# Patient Record
Sex: FEMALE | Race: WHITE | NOT HISPANIC OR LATINO | Employment: FULL TIME | URBAN - METROPOLITAN AREA
[De-identification: names, ages, dates, MRNs, and addresses within clinical notes are randomized per-mention and may not be internally consistent; named-entity substitution may affect disease eponyms.]

---

## 2023-01-20 PROBLEM — E66.9 ADULT-ONSET OBESITY: Status: ACTIVE | Noted: 2023-01-20

## 2023-01-20 PROBLEM — K04.7 DENTAL ABSCESS: Status: ACTIVE | Noted: 2023-01-20

## 2023-01-20 PROBLEM — E66.09 CLASS 1 OBESITY DUE TO EXCESS CALORIES WITH BODY MASS INDEX (BMI) OF 33.0 TO 33.9 IN ADULT: Status: ACTIVE | Noted: 2023-01-20

## 2023-01-20 PROBLEM — R05.9 COUGH: Status: ACTIVE | Noted: 2023-01-20

## 2023-01-20 PROBLEM — M75.82 TENDINITIS OF LEFT ROTATOR CUFF: Status: ACTIVE | Noted: 2023-01-20

## 2023-01-20 PROBLEM — E66.812 CLASS 2 OBESITY DUE TO EXCESS CALORIES WITH BODY MASS INDEX (BMI) OF 35.0 TO 35.9 IN ADULT: Status: ACTIVE | Noted: 2023-01-20

## 2023-01-20 PROBLEM — R80.9 DIABETES MELLITUS WITH PROTEINURIA (MULTI): Status: ACTIVE | Noted: 2023-01-20

## 2023-01-20 PROBLEM — J01.90 ACUTE SINUSITIS: Status: ACTIVE | Noted: 2023-01-20

## 2023-01-20 PROBLEM — R93.89 ABNORMAL CT OF THE CHEST: Status: ACTIVE | Noted: 2023-01-20

## 2023-01-20 PROBLEM — I63.9 CVA (CEREBRAL VASCULAR ACCIDENT) (MULTI): Status: ACTIVE | Noted: 2023-01-20

## 2023-01-20 PROBLEM — E78.5 HYPERLIPIDEMIA: Status: ACTIVE | Noted: 2023-01-20

## 2023-01-20 PROBLEM — M21.371 FOOT DROP, RIGHT: Status: ACTIVE | Noted: 2023-01-20

## 2023-01-20 PROBLEM — G62.9 PERIPHERAL NEUROPATHY: Status: ACTIVE | Noted: 2023-01-20

## 2023-01-20 PROBLEM — T40.2X5A CONSTIPATION DUE TO OPIOID THERAPY: Status: ACTIVE | Noted: 2023-01-20

## 2023-01-20 PROBLEM — E66.01 CLASS 2 SEVERE OBESITY WITH SERIOUS COMORBIDITY AND BODY MASS INDEX (BMI) OF 35.0 TO 35.9 IN ADULT (MULTI): Status: ACTIVE | Noted: 2023-01-20

## 2023-01-20 PROBLEM — L97.929 LEG ULCER, LEFT (MULTI): Status: ACTIVE | Noted: 2023-01-20

## 2023-01-20 PROBLEM — I50.9 CHF (CONGESTIVE HEART FAILURE) (MULTI): Status: ACTIVE | Noted: 2023-01-20

## 2023-01-20 PROBLEM — R00.2 HEART PALPITATIONS: Status: ACTIVE | Noted: 2023-01-20

## 2023-01-20 PROBLEM — R21 RASH, SKIN: Status: ACTIVE | Noted: 2023-01-20

## 2023-01-20 PROBLEM — E66.812 CLASS 2 SEVERE OBESITY WITH SERIOUS COMORBIDITY AND BODY MASS INDEX (BMI) OF 35.0 TO 35.9 IN ADULT: Status: ACTIVE | Noted: 2023-01-20

## 2023-01-20 PROBLEM — I70.229 CRITICAL ISCHEMIA OF LOWER EXTREMITY (MULTI): Status: ACTIVE | Noted: 2023-01-20

## 2023-01-20 PROBLEM — I25.10 CVD (CARDIOVASCULAR DISEASE): Status: ACTIVE | Noted: 2023-01-20

## 2023-01-20 PROBLEM — E11.29 DIABETES MELLITUS WITH PROTEINURIA (MULTI): Status: ACTIVE | Noted: 2023-01-20

## 2023-01-20 PROBLEM — S39.012A LUMBOSACRAL STRAIN: Status: ACTIVE | Noted: 2023-01-20

## 2023-01-20 PROBLEM — E11.65 TYPE 2 DIABETES MELLITUS WITH HYPERGLYCEMIA (MULTI): Status: ACTIVE | Noted: 2023-01-20

## 2023-01-20 PROBLEM — R52 GENERALIZED BODY ACHES: Status: ACTIVE | Noted: 2023-01-20

## 2023-01-20 PROBLEM — R23.3 PETECHIAL RASH: Status: ACTIVE | Noted: 2023-01-20

## 2023-01-20 PROBLEM — D17.21 LIPOMA OF RIGHT UPPER EXTREMITY: Status: ACTIVE | Noted: 2023-01-20

## 2023-01-20 PROBLEM — L97.509 DIABETIC FOOT ULCER (MULTI): Status: ACTIVE | Noted: 2023-01-20

## 2023-01-20 PROBLEM — U07.1 COVID-19 VIRUS INFECTION: Status: ACTIVE | Noted: 2023-01-20

## 2023-01-20 PROBLEM — J30.9 ALLERGIC RHINITIS: Status: ACTIVE | Noted: 2023-01-20

## 2023-01-20 PROBLEM — E11.621 DIABETIC FOOT ULCER (MULTI): Status: ACTIVE | Noted: 2023-01-20

## 2023-01-20 PROBLEM — R06.00 DYSPNEA: Status: ACTIVE | Noted: 2023-01-20

## 2023-01-20 PROBLEM — R19.7 DIARRHEA: Status: ACTIVE | Noted: 2023-01-20

## 2023-01-20 PROBLEM — J02.9 ACUTE PHARYNGITIS: Status: ACTIVE | Noted: 2023-01-20

## 2023-01-20 PROBLEM — R93.1 ABNORMAL HEART SCORE CT: Status: ACTIVE | Noted: 2023-01-20

## 2023-01-20 PROBLEM — B02.9 SHINGLES: Status: ACTIVE | Noted: 2023-01-20

## 2023-01-20 PROBLEM — R93.1 ABNORMAL ECHOCARDIOGRAM: Status: ACTIVE | Noted: 2023-01-20

## 2023-01-20 PROBLEM — I48.0 PAROXYSMAL ATRIAL FIBRILLATION (MULTI): Status: ACTIVE | Noted: 2023-01-20

## 2023-01-20 PROBLEM — M25.512 ACUTE PAIN OF LEFT SHOULDER: Status: ACTIVE | Noted: 2023-01-20

## 2023-01-20 PROBLEM — K59.03 CONSTIPATION DUE TO OPIOID THERAPY: Status: ACTIVE | Noted: 2023-01-20

## 2023-01-20 PROBLEM — E66.811 CLASS 1 OBESITY DUE TO EXCESS CALORIES WITH BODY MASS INDEX (BMI) OF 33.0 TO 33.9 IN ADULT: Status: ACTIVE | Noted: 2023-01-20

## 2023-01-20 PROBLEM — H81.10 VERTIGO, BENIGN POSITIONAL: Status: ACTIVE | Noted: 2023-01-20

## 2023-01-20 PROBLEM — I63.81: Status: ACTIVE | Noted: 2023-01-20

## 2023-01-20 PROBLEM — M79.2 NEURALGIA: Status: ACTIVE | Noted: 2023-01-20

## 2023-01-20 PROBLEM — I51.9 LV DYSFUNCTION: Status: ACTIVE | Noted: 2023-01-20

## 2023-01-20 PROBLEM — G89.29 CHRONIC PAIN: Status: ACTIVE | Noted: 2023-01-20

## 2023-01-20 PROBLEM — R30.0 DYSURIA: Status: ACTIVE | Noted: 2023-01-20

## 2023-01-20 PROBLEM — I48.91 ATRIAL FIBRILLATION (MULTI): Status: ACTIVE | Noted: 2023-01-20

## 2023-01-20 PROBLEM — M25.512 LEFT SHOULDER PAIN: Status: ACTIVE | Noted: 2023-01-20

## 2023-01-20 PROBLEM — B02.29 HZV (HERPES ZOSTER VIRUS) POST HERPETIC NEURALGIA: Status: ACTIVE | Noted: 2023-01-20

## 2023-01-20 PROBLEM — Z20.822 SUSPECTED COVID-19 VIRUS INFECTION: Status: ACTIVE | Noted: 2023-01-20

## 2023-01-20 PROBLEM — E78.2 HYPERLIPEMIA, MIXED: Status: ACTIVE | Noted: 2023-01-20

## 2023-01-20 PROBLEM — H81.10 BENIGN POSITIONAL VERTIGO: Status: ACTIVE | Noted: 2023-01-20

## 2023-01-20 PROBLEM — Z98.890 HISTORY OF SURGICAL PROCEDURE: Status: ACTIVE | Noted: 2023-01-20

## 2023-01-20 PROBLEM — Z98.61 S/P PTCA (PERCUTANEOUS TRANSLUMINAL CORONARY ANGIOPLASTY): Status: ACTIVE | Noted: 2023-01-20

## 2023-01-20 PROBLEM — E66.09 CLASS 2 OBESITY DUE TO EXCESS CALORIES WITH BODY MASS INDEX (BMI) OF 35.0 TO 35.9 IN ADULT: Status: ACTIVE | Noted: 2023-01-20

## 2023-01-20 PROBLEM — I25.10 CAD IN NATIVE ARTERY: Status: ACTIVE | Noted: 2023-01-20

## 2023-01-20 PROBLEM — M62.81 MUSCLE WEAKNESS OF LOWER EXTREMITY: Status: ACTIVE | Noted: 2023-01-20

## 2023-01-20 RX ORDER — TIRZEPATIDE 7.5 MG/.5ML
7.5 INJECTION, SOLUTION SUBCUTANEOUS
COMMUNITY
End: 2023-03-07 | Stop reason: SDUPTHER

## 2023-01-20 RX ORDER — GABAPENTIN 300 MG/1
1 CAPSULE ORAL EVERY 4 HOURS
COMMUNITY
Start: 2016-04-14 | End: 2023-08-08 | Stop reason: SDUPTHER

## 2023-01-20 RX ORDER — CARVEDILOL 3.12 MG/1
1 TABLET ORAL 2 TIMES DAILY
COMMUNITY
Start: 2022-06-07 | End: 2023-05-10 | Stop reason: WASHOUT

## 2023-01-20 RX ORDER — HYDROCODONE BITARTRATE AND ACETAMINOPHEN 5; 325 MG/1; MG/1
1 TABLET ORAL EVERY 8 HOURS PRN
COMMUNITY
Start: 2022-10-18 | End: 2023-03-07 | Stop reason: SDUPTHER

## 2023-01-20 RX ORDER — HYDROCODONE BITARTRATE AND ACETAMINOPHEN 5; 325 MG/1; MG/1
1 TABLET ORAL EVERY 8 HOURS
COMMUNITY
End: 2023-05-10 | Stop reason: SDUPTHER

## 2023-01-20 RX ORDER — CLOPIDOGREL BISULFATE 75 MG/1
1 TABLET ORAL DAILY
COMMUNITY
Start: 2018-08-03 | End: 2023-03-07 | Stop reason: WASHOUT

## 2023-01-20 RX ORDER — FLASH GLUCOSE SENSOR
1 KIT MISCELLANEOUS
COMMUNITY
End: 2024-02-02 | Stop reason: SDUPTHER

## 2023-01-20 RX ORDER — TIRZEPATIDE 10 MG/.5ML
10 INJECTION, SOLUTION SUBCUTANEOUS
COMMUNITY
End: 2023-08-08 | Stop reason: SDUPTHER

## 2023-01-20 RX ORDER — FLASH GLUCOSE SCANNING READER
EACH MISCELLANEOUS 4 TIMES DAILY
COMMUNITY

## 2023-01-20 RX ORDER — SPIRONOLACTONE 25 MG/1
1 TABLET ORAL 2 TIMES DAILY
COMMUNITY
Start: 2022-05-24 | End: 2023-08-08 | Stop reason: ALTCHOICE

## 2023-01-20 RX ORDER — LANCETS 33 GAUGE
EACH MISCELLANEOUS DAILY
COMMUNITY
End: 2023-03-07 | Stop reason: WASHOUT

## 2023-01-20 RX ORDER — PSEUDOEPHEDRINE HCL 120 MG/1
1 TABLET, FILM COATED, EXTENDED RELEASE ORAL EVERY 12 HOURS PRN
COMMUNITY
Start: 2020-09-14 | End: 2023-04-20 | Stop reason: SDUPTHER

## 2023-01-20 RX ORDER — FUROSEMIDE 40 MG/1
1 TABLET ORAL DAILY
COMMUNITY
Start: 2022-06-07 | End: 2023-08-08 | Stop reason: ALTCHOICE

## 2023-01-20 RX ORDER — INSULIN LISPRO 100 [IU]/ML
24 INJECTION, SOLUTION INTRAVENOUS; SUBCUTANEOUS
COMMUNITY
Start: 2013-12-05 | End: 2023-05-10 | Stop reason: ALTCHOICE

## 2023-02-24 RX ORDER — NALOXONE HYDROCHLORIDE 4 MG/.1ML
SPRAY NASAL DAILY PRN
COMMUNITY

## 2023-02-24 RX ORDER — INSULIN LISPRO 100 [IU]/ML
INJECTION, SOLUTION INTRAVENOUS; SUBCUTANEOUS
COMMUNITY
End: 2023-03-07 | Stop reason: SDUPTHER

## 2023-02-24 RX ORDER — IBUPROFEN 200 MG
CAPSULE ORAL
COMMUNITY
Start: 2019-03-13 | End: 2023-03-07 | Stop reason: WASHOUT

## 2023-02-24 RX ORDER — INSULIN DEGLUDEC 100 U/ML
INJECTION, SOLUTION SUBCUTANEOUS
COMMUNITY
Start: 2022-09-13 | End: 2023-03-07 | Stop reason: WASHOUT

## 2023-03-07 ENCOUNTER — OFFICE VISIT (OUTPATIENT)
Dept: PRIMARY CARE | Facility: CLINIC | Age: 73
End: 2023-03-07
Payer: MEDICARE

## 2023-03-07 VITALS
BODY MASS INDEX: 29.1 KG/M2 | RESPIRATION RATE: 16 BRPM | SYSTOLIC BLOOD PRESSURE: 110 MMHG | HEART RATE: 59 BPM | WEIGHT: 192 LBS | TEMPERATURE: 97.4 F | HEIGHT: 68 IN | OXYGEN SATURATION: 97 % | DIASTOLIC BLOOD PRESSURE: 70 MMHG

## 2023-03-07 DIAGNOSIS — I50.42 CHRONIC COMBINED SYSTOLIC AND DIASTOLIC CONGESTIVE HEART FAILURE (MULTI): ICD-10-CM

## 2023-03-07 DIAGNOSIS — E11.65 TYPE 2 DIABETES MELLITUS WITH HYPERGLYCEMIA, WITH LONG-TERM CURRENT USE OF INSULIN (MULTI): Primary | ICD-10-CM

## 2023-03-07 DIAGNOSIS — I48.19 OTHER PERSISTENT ATRIAL FIBRILLATION (MULTI): ICD-10-CM

## 2023-03-07 DIAGNOSIS — E78.2 MIXED HYPERLIPIDEMIA: ICD-10-CM

## 2023-03-07 DIAGNOSIS — Z79.4 TYPE 2 DIABETES MELLITUS WITH HYPERGLYCEMIA, WITH LONG-TERM CURRENT USE OF INSULIN (MULTI): Primary | ICD-10-CM

## 2023-03-07 DIAGNOSIS — Z79.899 LONG-TERM USE OF HIGH-RISK MEDICATION: ICD-10-CM

## 2023-03-07 DIAGNOSIS — R30.0 DYSURIA: ICD-10-CM

## 2023-03-07 DIAGNOSIS — K58.1 IRRITABLE BOWEL SYNDROME WITH CONSTIPATION: ICD-10-CM

## 2023-03-07 DIAGNOSIS — G89.4 CHRONIC PAIN SYNDROME: ICD-10-CM

## 2023-03-07 PROBLEM — E11.621 DIABETIC FOOT ULCER (MULTI): Status: RESOLVED | Noted: 2023-01-20 | Resolved: 2023-03-07

## 2023-03-07 PROBLEM — E66.01 CLASS 2 SEVERE OBESITY WITH SERIOUS COMORBIDITY AND BODY MASS INDEX (BMI) OF 35.0 TO 35.9 IN ADULT (MULTI): Status: RESOLVED | Noted: 2023-01-20 | Resolved: 2023-03-07

## 2023-03-07 PROBLEM — E66.812 CLASS 2 SEVERE OBESITY WITH SERIOUS COMORBIDITY AND BODY MASS INDEX (BMI) OF 35.0 TO 35.9 IN ADULT: Status: RESOLVED | Noted: 2023-01-20 | Resolved: 2023-03-07

## 2023-03-07 PROBLEM — L97.509 DIABETIC FOOT ULCER (MULTI): Status: RESOLVED | Noted: 2023-01-20 | Resolved: 2023-03-07

## 2023-03-07 PROBLEM — E66.3 OVERWEIGHT WITH BODY MASS INDEX (BMI) OF 29 TO 29.9 IN ADULT: Status: ACTIVE | Noted: 2023-03-07

## 2023-03-07 PROBLEM — L97.929 LEG ULCER, LEFT (MULTI): Status: RESOLVED | Noted: 2023-01-20 | Resolved: 2023-03-07

## 2023-03-07 LAB
POC APPEARANCE, URINE: CLEAR
POC BILIRUBIN, URINE: ABNORMAL
POC BLOOD, URINE: NEGATIVE
POC COLOR, URINE: YELLOW
POC GLUCOSE, URINE: NEGATIVE MG/DL
POC HEMOGLOBIN A1C: 5.8 % (ref 4.2–6.5)
POC KETONES, URINE: ABNORMAL MG/DL
POC LEUKOCYTES, URINE: NEGATIVE
POC NITRITE,URINE: NEGATIVE
POC PH, URINE: 5.5 PH
POC PROTEIN, URINE: NEGATIVE MG/DL
POC SPECIFIC GRAVITY, URINE: 1.02
POC UROBILINOGEN, URINE: 0.2 EU/DL

## 2023-03-07 PROCEDURE — 80358 DRUG SCREENING METHADONE: CPT

## 2023-03-07 PROCEDURE — 1159F MED LIST DOCD IN RCRD: CPT | Performed by: FAMILY MEDICINE

## 2023-03-07 PROCEDURE — 80373 DRUG SCREENING TRAMADOL: CPT

## 2023-03-07 PROCEDURE — 1036F TOBACCO NON-USER: CPT | Performed by: FAMILY MEDICINE

## 2023-03-07 PROCEDURE — 81003 URINALYSIS AUTO W/O SCOPE: CPT | Performed by: FAMILY MEDICINE

## 2023-03-07 PROCEDURE — 3074F SYST BP LT 130 MM HG: CPT | Performed by: FAMILY MEDICINE

## 2023-03-07 PROCEDURE — 80365 DRUG SCREENING OXYCODONE: CPT

## 2023-03-07 PROCEDURE — 1160F RVW MEDS BY RX/DR IN RCRD: CPT | Performed by: FAMILY MEDICINE

## 2023-03-07 PROCEDURE — G0439 PPPS, SUBSEQ VISIT: HCPCS | Performed by: FAMILY MEDICINE

## 2023-03-07 PROCEDURE — 1170F FXNL STATUS ASSESSED: CPT | Performed by: FAMILY MEDICINE

## 2023-03-07 PROCEDURE — 3008F BODY MASS INDEX DOCD: CPT | Performed by: FAMILY MEDICINE

## 2023-03-07 PROCEDURE — 80368 SEDATIVE HYPNOTICS: CPT

## 2023-03-07 PROCEDURE — 80354 DRUG SCREENING FENTANYL: CPT

## 2023-03-07 PROCEDURE — 80307 DRUG TEST PRSMV CHEM ANLYZR: CPT

## 2023-03-07 PROCEDURE — 3078F DIAST BP <80 MM HG: CPT | Performed by: FAMILY MEDICINE

## 2023-03-07 PROCEDURE — 99214 OFFICE O/P EST MOD 30 MIN: CPT | Performed by: FAMILY MEDICINE

## 2023-03-07 PROCEDURE — 80346 BENZODIAZEPINES1-12: CPT

## 2023-03-07 PROCEDURE — 83036 HEMOGLOBIN GLYCOSYLATED A1C: CPT | Performed by: FAMILY MEDICINE

## 2023-03-07 PROCEDURE — 80361 OPIATES 1 OR MORE: CPT

## 2023-03-07 RX ORDER — HYDROCODONE BITARTRATE AND ACETAMINOPHEN 5; 325 MG/1; MG/1
1 TABLET ORAL EVERY 6 HOURS PRN
Qty: 90 TABLET | Refills: 0 | Status: SHIPPED | OUTPATIENT
Start: 2023-05-06 | End: 2023-05-10 | Stop reason: SDUPTHER

## 2023-03-07 RX ORDER — HYDROCODONE BITARTRATE AND ACETAMINOPHEN 5; 325 MG/1; MG/1
1 TABLET ORAL EVERY 6 HOURS PRN
Qty: 90 TABLET | Refills: 0 | Status: SHIPPED | OUTPATIENT
Start: 2023-04-06 | End: 2023-05-10 | Stop reason: SDUPTHER

## 2023-03-07 RX ORDER — HYDROCODONE BITARTRATE AND ACETAMINOPHEN 5; 325 MG/1; MG/1
1 TABLET ORAL EVERY 8 HOURS PRN
Qty: 90 TABLET | Refills: 0 | Status: SHIPPED | OUTPATIENT
Start: 2023-03-07 | End: 2023-04-06

## 2023-03-07 RX ORDER — PEN NEEDLE, DIABETIC 31 GX5/16"
NEEDLE, DISPOSABLE MISCELLANEOUS
COMMUNITY
Start: 2022-09-30 | End: 2023-11-07 | Stop reason: WASHOUT

## 2023-03-07 ASSESSMENT — ACTIVITIES OF DAILY LIVING (ADL)
MANAGING_FINANCES: INDEPENDENT
BATHING: INDEPENDENT
DOING_HOUSEWORK: INDEPENDENT
BATHING: INDEPENDENT
DRESSING: INDEPENDENT
TAKING_MEDICATION: INDEPENDENT
DRESSING: INDEPENDENT
GROCERY_SHOPPING: INDEPENDENT

## 2023-03-07 ASSESSMENT — PATIENT HEALTH QUESTIONNAIRE - PHQ9
SUM OF ALL RESPONSES TO PHQ9 QUESTIONS 1 AND 2: 0
SUM OF ALL RESPONSES TO PHQ9 QUESTIONS 1 AND 2: 0
2. FEELING DOWN, DEPRESSED OR HOPELESS: NOT AT ALL
1. LITTLE INTEREST OR PLEASURE IN DOING THINGS: NOT AT ALL
2. FEELING DOWN, DEPRESSED OR HOPELESS: NOT AT ALL
1. LITTLE INTEREST OR PLEASURE IN DOING THINGS: NOT AT ALL
1. LITTLE INTEREST OR PLEASURE IN DOING THINGS: NOT AT ALL
2. FEELING DOWN, DEPRESSED OR HOPELESS: NOT AT ALL
SUM OF ALL RESPONSES TO PHQ9 QUESTIONS 1 AND 2: 0

## 2023-03-07 ASSESSMENT — ENCOUNTER SYMPTOMS
LOSS OF SENSATION IN FEET: 0
ROS SKIN COMMENTS: RIGHT INDEX FINGER
BACK PAIN: 1
ARTHRALGIAS: 1
DEPRESSION: 0
OCCASIONAL FEELINGS OF UNSTEADINESS: 0
UNEXPECTED WEIGHT CHANGE: 1
WOUND: 1

## 2023-03-07 NOTE — PATIENT INSTRUCTIONS
Nice to see you! Refilled linzess and pain medications. Get labwork before you return. Be sure to  your pain medications when they are do, and GREAT job on weight loss!         Ways to Help Prevent Falls at Home    Quick Tips   ? Ask for help if you need it. Most people want to help!   ? Get up slowly after sitting or laying down   ? Wear a medical alert device or keep cell phone in your pocket   ? Use night lights, especially areas near a bathroom   ? Keep the items you use often within reach on a small stool or end table   ? Use an assistive device such as walker or cane, as directed by provider/physical therapy   ? Use a non-slip mat and grab bars in your bathroom. Look for home health sections for best options     Other Areas to Focus On   ? Exercise and nutrition: Regular exercise or taking a falls prevention class are great ways improve strength and balance. Don’t forget to stay hydrated and bring a snack!   ? Medicine side effects: Some medicines can make you sleepy or dizzy, which could cause a fall. Ask your healthcare provider about the side effects your medicines could cause. Be sure to let them know if you take any vitamins or supplements as well.   ? Tripping hazards: Remove items you could trip on, such as loose mats, rugs, cords, and clutter. Wear closed toe shoes with rubber soles.   ? Health and wellness: Get regular checkups with your healthcare provider, plus routine vision and hearing screenings. Talk with your healthcare provider about:   o Your medicines and the possible side effects - bring them in a bag if that is easier!   o Problems with balance or feeling dizzy   o Ways to promote bone health, such as Vitamin D and calcium supplements   o Questions or concerns about falling     *Ask your healthcare team if you have questions     [facility], 2022

## 2023-03-07 NOTE — PROGRESS NOTES
OARRS:  Anca Jerry MD on 3/7/2023 11:05 AM  I have personally reviewed the OARRS report for Kerry Spence. I have considered the risks of abuse, dependence, addiction and diversion    Is the patient prescribed a combination of a benzodiazepine and opioid?  Yes, I feel it is clincially indicated to continue the medication and have discussed with the patient risks/benefits/alternatives.    Last Urine Drug Screen / ordered today: Yes  Recent Results (from the past 04532 hour(s))   OPIATE/OPIOID/BENZO PRESCRIPTION COMPLIANCE    Collection Time: 03/07/22  4:18 PM   Result Value Ref Range    DRUG SCREEN COMMENT URINE SEE BELOW     Creatine, Urine 229.7 mg/dL    Amphetamine Screen, Urine PRESUMPTIVE NEGATIVE NEGATIVE    Barbiturate Screen, Urine PRESUMPTIVE NEGATIVE NEGATIVE    Cannabinoid Screen, Urine PRESUMPTIVE NEGATIVE NEGATIVE    Cocaine Screen, Urine PRESUMPTIVE NEGATIVE NEGATIVE    PCP Screen, Urine PRESUMPTIVE NEGATIVE NEGATIVE    7-Aminoclonazepam <25 Cutoff <25 ng/mL    Alpha-Hydroxyalprazolam <25 Cutoff <25 ng/mL    Alpha-Hydroxymidazolam <25 Cutoff <25 ng/mL    Alprazolam <25 Cutoff <25 ng/mL    Chlordiazepoxide <25 Cutoff <25 ng/mL    Clonazepam <25 Cutoff <25 ng/mL    Diazepam <25 Cutoff <25 ng/mL    Lorazepam <25 Cutoff <25 ng/mL    Midazolam <25 Cutoff <25 ng/mL    Nordiazepam <25 Cutoff <25 ng/mL    Oxazepam <25 Cutoff <25 ng/mL    Temazepam <25 Cutoff <25 ng/mL    Zolpidem <25 Cutoff <25 ng/mL    Zolpidem Metabolite (ZCA) <25 Cutoff <25 ng/mL    6-Acetylmorphine <25 Cutoff <25 ng/mL    Codeine <50 Cutoff <50 ng/mL    Hydrocodone <25 Cutoff <25 ng/mL    Hydromorphone <25 Cutoff <25 ng/mL    Morphine Urine <50 Cutoff <50 ng/mL    Norhydrocodone <25 Cutoff <25 ng/mL    Noroxycodone <25 Cutoff <25 ng/mL    Oxycodone <25 Cutoff <25 ng/mL    Oxymorphone <25 Cutoff <25 ng/mL    Tramadol <50 Cutoff <50 ng/mL    O-Desmethyltramadol <50 Cutoff <50 ng/mL    Fentanyl <2.5 Cutoff<2.5 ng/mL    Norfentanyl  <2.5 Cutoff<2.5 ng/mL    METHADONE CONFIRMATION,URINE <25 Cutoff <25 ng/mL    EDDP <25 Cutoff <25 ng/mL     Results are as expected.     Controlled Substance Agreement:  Date of the Last Agreement: 03/07/2023  Reviewed Controlled Substance Agreement including but not limited to the benefits, risks, and alternatives to treatment with a Controlled Substance medication(s).    Opioids:  What is the patient's goal of therapy? Improved pain  Is this being achieved with current treatment? yes    I have calculated the patient's Morphine Dose Equivalent (MED):   I have considered referral to Pain Management and/or a specialist, and do not feel it is necessary at this time.    I feel that it is clinically indicated to continue this current medication regimen after consideration of alternative therapies, and other non-opioid treatment.    Opioid Risk Screening:  No data recorded    Pain Assessment:  No data recordedOARRS:  Subjective   Reason for Visit: Kerry Spence is an 72 y.o. female here for a Medicare Wellness visit.   In addition, her A1C is 5.8 and weight is down, 40 pounds. Sees Dr. Verma and feet ae doing well.  Past Medical, Surgical, and Family History reviewed and updated in chart.    Reviewed all medications by prescribing practitioner or clinical pharmacist (such as prescriptions, OTCs, herbal therapies and supplements) and documented in the medical record.    Patient Self Assessment of Health Status  Patient Self Assessment: Good    Nutrition and Exercise  Current Diet: Well Balanced Diet  Adequate Fluid Intake: Yes  Caffeine: No  Exercise Frequency: Regularly    Functional Ability/Level of Safety  Cognitive Impairment Observed: No cognitive impairment observed  Cognitive Impairment Reported: No cognitive impairment reported by patient or family    Home Safety Risk Factors: None    Patient Care Team:  Anca Jerry MD as PCP - General  Anca Jerry MD as PCP - [employer] PCP  "    Review of Systems   Constitutional:  Positive for unexpected weight change.        Great weight loss with Mounjaro   Musculoskeletal:  Positive for arthralgias and back pain.   Skin:  Positive for wound.        Right index finger   All other systems reviewed and are negative.      Objective   Vitals:  /70   Pulse 59   Temp 36.3 °C (97.4 °F)   Resp 16   Ht 1.727 m (5' 8\")   Wt 87.1 kg (192 lb)   SpO2 97%   BMI 29.19 kg/m²       Physical Exam  Vitals reviewed.   Constitutional:       Appearance: Normal appearance.   HENT:      Head: Normocephalic and atraumatic.      Right Ear: Tympanic membrane normal.      Left Ear: Tympanic membrane normal.      Nose: Nose normal.      Mouth/Throat:      Mouth: Mucous membranes are moist.      Pharynx: Oropharynx is clear.   Eyes:      Extraocular Movements: Extraocular movements intact.      Conjunctiva/sclera: Conjunctivae normal.      Pupils: Pupils are equal, round, and reactive to light.   Cardiovascular:      Rate and Rhythm: Normal rate and regular rhythm.      Pulses: Normal pulses.      Heart sounds: Normal heart sounds.   Pulmonary:      Effort: Pulmonary effort is normal.      Breath sounds: Normal breath sounds.   Abdominal:      General: Abdomen is flat. Bowel sounds are normal.      Palpations: Abdomen is soft.   Musculoskeletal:         General: Normal range of motion.      Cervical back: Normal range of motion and neck supple.   Skin:     General: Skin is warm and dry.      Capillary Refill: Capillary refill takes less than 2 seconds.      Findings: Lesion present.      Comments: Right index finger with ulcerated healing lesion on knuckle   Neurological:      General: No focal deficit present.      Mental Status: She is alert and oriented to person, place, and time.   Psychiatric:         Mood and Affect: Mood normal.         Behavior: Behavior normal.         Assessment/Plan   Problem List Items Addressed This Visit        Nervous    Dysuria    " Relevant Orders    POCT UA Automated manually resulted (Completed)    Urine Culture    Chronic pain    Relevant Medications    HYDROcodone-acetaminophen (Norco) 5-325 mg tablet    HYDROcodone-acetaminophen (Norco) 5-325 mg tablet (Start on 4/6/2023)    HYDROcodone-acetaminophen (Norco) 5-325 mg tablet (Start on 5/6/2023)    Other Relevant Orders    Follow Up In Primary Care       Endocrine/Metabolic    Type 2 diabetes mellitus with hyperglycemia (CMS/HCC) - Primary    Relevant Orders    POCT glycosylated hemoglobin (Hb A1C) manually resulted (Completed)    Follow Up In Primary Care    Albumin, urine, random    CBC and Auto Differential    Comprehensive Metabolic Panel    TSH with reflex to Free T4 if abnormal       Other    Hyperlipidemia    Relevant Orders    Comprehensive Metabolic Panel    Lipid Panel   Other Visit Diagnoses     Long-term use of high-risk medication        Relevant Medications    HYDROcodone-acetaminophen (Norco) 5-325 mg tablet    HYDROcodone-acetaminophen (Norco) 5-325 mg tablet (Start on 4/6/2023)    HYDROcodone-acetaminophen (Norco) 5-325 mg tablet (Start on 5/6/2023)    Other Relevant Orders    Opiate/Opioid/Benzo Extended Prescription Compliance    OOB Internal Tracking    Irritable bowel syndrome with constipation        Relevant Medications    linaCLOtide (Linzess) 145 mcg capsule    Other persistent atrial fibrillation (CMS/HCC)          Great weight loss with mounjaro        Patient was identified as a fall risk. Risk prevention instructions provided.

## 2023-03-10 LAB
6-ACETYLMORPHINE: <25 NG/ML
7-AMINOCLONAZEPAM: <25 NG/ML
ALPHA-HYDROXYALPRAZOLAM: <25 NG/ML
ALPHA-HYDROXYMIDAZOLAM: <25 NG/ML
ALPRAZOLAM: <25 NG/ML
AMPHETAMINE (PRESENCE) IN URINE BY SCREEN METHOD: NORMAL
BARBITURATES PRESENCE IN URINE BY SCREEN METHOD: NORMAL
CANNABINOIDS IN URINE BY SCREEN METHOD: NORMAL
CHLORDIAZEPOXIDE: <25 NG/ML
CLONAZEPAM: <25 NG/ML
COCAINE (PRESENCE) IN URINE BY SCREEN METHOD: NORMAL
CODEINE: <50 NG/ML
CREATINE, URINE FOR DRUG: 241.4 MG/DL
DIAZEPAM: <25 NG/ML
DRUG SCREEN COMMENT URINE: NORMAL
EDDP: <25 NG/ML
FENTANYL CONFIRMATION, URINE: <2.5 NG/ML
HYDROCODONE: <25 NG/ML
HYDROMORPHONE: <25 NG/ML
LORAZEPAM: <25 NG/ML
METHADONE CONFIRMATION,URINE: <25 NG/ML
MIDAZOLAM: <25 NG/ML
MORPHINE URINE: <50 NG/ML
NORDIAZEPAM: <25 NG/ML
NORFENTANYL: <2.5 NG/ML
NORHYDROCODONE: <25 NG/ML
NOROXYCODONE: <25 NG/ML
O-DESMETHYLTRAMADOL: <50 NG/ML
OXAZEPAM: <25 NG/ML
OXYCODONE: <25 NG/ML
OXYMORPHONE: <25 NG/ML
PHENCYCLIDINE (PRESENCE) IN URINE BY SCREEN METHOD: NORMAL
TEMAZEPAM: <25 NG/ML
TRAMADOL: <50 NG/ML
ZOLPIDEM METABOLITE (ZCA): <25 NG/ML
ZOLPIDEM: <25 NG/ML

## 2023-03-31 LAB
CLUE CELLS: NORMAL
NUGENT SCORE: 1
YEAST: NORMAL

## 2023-04-20 DIAGNOSIS — J30.9 ALLERGIC RHINITIS, UNSPECIFIED SEASONALITY, UNSPECIFIED TRIGGER: Primary | ICD-10-CM

## 2023-04-20 RX ORDER — PSEUDOEPHEDRINE HCL 120 MG/1
120 TABLET, FILM COATED, EXTENDED RELEASE ORAL EVERY 12 HOURS PRN
Qty: 60 TABLET | Refills: 5 | Status: SHIPPED | OUTPATIENT
Start: 2023-04-20 | End: 2023-04-24 | Stop reason: SDUPTHER

## 2023-04-24 ENCOUNTER — TELEPHONE (OUTPATIENT)
Dept: PRIMARY CARE | Facility: CLINIC | Age: 73
End: 2023-04-24
Payer: MEDICARE

## 2023-04-24 DIAGNOSIS — J01.81 OTHER ACUTE RECURRENT SINUSITIS: Primary | ICD-10-CM

## 2023-04-24 DIAGNOSIS — J30.9 ALLERGIC RHINITIS, UNSPECIFIED SEASONALITY, UNSPECIFIED TRIGGER: ICD-10-CM

## 2023-04-24 RX ORDER — PSEUDOEPHEDRINE HCL 120 MG/1
120 TABLET, FILM COATED, EXTENDED RELEASE ORAL EVERY 12 HOURS PRN
Qty: 60 TABLET | Refills: 5 | Status: SHIPPED | OUTPATIENT
Start: 2023-04-24 | End: 2023-08-08 | Stop reason: SDUPTHER

## 2023-04-24 NOTE — TELEPHONE ENCOUNTER
Patient said, she has been waiting weeks [id number]       Told her we would send today

## 2023-05-10 ENCOUNTER — OFFICE VISIT (OUTPATIENT)
Dept: PRIMARY CARE | Facility: CLINIC | Age: 73
End: 2023-05-10
Payer: MEDICARE

## 2023-05-10 VITALS
BODY MASS INDEX: 28.04 KG/M2 | OXYGEN SATURATION: 98 % | HEIGHT: 68 IN | SYSTOLIC BLOOD PRESSURE: 122 MMHG | HEART RATE: 70 BPM | WEIGHT: 185 LBS | TEMPERATURE: 97.5 F | RESPIRATION RATE: 17 BRPM | DIASTOLIC BLOOD PRESSURE: 74 MMHG

## 2023-05-10 DIAGNOSIS — E11.29 DIABETES MELLITUS WITH PROTEINURIA (MULTI): Primary | ICD-10-CM

## 2023-05-10 DIAGNOSIS — I25.10 CAD IN NATIVE ARTERY: ICD-10-CM

## 2023-05-10 DIAGNOSIS — E78.2 MIXED HYPERLIPIDEMIA: ICD-10-CM

## 2023-05-10 DIAGNOSIS — E11.65 TYPE 2 DIABETES MELLITUS WITH HYPERGLYCEMIA, WITH LONG-TERM CURRENT USE OF INSULIN (MULTI): ICD-10-CM

## 2023-05-10 DIAGNOSIS — J30.1 SEASONAL ALLERGIC RHINITIS DUE TO POLLEN: ICD-10-CM

## 2023-05-10 DIAGNOSIS — Z79.4 TYPE 2 DIABETES MELLITUS WITH HYPERGLYCEMIA, WITH LONG-TERM CURRENT USE OF INSULIN (MULTI): ICD-10-CM

## 2023-05-10 DIAGNOSIS — Z79.899 LONG-TERM USE OF HIGH-RISK MEDICATION: ICD-10-CM

## 2023-05-10 DIAGNOSIS — I63.81: ICD-10-CM

## 2023-05-10 DIAGNOSIS — G58.9 MONONEUROPATHY: ICD-10-CM

## 2023-05-10 DIAGNOSIS — I48.0 PAROXYSMAL ATRIAL FIBRILLATION (MULTI): ICD-10-CM

## 2023-05-10 DIAGNOSIS — I50.812 CHRONIC RIGHT-SIDED CONGESTIVE HEART FAILURE (MULTI): ICD-10-CM

## 2023-05-10 DIAGNOSIS — T40.2X5A CONSTIPATION DUE TO OPIOID THERAPY: ICD-10-CM

## 2023-05-10 DIAGNOSIS — M79.2 NEURALGIA: ICD-10-CM

## 2023-05-10 DIAGNOSIS — I63.9 CEREBROVASCULAR ACCIDENT (CVA), UNSPECIFIED MECHANISM (MULTI): ICD-10-CM

## 2023-05-10 DIAGNOSIS — K59.03 CONSTIPATION DUE TO OPIOID THERAPY: ICD-10-CM

## 2023-05-10 DIAGNOSIS — E66.3 OVERWEIGHT WITH BODY MASS INDEX (BMI) OF 28 TO 28.9 IN ADULT: ICD-10-CM

## 2023-05-10 DIAGNOSIS — G89.4 CHRONIC PAIN SYNDROME: ICD-10-CM

## 2023-05-10 DIAGNOSIS — R80.9 DIABETES MELLITUS WITH PROTEINURIA (MULTI): Primary | ICD-10-CM

## 2023-05-10 DIAGNOSIS — I25.10 CVD (CARDIOVASCULAR DISEASE): ICD-10-CM

## 2023-05-10 PROBLEM — E66.09 CLASS 1 OBESITY DUE TO EXCESS CALORIES WITH BODY MASS INDEX (BMI) OF 33.0 TO 33.9 IN ADULT: Status: RESOLVED | Noted: 2023-01-20 | Resolved: 2023-05-10

## 2023-05-10 PROBLEM — E66.812 CLASS 2 OBESITY DUE TO EXCESS CALORIES WITH BODY MASS INDEX (BMI) OF 35.0 TO 35.9 IN ADULT: Status: RESOLVED | Noted: 2023-01-20 | Resolved: 2023-05-10

## 2023-05-10 PROBLEM — E66.811 CLASS 1 OBESITY DUE TO EXCESS CALORIES WITH BODY MASS INDEX (BMI) OF 33.0 TO 33.9 IN ADULT: Status: RESOLVED | Noted: 2023-01-20 | Resolved: 2023-05-10

## 2023-05-10 PROBLEM — E66.09 CLASS 2 OBESITY DUE TO EXCESS CALORIES WITH BODY MASS INDEX (BMI) OF 35.0 TO 35.9 IN ADULT: Status: RESOLVED | Noted: 2023-01-20 | Resolved: 2023-05-10

## 2023-05-10 LAB — POC HEMOGLOBIN A1C: 5.9 % (ref 4.2–6.5)

## 2023-05-10 PROCEDURE — 1159F MED LIST DOCD IN RCRD: CPT | Performed by: FAMILY MEDICINE

## 2023-05-10 PROCEDURE — 3074F SYST BP LT 130 MM HG: CPT | Performed by: FAMILY MEDICINE

## 2023-05-10 PROCEDURE — 3078F DIAST BP <80 MM HG: CPT | Performed by: FAMILY MEDICINE

## 2023-05-10 PROCEDURE — 99214 OFFICE O/P EST MOD 30 MIN: CPT | Performed by: FAMILY MEDICINE

## 2023-05-10 PROCEDURE — 1036F TOBACCO NON-USER: CPT | Performed by: FAMILY MEDICINE

## 2023-05-10 PROCEDURE — 1170F FXNL STATUS ASSESSED: CPT | Performed by: FAMILY MEDICINE

## 2023-05-10 PROCEDURE — 83036 HEMOGLOBIN GLYCOSYLATED A1C: CPT | Performed by: FAMILY MEDICINE

## 2023-05-10 PROCEDURE — 3008F BODY MASS INDEX DOCD: CPT | Performed by: FAMILY MEDICINE

## 2023-05-10 PROCEDURE — 1160F RVW MEDS BY RX/DR IN RCRD: CPT | Performed by: FAMILY MEDICINE

## 2023-05-10 RX ORDER — PSEUDOEPHEDRINE HYDROCHLORIDE 120 MG/1
120 TABLET, FILM COATED, EXTENDED RELEASE ORAL EVERY 12 HOURS PRN
COMMUNITY
Start: 2022-07-24 | End: 2023-05-10 | Stop reason: SDUPTHER

## 2023-05-10 RX ORDER — HYDROCODONE BITARTRATE AND ACETAMINOPHEN 5; 325 MG/1; MG/1
1 TABLET ORAL EVERY 8 HOURS PRN
Qty: 90 TABLET | Refills: 0 | Status: SHIPPED | OUTPATIENT
Start: 2023-06-09 | End: 2023-07-09

## 2023-05-10 RX ORDER — HYDROCODONE BITARTRATE AND ACETAMINOPHEN 5; 325 MG/1; MG/1
1 TABLET ORAL EVERY 8 HOURS PRN
Qty: 90 TABLET | Refills: 0 | Status: SHIPPED | OUTPATIENT
Start: 2023-07-09 | End: 2023-08-08 | Stop reason: SDUPTHER

## 2023-05-10 RX ORDER — HYDROCODONE BITARTRATE AND ACETAMINOPHEN 5; 325 MG/1; MG/1
1 TABLET ORAL EVERY 8 HOURS PRN
Qty: 90 TABLET | Refills: 0 | Status: SHIPPED | OUTPATIENT
Start: 2023-05-10 | End: 2023-06-09

## 2023-05-10 ASSESSMENT — PATIENT HEALTH QUESTIONNAIRE - PHQ9
SUM OF ALL RESPONSES TO PHQ9 QUESTIONS 1 AND 2: 0
1. LITTLE INTEREST OR PLEASURE IN DOING THINGS: NOT AT ALL
2. FEELING DOWN, DEPRESSED OR HOPELESS: NOT AT ALL

## 2023-05-10 ASSESSMENT — ENCOUNTER SYMPTOMS
LOSS OF SENSATION IN FEET: 0
OCCASIONAL FEELINGS OF UNSTEADINESS: 0
DEPRESSION: 0
UNEXPECTED WEIGHT CHANGE: 1

## 2023-05-10 ASSESSMENT — ACTIVITIES OF DAILY LIVING (ADL)
BATHING: INDEPENDENT
MANAGING_FINANCES: INDEPENDENT
TAKING_MEDICATION: INDEPENDENT
DRESSING: INDEPENDENT
DOING_HOUSEWORK: INDEPENDENT
GROCERY_SHOPPING: INDEPENDENT

## 2023-05-10 NOTE — PATIENT INSTRUCTIONS
Assessment/Plan   Diagnoses and all orders for this visit:  Diabetes mellitus with proteinuria (CMS/HCC)  Type 2 diabetes mellitus with hyperglycemia, with long-term current use of insulin (CMS/HCC)  -     Follow Up In Primary Care  -     POCT glycosylated hemoglobin (Hb A1C) manually resulted  -     TSH with reflex to Free T4 if abnormal; Future  -     CBC and Auto Differential; Future  -     Comprehensive Metabolic Panel; Future  -     Lipid Panel; Future  -     TSH with reflex to Free T4 if abnormal; Future  Chronic pain syndrome  -     Follow Up In Primary Care  -     HYDROcodone-acetaminophen (Norco) 5-325 mg tablet; Take 1 tablet by mouth every 8 hours if needed for severe pain (7 - 10).  -     HYDROcodone-acetaminophen (Norco) 5-325 mg tablet; Take 1 tablet by mouth every 8 hours if needed for severe pain (7 - 10). Do not start before June 9, 2023.  -     HYDROcodone-acetaminophen (Norco) 5-325 mg tablet; Take 1 tablet by mouth every 8 hours if needed for severe pain (7 - 10). Do not start before July 9, 2023.  Overweight with body mass index (BMI) of 28 to 28.9 in adult  Paroxysmal atrial fibrillation (CMS/HCC)  -     TSH with reflex to Free T4 if abnormal; Future  -     CBC and Auto Differential; Future  -     Comprehensive Metabolic Panel; Future  -     Lipid Panel; Future  -     TSH with reflex to Free T4 if abnormal; Future  Chronic right-sided congestive heart failure (CMS/HCC)  -     TSH with reflex to Free T4 if abnormal; Future  -     CBC and Auto Differential; Future  -     Comprehensive Metabolic Panel; Future  -     Lipid Panel; Future  -     TSH with reflex to Free T4 if abnormal; Future  CVD (cardiovascular disease)  Mononeuropathy  Cerebrovascular accident (CVA), unspecified mechanism (CMS/HCC)  Lacunar stroke of right subthalamic region (CMS/HCC)  Neuralgia  CAD in native artery  Constipation due to opioid therapy  Seasonal allergic rhinitis due to pollen  Mixed hyperlipidemia  -     TSH  with reflex to Free T4 if abnormal; Future  -     CBC and Auto Differential; Future  -     Comprehensive Metabolic Panel; Future  -     Lipid Panel; Future  -     TSH with reflex to Free T4 if abnormal; Future  Long-term use of high-risk medication  -     HYDROcodone-acetaminophen (Norco) 5-325 mg tablet; Take 1 tablet by mouth every 8 hours if needed for severe pain (7 - 10). Do not start before June 9, 2023.  -     HYDROcodone-acetaminophen (Norco) 5-325 mg tablet; Take 1 tablet by mouth every 8 hours if needed for severe pain (7 - 10). Do not start before July 9, 2023.  Other orders  -     Follow Up In Primary Care    Will check A1C today.   Follow up three months.

## 2023-05-10 NOTE — PROGRESS NOTES
Subjective   Patient ID: Kerry Spence is a 72 y.o. female.    HPI 3month follow up, sugars are dramatically improved. 50 pound weight loss, appetite is suppressed, she is having poor taste, and does not need insulin and no a fib,.   Flor has been using her CGM to monitor her sugars with great success.   Review of Systems   Constitutional:  Positive for unexpected weight change (weight loss of 50 #).   All other systems reviewed and are negative.  OARRS:  Anca Jerry MD on 5/10/2023  1:37 PM  I have personally reviewed the OARRS report for Kerry Spence. I have considered the risks of abuse, dependence, addiction and diversion and I believe that it is clinically appropriate for Kerry Spence to be prescribed this medication    Is the patient prescribed a combination of a benzodiazepine and opioid?   Last Urine Drug Screen / ordered today: Yes  Recent Results (from the past 55131 hour(s))   OPIATE/OPIOID/BENZO PRESCRIPTION COMPLIANCE    Collection Time: 03/07/23 11:30 AM   Result Value Ref Range    DRUG SCREEN COMMENT URINE SEE BELOW     Creatine, Urine 241.4 mg/dL    Amphetamine Screen, Urine PRESUMPTIVE NEGATIVE NEGATIVE    Barbiturate Screen, Urine PRESUMPTIVE NEGATIVE NEGATIVE    Cannabinoid Screen, Urine PRESUMPTIVE NEGATIVE NEGATIVE    Cocaine Screen, Urine PRESUMPTIVE NEGATIVE NEGATIVE    PCP Screen, Urine PRESUMPTIVE NEGATIVE NEGATIVE    7-Aminoclonazepam <25 Cutoff <25 ng/mL    Alpha-Hydroxyalprazolam <25 Cutoff <25 ng/mL    Alpha-Hydroxymidazolam <25 Cutoff <25 ng/mL    Alprazolam <25 Cutoff <25 ng/mL    Chlordiazepoxide <25 Cutoff <25 ng/mL    Clonazepam <25 Cutoff <25 ng/mL    Diazepam <25 Cutoff <25 ng/mL    Lorazepam <25 Cutoff <25 ng/mL    Midazolam <25 Cutoff <25 ng/mL    Nordiazepam <25 Cutoff <25 ng/mL    Oxazepam <25 Cutoff <25 ng/mL    Temazepam <25 Cutoff <25 ng/mL    Zolpidem <25 Cutoff <25 ng/mL    Zolpidem Metabolite (ZCA) <25 Cutoff <25 ng/mL    6-Acetylmorphine <25 Cutoff <25  ng/mL    Codeine <50 Cutoff <50 ng/mL    Hydrocodone <25 Cutoff <25 ng/mL    Hydromorphone <25 Cutoff <25 ng/mL    Morphine Urine <50 Cutoff <50 ng/mL    Norhydrocodone <25 Cutoff <25 ng/mL    Noroxycodone <25 Cutoff <25 ng/mL    Oxycodone <25 Cutoff <25 ng/mL    Oxymorphone <25 Cutoff <25 ng/mL    Tramadol <50 Cutoff <50 ng/mL    O-Desmethyltramadol <50 Cutoff <50 ng/mL    Fentanyl <2.5 Cutoff<2.5 ng/mL    Norfentanyl <2.5 Cutoff<2.5 ng/mL    METHADONE CONFIRMATION,URINE <25 Cutoff <25 ng/mL    EDDP <25 Cutoff <25 ng/mL     Results are as expected.     Controlled Substance Agreement:  Date of the Last Agreement: 03/07/2023  Reviewed Controlled Substance Agreement including but not limited to the benefits, risks, and alternatives to treatment with a Controlled Substance medication(s).    Opioids:  What is the patient's goal of therapy? Improved pain  Is this being achieved with current treatment? yes    I have calculated the patient's Morphine Dose Equivalent (MED):   I have considered referral to Pain Management and/or a specialist, and do not feel it is necessary at this time.    I feel that it is clinically indicated to continue this current medication regimen after consideration of alternative therapies, and other non-opioid treatment.    Opioid Risk Screening:  No data recorded    Pain Assessment:  No data recorded    Objective   Physical Exam  Vitals reviewed.   Constitutional:       Appearance: Normal appearance.   HENT:      Head: Normocephalic and atraumatic.      Right Ear: Tympanic membrane normal.      Left Ear: Tympanic membrane normal.      Nose: Nose normal.      Mouth/Throat:      Mouth: Mucous membranes are moist.      Pharynx: Oropharynx is clear.   Eyes:      Extraocular Movements: Extraocular movements intact.      Conjunctiva/sclera: Conjunctivae normal.      Pupils: Pupils are equal, round, and reactive to light.   Cardiovascular:      Rate and Rhythm: Normal rate and regular rhythm.       Pulses: Normal pulses.      Heart sounds: Normal heart sounds.   Pulmonary:      Effort: Pulmonary effort is normal.      Breath sounds: Normal breath sounds.   Abdominal:      General: Abdomen is flat. Bowel sounds are normal.      Palpations: Abdomen is soft.   Musculoskeletal:         General: Normal range of motion.      Cervical back: Normal range of motion and neck supple.   Skin:     General: Skin is warm and dry.      Capillary Refill: Capillary refill takes less than 2 seconds.   Neurological:      General: No focal deficit present.      Mental Status: She is alert and oriented to person, place, and time.   Psychiatric:         Mood and Affect: Mood normal.         Behavior: Behavior normal.       Assessment/Plan   Diagnoses and all orders for this visit:  Diabetes mellitus with proteinuria (CMS/[facility])  Type 2 diabetes mellitus with hyperglycemia, with long-term current use of insulin (CMS/[facility])  -     Follow Up In Primary Care  -     POCT glycosylated hemoglobin (Hb A1C) manually resulted  -     TSH with reflex to Free T4 if abnormal; Future  -     CBC and Auto Differential; Future  -     Comprehensive Metabolic Panel; Future  -     Lipid Panel; Future  -     TSH with reflex to Free T4 if abnormal; Future  Chronic pain syndrome  -     Follow Up In Primary Care  -     HYDROcodone-acetaminophen (Norco) 5-325 mg tablet; Take 1 tablet by mouth every 8 hours if needed for severe pain (7 - 10).  -     HYDROcodone-acetaminophen (Norco) 5-325 mg tablet; Take 1 tablet by mouth every 8 hours if needed for severe pain (7 - 10). Do not start before June 9, 2023.  -     HYDROcodone-acetaminophen (Norco) 5-325 mg tablet; Take 1 tablet by mouth every 8 hours if needed for severe pain (7 - 10). Do not start before July 9, 2023.  Overweight with body mass index (BMI) of 28 to 28.9 in adult  Paroxysmal atrial fibrillation (CMS/[facility])  -     TSH with reflex to Free T4 if abnormal; Future  -     CBC and Auto Differential;  Future  -     Comprehensive Metabolic Panel; Future  -     Lipid Panel; Future  -     TSH with reflex to Free T4 if abnormal; Future  Chronic right-sided congestive heart failure (CMS/HCC)  -     TSH with reflex to Free T4 if abnormal; Future  -     CBC and Auto Differential; Future  -     Comprehensive Metabolic Panel; Future  -     Lipid Panel; Future  -     TSH with reflex to Free T4 if abnormal; Future  CVD (cardiovascular disease)  Mononeuropathy  Cerebrovascular accident (CVA), unspecified mechanism (CMS/HCC)  Lacunar stroke of right subthalamic region (CMS/HCC)  Neuralgia  CAD in native artery  Constipation due to opioid therapy  Seasonal allergic rhinitis due to pollen  Mixed hyperlipidemia  -     TSH with reflex to Free T4 if abnormal; Future  -     CBC and Auto Differential; Future  -     Comprehensive Metabolic Panel; Future  -     Lipid Panel; Future  -     TSH with reflex to Free T4 if abnormal; Future  Long-term use of high-risk medication  -     HYDROcodone-acetaminophen (Norco) 5-325 mg tablet; Take 1 tablet by mouth every 8 hours if needed for severe pain (7 - 10). Do not start before June 9, 2023.  -     HYDROcodone-acetaminophen (Norco) 5-325 mg tablet; Take 1 tablet by mouth every 8 hours if needed for severe pain (7 - 10). Do not start before July 9, 2023.  Other orders  -     Follow Up In Primary Care    Will check A1C today. 5.9% indicating excellent control and improvement to the point patient no longer requires insulin and has lost 50 pounds.   Flor has been using her CGM to monitor her sugars with great success.   Follow up three months.

## 2023-06-20 LAB
ALBUMIN (MG/L) IN URINE: 8 MG/L
ALBUMIN/CREATININE (UG/MG) IN URINE: 8.2 UG/MG CRT (ref 0–30)
ANION GAP IN SER/PLAS: 9 MMOL/L (ref 10–20)
CALCIUM (MG/DL) IN SER/PLAS: 9.4 MG/DL (ref 8.6–10.3)
CARBON DIOXIDE, TOTAL (MMOL/L) IN SER/PLAS: 28 MMOL/L (ref 21–32)
CHLORIDE (MMOL/L) IN SER/PLAS: 104 MMOL/L (ref 98–107)
CHOLESTEROL (MG/DL) IN SER/PLAS: 204 MG/DL (ref 0–199)
CHOLESTEROL IN HDL (MG/DL) IN SER/PLAS: 44.7 MG/DL
CHOLESTEROL/HDL RATIO: 4.6
CREATININE (MG/DL) IN SER/PLAS: 0.89 MG/DL (ref 0.5–1.05)
CREATININE (MG/DL) IN URINE: 97.2 MG/DL (ref 20–320)
ESTIMATED AVERAGE GLUCOSE FOR HBA1C: 108 MG/DL
GFR FEMALE: 68 ML/MIN/1.73M2
GLUCOSE (MG/DL) IN SER/PLAS: 97 MG/DL (ref 74–99)
HEMOGLOBIN A1C/HEMOGLOBIN TOTAL IN BLOOD: 5.4 %
LDL: 135 MG/DL (ref 0–99)
POTASSIUM (MMOL/L) IN SER/PLAS: 4.2 MMOL/L (ref 3.5–5.3)
SODIUM (MMOL/L) IN SER/PLAS: 137 MMOL/L (ref 136–145)
TRIGLYCERIDE (MG/DL) IN SER/PLAS: 120 MG/DL (ref 0–149)
UREA NITROGEN (MG/DL) IN SER/PLAS: 13 MG/DL (ref 6–23)
VLDL: 24 MG/DL (ref 0–40)

## 2023-07-26 ENCOUNTER — TELEPHONE (OUTPATIENT)
Dept: PRIMARY CARE | Facility: CLINIC | Age: 73
End: 2023-07-26
Payer: MEDICARE

## 2023-07-26 DIAGNOSIS — R30.0 DYSURIA: Primary | ICD-10-CM

## 2023-07-26 RX ORDER — NITROFURANTOIN 25; 75 MG/1; MG/1
100 CAPSULE ORAL 2 TIMES DAILY
Qty: 14 CAPSULE | Refills: 0 | Status: SHIPPED | OUTPATIENT
Start: 2023-07-26 | End: 2023-08-08 | Stop reason: ALTCHOICE

## 2023-08-08 ENCOUNTER — OFFICE VISIT (OUTPATIENT)
Dept: PRIMARY CARE | Facility: CLINIC | Age: 73
End: 2023-08-08
Payer: MEDICARE

## 2023-08-08 VITALS
HEIGHT: 68 IN | BODY MASS INDEX: 27.46 KG/M2 | TEMPERATURE: 97 F | OXYGEN SATURATION: 98 % | RESPIRATION RATE: 18 BRPM | HEART RATE: 58 BPM | WEIGHT: 181.2 LBS | SYSTOLIC BLOOD PRESSURE: 130 MMHG | DIASTOLIC BLOOD PRESSURE: 78 MMHG

## 2023-08-08 DIAGNOSIS — L97.511 ULCER OF RIGHT FOOT, LIMITED TO BREAKDOWN OF SKIN (MULTI): ICD-10-CM

## 2023-08-08 DIAGNOSIS — E78.2 MIXED HYPERLIPIDEMIA: ICD-10-CM

## 2023-08-08 DIAGNOSIS — E11.65 TYPE 2 DIABETES MELLITUS WITH HYPERGLYCEMIA, WITH LONG-TERM CURRENT USE OF INSULIN (MULTI): ICD-10-CM

## 2023-08-08 DIAGNOSIS — Z79.899 LONG-TERM USE OF HIGH-RISK MEDICATION: ICD-10-CM

## 2023-08-08 DIAGNOSIS — G62.9 PERIPHERAL POLYNEUROPATHY: ICD-10-CM

## 2023-08-08 DIAGNOSIS — J30.9 ALLERGIC RHINITIS, UNSPECIFIED SEASONALITY, UNSPECIFIED TRIGGER: ICD-10-CM

## 2023-08-08 DIAGNOSIS — Z79.4 TYPE 2 DIABETES MELLITUS WITH HYPERGLYCEMIA, WITH LONG-TERM CURRENT USE OF INSULIN (MULTI): ICD-10-CM

## 2023-08-08 DIAGNOSIS — I48.0 PAROXYSMAL ATRIAL FIBRILLATION (MULTI): ICD-10-CM

## 2023-08-08 DIAGNOSIS — G89.4 CHRONIC PAIN SYNDROME: ICD-10-CM

## 2023-08-08 DIAGNOSIS — J01.81 OTHER ACUTE RECURRENT SINUSITIS: ICD-10-CM

## 2023-08-08 DIAGNOSIS — I50.812 CHRONIC RIGHT-SIDED CONGESTIVE HEART FAILURE (MULTI): ICD-10-CM

## 2023-08-08 DIAGNOSIS — I25.10 CVD (CARDIOVASCULAR DISEASE): ICD-10-CM

## 2023-08-08 DIAGNOSIS — I25.10 CAD IN NATIVE ARTERY: ICD-10-CM

## 2023-08-08 DIAGNOSIS — C50.919 MALIGNANT NEOPLASM OF FEMALE BREAST, UNSPECIFIED ESTROGEN RECEPTOR STATUS, UNSPECIFIED LATERALITY, UNSPECIFIED SITE OF BREAST (MULTI): Primary | ICD-10-CM

## 2023-08-08 DIAGNOSIS — E78.2 HYPERLIPEMIA, MIXED: ICD-10-CM

## 2023-08-08 PROBLEM — I70.229 CRITICAL ISCHEMIA OF LOWER EXTREMITY (MULTI): Status: RESOLVED | Noted: 2023-01-20 | Resolved: 2023-08-08

## 2023-08-08 PROBLEM — I63.9 CVA (CEREBRAL VASCULAR ACCIDENT) (MULTI): Status: RESOLVED | Noted: 2023-01-20 | Resolved: 2023-08-08

## 2023-08-08 LAB — POC HEMOGLOBIN A1C: 5.2 % (ref 4.2–6.5)

## 2023-08-08 PROCEDURE — 83036 HEMOGLOBIN GLYCOSYLATED A1C: CPT | Performed by: FAMILY MEDICINE

## 2023-08-08 PROCEDURE — 99214 OFFICE O/P EST MOD 30 MIN: CPT | Performed by: FAMILY MEDICINE

## 2023-08-08 PROCEDURE — 3078F DIAST BP <80 MM HG: CPT | Performed by: FAMILY MEDICINE

## 2023-08-08 PROCEDURE — 1160F RVW MEDS BY RX/DR IN RCRD: CPT | Performed by: FAMILY MEDICINE

## 2023-08-08 PROCEDURE — 3044F HG A1C LEVEL LT 7.0%: CPT | Performed by: FAMILY MEDICINE

## 2023-08-08 PROCEDURE — 3075F SYST BP GE 130 - 139MM HG: CPT | Performed by: FAMILY MEDICINE

## 2023-08-08 PROCEDURE — 3008F BODY MASS INDEX DOCD: CPT | Performed by: FAMILY MEDICINE

## 2023-08-08 PROCEDURE — 1036F TOBACCO NON-USER: CPT | Performed by: FAMILY MEDICINE

## 2023-08-08 PROCEDURE — 1159F MED LIST DOCD IN RCRD: CPT | Performed by: FAMILY MEDICINE

## 2023-08-08 PROCEDURE — 1126F AMNT PAIN NOTED NONE PRSNT: CPT | Performed by: FAMILY MEDICINE

## 2023-08-08 RX ORDER — METOPROLOL SUCCINATE 25 MG/1
25 TABLET, EXTENDED RELEASE ORAL DAILY
COMMUNITY
End: 2023-11-07

## 2023-08-08 RX ORDER — TIRZEPATIDE 10 MG/.5ML
10 INJECTION, SOLUTION SUBCUTANEOUS
Qty: 2 ML | Refills: 5 | Status: SHIPPED | OUTPATIENT
Start: 2023-08-08 | End: 2023-11-07 | Stop reason: ALTCHOICE

## 2023-08-08 RX ORDER — PSEUDOEPHEDRINE HCL 120 MG/1
120 TABLET, FILM COATED, EXTENDED RELEASE ORAL EVERY 12 HOURS PRN
Qty: 60 TABLET | Refills: 11 | Status: SHIPPED | OUTPATIENT
Start: 2023-08-08 | End: 2024-08-07

## 2023-08-08 RX ORDER — HYDROCODONE BITARTRATE AND ACETAMINOPHEN 5; 325 MG/1; MG/1
1 TABLET ORAL EVERY 8 HOURS PRN
Qty: 90 TABLET | Refills: 0 | Status: SHIPPED | OUTPATIENT
Start: 2023-08-08 | End: 2023-08-22 | Stop reason: SDUPTHER

## 2023-08-08 RX ORDER — GABAPENTIN 300 MG/1
300 CAPSULE ORAL EVERY 4 HOURS
Qty: 180 CAPSULE | Refills: 11 | Status: SHIPPED | OUTPATIENT
Start: 2023-08-08 | End: 2023-11-07 | Stop reason: SDUPTHER

## 2023-08-08 ASSESSMENT — LIFESTYLE VARIABLES
AUDIT-C TOTAL SCORE: 0
HOW OFTEN DO YOU HAVE SIX OR MORE DRINKS ON ONE OCCASION: NEVER
SKIP TO QUESTIONS 9-10: 1
HOW MANY STANDARD DRINKS CONTAINING ALCOHOL DO YOU HAVE ON A TYPICAL DAY: PATIENT DOES NOT DRINK
HOW OFTEN DO YOU HAVE A DRINK CONTAINING ALCOHOL: NEVER

## 2023-08-08 ASSESSMENT — ENCOUNTER SYMPTOMS
ARTHRALGIAS: 1
WOUND: 1
UNEXPECTED WEIGHT CHANGE: 1
SHORTNESS OF BREATH: 1

## 2023-08-08 NOTE — PROGRESS NOTES
Subjective   Patient ID: Kerry Spence is a 72 y.o. female.    HPI  72 year old for three month follow up.   OARRS:  Anca Jerry MD on 8/8/2023  1:44 PM  I have personally reviewed the OARRS report for Kerry Spence. I have considered the risks of abuse, dependence, addiction and diversion and I believe that it is clinically appropriate for Kerry Spence to be prescribed this medication    Is the patient prescribed a combination of a benzodiazepine and opioid?  No    Last Urine Drug Screen / ordered today: Yes  Recent Results (from the past 57785 hour(s))   OPIATE/OPIOID/BENZO PRESCRIPTION COMPLIANCE    Collection Time: 03/07/23 11:30 AM   Result Value Ref Range    DRUG SCREEN COMMENT URINE SEE BELOW     Creatine, Urine 241.4 mg/dL    Amphetamine Screen, Urine PRESUMPTIVE NEGATIVE NEGATIVE    Barbiturate Screen, Urine PRESUMPTIVE NEGATIVE NEGATIVE    Cannabinoid Screen, Urine PRESUMPTIVE NEGATIVE NEGATIVE    Cocaine Screen, Urine PRESUMPTIVE NEGATIVE NEGATIVE    PCP Screen, Urine PRESUMPTIVE NEGATIVE NEGATIVE    7-Aminoclonazepam <25 Cutoff <25 ng/mL    Alpha-Hydroxyalprazolam <25 Cutoff <25 ng/mL    Alpha-Hydroxymidazolam <25 Cutoff <25 ng/mL    Alprazolam <25 Cutoff <25 ng/mL    Chlordiazepoxide <25 Cutoff <25 ng/mL    Clonazepam <25 Cutoff <25 ng/mL    Diazepam <25 Cutoff <25 ng/mL    Lorazepam <25 Cutoff <25 ng/mL    Midazolam <25 Cutoff <25 ng/mL    Nordiazepam <25 Cutoff <25 ng/mL    Oxazepam <25 Cutoff <25 ng/mL    Temazepam <25 Cutoff <25 ng/mL    Zolpidem <25 Cutoff <25 ng/mL    Zolpidem Metabolite (ZCA) <25 Cutoff <25 ng/mL    6-Acetylmorphine <25 Cutoff <25 ng/mL    Codeine <50 Cutoff <50 ng/mL    Hydrocodone <25 Cutoff <25 ng/mL    Hydromorphone <25 Cutoff <25 ng/mL    Morphine Urine <50 Cutoff <50 ng/mL    Norhydrocodone <25 Cutoff <25 ng/mL    Noroxycodone <25 Cutoff <25 ng/mL    Oxycodone <25 Cutoff <25 ng/mL    Oxymorphone <25 Cutoff <25 ng/mL    Tramadol <50 Cutoff <50 ng/mL     O-Desmethyltramadol <50 Cutoff <50 ng/mL    Fentanyl <2.5 Cutoff<2.5 ng/mL    Norfentanyl <2.5 Cutoff<2.5 ng/mL    METHADONE CONFIRMATION,URINE <25 Cutoff <25 ng/mL    EDDP <25 Cutoff <25 ng/mL     Results are as expected.     Controlled Substance Agreement:  Date of the Last Agreement: 02/2023  Reviewed Controlled Substance Agreement including but not limited to the benefits, risks, and alternatives to treatment with a Controlled Substance medication(s).    Opioids:  What is the patient's goal of therapy? Improved pain  Is this being achieved with current treatment? yes    I have calculated the patient's Morphine Dose Equivalent (MED):   I have considered referral to Pain Management and/or a specialist, and do not feel it is necessary at this time.    I feel that it is clinically indicated to continue this current medication regimen after consideration of alternative therapies, and other non-opioid treatment.    Opioid Risk Screening:  No data recorded    Pain Assessment:  On and off, worse at night     Review of Systems   Constitutional:  Positive for unexpected weight change (231-180).   Respiratory:  Positive for shortness of breath.    Musculoskeletal:  Positive for arthralgias.   Skin:  Positive for wound (Ulcer on foot).   All other systems reviewed and are negative.      Objective   Physical Exam  Vitals reviewed.   Constitutional:       Appearance: Normal appearance.   HENT:      Head: Normocephalic and atraumatic.      Right Ear: Tympanic membrane normal.      Left Ear: Tympanic membrane normal.      Nose: Nose normal.      Mouth/Throat:      Mouth: Mucous membranes are moist.      Pharynx: Oropharynx is clear.   Eyes:      Extraocular Movements: Extraocular movements intact.      Conjunctiva/sclera: Conjunctivae normal.      Pupils: Pupils are equal, round, and reactive to light.   Cardiovascular:      Rate and Rhythm: Normal rate and regular rhythm.      Pulses: Normal pulses.      Heart sounds: Normal  heart sounds.   Pulmonary:      Effort: Pulmonary effort is normal.      Breath sounds: Normal breath sounds.   Abdominal:      General: Abdomen is flat. Bowel sounds are normal.      Palpations: Abdomen is soft.   Musculoskeletal:         General: Normal range of motion.      Cervical back: Normal range of motion and neck supple.   Skin:     General: Skin is warm and dry.      Capillary Refill: Capillary refill takes less than 2 seconds.      Findings: Lesion present.      Comments: Right foot   Neurological:      General: No focal deficit present.      Mental Status: She is alert and oriented to person, place, and time.   Psychiatric:         Mood and Affect: Mood normal.         Behavior: Behavior normal.         Assessment/Plan   Diagnoses and all orders for this visit:  Malignant neoplasm of female breast, unspecified estrogen receptor status, unspecified laterality, unspecified site of breast (CMS/HCC)  Type 2 diabetes mellitus with hyperglycemia, with long-term current use of insulin (CMS/HCC)  -     POCT glycosylated hemoglobin (Hb A1C) manually resulted  -     tirzepatide (Mounjaro) 10 mg/0.5 mL pen injector; Inject 10 mg under the skin 1 (one) time per week. X 4 weeks  Paroxysmal atrial fibrillation (CMS/HCC)  CAD in native artery  Chronic right-sided congestive heart failure (CMS/HCC)  CVD (cardiovascular disease)  Hyperlipemia, mixed  Mixed hyperlipidemia  Chronic pain syndrome  -     HYDROcodone-acetaminophen (Norco) 5-325 mg tablet; Take 1 tablet by mouth every 8 hours if needed for severe pain (7 - 10).  Peripheral polyneuropathy  -     gabapentin (Neurontin) 300 mg capsule; Take 1 capsule (300 mg) by mouth every 4 hours.  Other acute recurrent sinusitis  -     pseudoephedrine ER (Sudafed 12 Hour) 120 mg 12 hr tablet; Take 1 tablet (120 mg) by mouth every 12 hours if needed for congestion.  Allergic rhinitis, unspecified seasonality, unspecified trigger  -     pseudoephedrine ER (Sudafed 12 Hour) 120  mg 12 hr tablet; Take 1 tablet (120 mg) by mouth every 12 hours if needed for congestion.  Long-term use of high-risk medication  -     HYDROcodone-acetaminophen (Norco) 5-325 mg tablet; Take 1 tablet by mouth every 8 hours if needed for severe pain (7 - 10).

## 2023-08-08 NOTE — PATIENT INSTRUCTIONS
Diagnoses and all orders for this visit:  Malignant neoplasm of female breast, unspecified estrogen receptor status, unspecified laterality, unspecified site of breast (CMS/[facility])  Type 2 diabetes mellitus with hyperglycemia, with long-term current use of insulin (CMS/[facility])  -     POCT glycosylated hemoglobin (Hb A1C) manually resulted  -     tirzepatide (Mounjaro) 10 mg/0.5 mL pen injector; Inject 10 mg under the skin 1 (one) time per week. X 4 weeks  Paroxysmal atrial fibrillation (CMS/[facility])  CAD in native artery  Chronic right-sided congestive heart failure (CMS/[facility])  CVD (cardiovascular disease)  Hyperlipemia, mixed  Mixed hyperlipidemia  Chronic pain syndrome  -     HYDROcodone-acetaminophen (Norco) 5-325 mg tablet; Take 1 tablet by mouth every 8 hours if needed for severe pain (7 - 10).  Peripheral polyneuropathy  -     gabapentin (Neurontin) 300 mg capsule; Take 1 capsule (300 mg) by mouth every 4 hours.  Other acute recurrent sinusitis  -     pseudoephedrine ER (Sudafed 12 Hour) 120 mg 12 hr tablet; Take 1 tablet (120 mg) by mouth every 12 hours if needed for congestion.  Allergic rhinitis, unspecified seasonality, unspecified trigger  -     pseudoephedrine ER (Sudafed 12 Hour) 120 mg 12 hr tablet; Take 1 tablet (120 mg) by mouth every 12 hours if needed for congestion.  Long-term use of high-risk medication  -     HYDROcodone-acetaminophen (Norco) 5-325 mg tablet; Take 1 tablet by mouth every 8 hours if needed for severe pain (7 - 10).

## 2023-08-21 ENCOUNTER — TELEPHONE (OUTPATIENT)
Dept: PRIMARY CARE | Facility: CLINIC | Age: 73
End: 2023-08-21
Payer: MEDICARE

## 2023-08-21 DIAGNOSIS — Z79.899 LONG-TERM USE OF HIGH-RISK MEDICATION: ICD-10-CM

## 2023-08-21 DIAGNOSIS — G62.9 PERIPHERAL POLYNEUROPATHY: Primary | ICD-10-CM

## 2023-08-21 DIAGNOSIS — G89.4 CHRONIC PAIN SYNDROME: ICD-10-CM

## 2023-08-21 NOTE — TELEPHONE ENCOUNTER
[facility] in [place] needs clarification on the Norco script dx code, they want more than the Chronic pain syndrome. In order to refill the script.

## 2023-08-22 RX ORDER — HYDROCODONE BITARTRATE AND ACETAMINOPHEN 5; 325 MG/1; MG/1
1 TABLET ORAL EVERY 8 HOURS PRN
Qty: 90 TABLET | Refills: 0 | Status: SHIPPED | OUTPATIENT
Start: 2023-08-22 | End: 2023-09-21

## 2023-08-22 RX ORDER — HYDROCODONE BITARTRATE AND ACETAMINOPHEN 5; 325 MG/1; MG/1
1 TABLET ORAL EVERY 8 HOURS PRN
Qty: 90 TABLET | Refills: 0 | Status: SHIPPED | OUTPATIENT
Start: 2023-08-22 | End: 2023-08-22 | Stop reason: SDUPTHER

## 2023-11-07 ENCOUNTER — OFFICE VISIT (OUTPATIENT)
Dept: PRIMARY CARE | Facility: CLINIC | Age: 73
End: 2023-11-07
Payer: MEDICARE

## 2023-11-07 VITALS
SYSTOLIC BLOOD PRESSURE: 142 MMHG | BODY MASS INDEX: 27.83 KG/M2 | DIASTOLIC BLOOD PRESSURE: 80 MMHG | HEIGHT: 68 IN | HEART RATE: 82 BPM | WEIGHT: 183.6 LBS | OXYGEN SATURATION: 97 %

## 2023-11-07 DIAGNOSIS — E11.65 TYPE 2 DIABETES MELLITUS WITH HYPERGLYCEMIA, WITHOUT LONG-TERM CURRENT USE OF INSULIN (MULTI): ICD-10-CM

## 2023-11-07 DIAGNOSIS — M25.512 CHRONIC LEFT SHOULDER PAIN: Primary | ICD-10-CM

## 2023-11-07 DIAGNOSIS — I48.0 PAROXYSMAL ATRIAL FIBRILLATION (MULTI): ICD-10-CM

## 2023-11-07 DIAGNOSIS — E11.29 DIABETES MELLITUS WITH PROTEINURIA (MULTI): ICD-10-CM

## 2023-11-07 DIAGNOSIS — E78.2 HYPERLIPEMIA, MIXED: ICD-10-CM

## 2023-11-07 DIAGNOSIS — R80.9 DIABETES MELLITUS WITH PROTEINURIA (MULTI): ICD-10-CM

## 2023-11-07 DIAGNOSIS — I25.10 CAD IN NATIVE ARTERY: ICD-10-CM

## 2023-11-07 DIAGNOSIS — E66.3 OVERWEIGHT WITH BODY MASS INDEX (BMI) OF 27 TO 27.9 IN ADULT: ICD-10-CM

## 2023-11-07 DIAGNOSIS — G89.4 CHRONIC PAIN SYNDROME: ICD-10-CM

## 2023-11-07 DIAGNOSIS — I50.812 CHRONIC RIGHT-SIDED CONGESTIVE HEART FAILURE (MULTI): ICD-10-CM

## 2023-11-07 DIAGNOSIS — I63.81: ICD-10-CM

## 2023-11-07 DIAGNOSIS — G62.9 PERIPHERAL POLYNEUROPATHY: ICD-10-CM

## 2023-11-07 DIAGNOSIS — C50.919 MALIGNANT NEOPLASM OF FEMALE BREAST, UNSPECIFIED ESTROGEN RECEPTOR STATUS, UNSPECIFIED LATERALITY, UNSPECIFIED SITE OF BREAST (MULTI): ICD-10-CM

## 2023-11-07 DIAGNOSIS — G89.29 CHRONIC LEFT SHOULDER PAIN: Primary | ICD-10-CM

## 2023-11-07 DIAGNOSIS — E11.29 DIABETES MELLITUS WITH PROTEINURIA (MULTI): Primary | ICD-10-CM

## 2023-11-07 DIAGNOSIS — R80.9 DIABETES MELLITUS WITH PROTEINURIA (MULTI): Primary | ICD-10-CM

## 2023-11-07 PROBLEM — J01.90 ACUTE SINUSITIS: Status: RESOLVED | Noted: 2023-01-20 | Resolved: 2023-11-07

## 2023-11-07 PROBLEM — J30.9 ALLERGIC RHINITIS: Status: RESOLVED | Noted: 2023-01-20 | Resolved: 2023-11-07

## 2023-11-07 PROBLEM — M75.82 TENDINITIS OF LEFT ROTATOR CUFF: Status: RESOLVED | Noted: 2023-01-20 | Resolved: 2023-11-07

## 2023-11-07 PROBLEM — R21 RASH, SKIN: Status: RESOLVED | Noted: 2023-01-20 | Resolved: 2023-11-07

## 2023-11-07 PROBLEM — R00.2 HEART PALPITATIONS: Status: RESOLVED | Noted: 2023-01-20 | Resolved: 2023-11-07

## 2023-11-07 PROBLEM — Z98.61 S/P PTCA (PERCUTANEOUS TRANSLUMINAL CORONARY ANGIOPLASTY): Status: ACTIVE | Noted: 2023-11-07

## 2023-11-07 PROBLEM — R05.9 COUGH: Status: RESOLVED | Noted: 2023-01-20 | Resolved: 2023-11-07

## 2023-11-07 PROBLEM — H81.10 BENIGN POSITIONAL VERTIGO: Status: RESOLVED | Noted: 2023-01-20 | Resolved: 2023-11-07

## 2023-11-07 PROBLEM — R23.3 PETECHIAL RASH: Status: RESOLVED | Noted: 2023-01-20 | Resolved: 2023-11-07

## 2023-11-07 PROBLEM — R93.1 ABNORMAL ECHOCARDIOGRAM: Status: RESOLVED | Noted: 2023-01-20 | Resolved: 2023-11-07

## 2023-11-07 PROBLEM — Z20.822 SUSPECTED COVID-19 VIRUS INFECTION: Status: RESOLVED | Noted: 2023-01-20 | Resolved: 2023-11-07

## 2023-11-07 PROBLEM — I48.91 ATRIAL FIBRILLATION (MULTI): Status: RESOLVED | Noted: 2023-01-20 | Resolved: 2023-11-07

## 2023-11-07 PROBLEM — R30.0 DYSURIA: Status: RESOLVED | Noted: 2023-01-20 | Resolved: 2023-11-07

## 2023-11-07 PROBLEM — J02.9 ACUTE PHARYNGITIS: Status: RESOLVED | Noted: 2023-01-20 | Resolved: 2023-11-07

## 2023-11-07 PROBLEM — S39.012A LUMBOSACRAL STRAIN: Status: RESOLVED | Noted: 2023-01-20 | Resolved: 2023-11-07

## 2023-11-07 PROBLEM — E66.9 ADULT-ONSET OBESITY: Status: RESOLVED | Noted: 2023-01-20 | Resolved: 2023-11-07

## 2023-11-07 PROBLEM — B02.9 SHINGLES: Status: RESOLVED | Noted: 2023-01-20 | Resolved: 2023-11-07

## 2023-11-07 PROBLEM — H81.10 VERTIGO, BENIGN POSITIONAL: Status: RESOLVED | Noted: 2023-01-20 | Resolved: 2023-11-07

## 2023-11-07 PROBLEM — B02.29 HZV (HERPES ZOSTER VIRUS) POST HERPETIC NEURALGIA: Status: RESOLVED | Noted: 2023-01-20 | Resolved: 2023-11-07

## 2023-11-07 PROBLEM — R19.7 DIARRHEA: Status: RESOLVED | Noted: 2023-01-20 | Resolved: 2023-11-07

## 2023-11-07 PROBLEM — Z98.61 S/P PTCA (PERCUTANEOUS TRANSLUMINAL CORONARY ANGIOPLASTY): Status: RESOLVED | Noted: 2023-01-20 | Resolved: 2023-11-07

## 2023-11-07 PROBLEM — M62.81 MUSCLE WEAKNESS OF LOWER EXTREMITY: Status: RESOLVED | Noted: 2023-01-20 | Resolved: 2023-11-07

## 2023-11-07 PROBLEM — K04.7 DENTAL ABSCESS: Status: RESOLVED | Noted: 2023-01-20 | Resolved: 2023-11-07

## 2023-11-07 PROBLEM — R06.00 DYSPNEA: Status: RESOLVED | Noted: 2023-01-20 | Resolved: 2023-11-07

## 2023-11-07 PROBLEM — I51.9 LV DYSFUNCTION: Status: RESOLVED | Noted: 2023-01-20 | Resolved: 2023-11-07

## 2023-11-07 PROBLEM — R52 GENERALIZED BODY ACHES: Status: RESOLVED | Noted: 2023-01-20 | Resolved: 2023-11-07

## 2023-11-07 PROBLEM — U07.1 COVID-19 VIRUS INFECTION: Status: RESOLVED | Noted: 2023-01-20 | Resolved: 2023-11-07

## 2023-11-07 PROBLEM — D17.21 LIPOMA OF RIGHT UPPER EXTREMITY: Status: RESOLVED | Noted: 2023-01-20 | Resolved: 2023-11-07

## 2023-11-07 PROBLEM — R93.89 ABNORMAL CT OF THE CHEST: Status: RESOLVED | Noted: 2023-01-20 | Resolved: 2023-11-07

## 2023-11-07 PROBLEM — R93.1 ABNORMAL HEART SCORE CT: Status: RESOLVED | Noted: 2023-01-20 | Resolved: 2023-11-07

## 2023-11-07 PROCEDURE — 99214 OFFICE O/P EST MOD 30 MIN: CPT | Performed by: FAMILY MEDICINE

## 2023-11-07 PROCEDURE — 1036F TOBACCO NON-USER: CPT | Performed by: FAMILY MEDICINE

## 2023-11-07 PROCEDURE — 3079F DIAST BP 80-89 MM HG: CPT | Performed by: FAMILY MEDICINE

## 2023-11-07 PROCEDURE — 1160F RVW MEDS BY RX/DR IN RCRD: CPT | Performed by: FAMILY MEDICINE

## 2023-11-07 PROCEDURE — 1159F MED LIST DOCD IN RCRD: CPT | Performed by: FAMILY MEDICINE

## 2023-11-07 PROCEDURE — 3008F BODY MASS INDEX DOCD: CPT | Performed by: FAMILY MEDICINE

## 2023-11-07 PROCEDURE — 3044F HG A1C LEVEL LT 7.0%: CPT | Performed by: FAMILY MEDICINE

## 2023-11-07 PROCEDURE — 3077F SYST BP >= 140 MM HG: CPT | Performed by: FAMILY MEDICINE

## 2023-11-07 PROCEDURE — 1126F AMNT PAIN NOTED NONE PRSNT: CPT | Performed by: FAMILY MEDICINE

## 2023-11-07 RX ORDER — GABAPENTIN 300 MG/1
300 CAPSULE ORAL EVERY 4 HOURS
Qty: 540 CAPSULE | Refills: 1 | Status: SHIPPED | OUTPATIENT
Start: 2023-11-07 | End: 2024-04-10 | Stop reason: SDUPTHER

## 2023-11-07 RX ORDER — HYDROCODONE BITARTRATE AND ACETAMINOPHEN 5; 325 MG/1; MG/1
1 TABLET ORAL EVERY 8 HOURS PRN
Qty: 90 TABLET | Refills: 0 | Status: SHIPPED | OUTPATIENT
Start: 2023-11-07 | End: 2023-12-07

## 2023-11-07 RX ORDER — TIRZEPATIDE 15 MG/.5ML
15 INJECTION, SOLUTION SUBCUTANEOUS
COMMUNITY
End: 2024-01-05 | Stop reason: WASHOUT

## 2023-11-07 ASSESSMENT — ENCOUNTER SYMPTOMS
OCCASIONAL FEELINGS OF UNSTEADINESS: 0
LOSS OF SENSATION IN FEET: 0
DEPRESSION: 0
WOUND: 1
BACK PAIN: 1
ARTHRALGIAS: 1

## 2023-11-07 NOTE — PROGRESS NOTES
Subjective   Patient ID: Charlene Spence is a 72 y.o. female.    HPI  Sees podiatry tomorrow, and is doing well. May have non healing  Review of Systems   Musculoskeletal:  Positive for arthralgias and back pain.   Skin:  Positive for wound.        Feet, sees podiatry.    All other systems reviewed and are negative.  OARRS:  Anca Jerry MD on 11/7/2023  1:36 PM  I have personally reviewed the OARRS report for Kerry Spence. I have considered the risks of abuse, dependence, addiction and diversion and I believe that it is clinically appropriate for Kerry Spence to be prescribed this medication    Is the patient prescribed a combination of a benzodiazepine and opioid?  Yes, I feel it is clincially indicated to continue the medication and have discussed with the patient risks/benefits/alternatives.    Last Urine Drug Screen / ordered today: Yes  Recent Results (from the past 8760 hour(s))   OPIATE/OPIOID/BENZO PRESCRIPTION COMPLIANCE    Collection Time: 03/07/23 11:30 AM   Result Value Ref Range    DRUG SCREEN COMMENT URINE SEE BELOW     Creatine, Urine 241.4 mg/dL    Amphetamine Screen, Urine PRESUMPTIVE NEGATIVE NEGATIVE    Barbiturate Screen, Urine PRESUMPTIVE NEGATIVE NEGATIVE    Cannabinoid Screen, Urine PRESUMPTIVE NEGATIVE NEGATIVE    Cocaine Screen, Urine PRESUMPTIVE NEGATIVE NEGATIVE    PCP Screen, Urine PRESUMPTIVE NEGATIVE NEGATIVE    7-Aminoclonazepam <25 Cutoff <25 ng/mL    Alpha-Hydroxyalprazolam <25 Cutoff <25 ng/mL    Alpha-Hydroxymidazolam <25 Cutoff <25 ng/mL    Alprazolam <25 Cutoff <25 ng/mL    Chlordiazepoxide <25 Cutoff <25 ng/mL    Clonazepam <25 Cutoff <25 ng/mL    Diazepam <25 Cutoff <25 ng/mL    Lorazepam <25 Cutoff <25 ng/mL    Midazolam <25 Cutoff <25 ng/mL    Nordiazepam <25 Cutoff <25 ng/mL    Oxazepam <25 Cutoff <25 ng/mL    Temazepam <25 Cutoff <25 ng/mL    Zolpidem <25 Cutoff <25 ng/mL    Zolpidem Metabolite (ZCA) <25 Cutoff <25 ng/mL    6-Acetylmorphine <25 Cutoff <25 ng/mL     Codeine <50 Cutoff <50 ng/mL    Hydrocodone <25 Cutoff <25 ng/mL    Hydromorphone <25 Cutoff <25 ng/mL    Morphine Urine <50 Cutoff <50 ng/mL    Norhydrocodone <25 Cutoff <25 ng/mL    Noroxycodone <25 Cutoff <25 ng/mL    Oxycodone <25 Cutoff <25 ng/mL    Oxymorphone <25 Cutoff <25 ng/mL    Tramadol <50 Cutoff <50 ng/mL    O-Desmethyltramadol <50 Cutoff <50 ng/mL    Fentanyl <2.5 Cutoff<2.5 ng/mL    Norfentanyl <2.5 Cutoff<2.5 ng/mL    METHADONE CONFIRMATION,URINE <25 Cutoff <25 ng/mL    EDDP <25 Cutoff <25 ng/mL     Results are as expected.         Controlled Substance Agreement:  Date of the Last Agreement: 03/2023  Reviewed Controlled Substance Agreement including but not limited to the benefits, risks, and alternatives to treatment with a Controlled Substance medication(s).    Opioids:  What is the patient's goal of therapy? improved  Is this being achieved with current treatment? Yes    I have calculated the patient's Morphine Dose Equivalent (MED):   I have considered referral to Pain Management and/or a specialist, and do not feel it is necessary at this time.    I feel that it is clinically indicated to continue this current medication regimen after consideration of alternative therapies, and other non-opioid treatment.    Opioid Risk Screening:  No data recorded    Pain Assessment:  3/10 secondary to neuropathy    Objective   Physical Exam  Vitals reviewed.   Constitutional:       Appearance: Normal appearance.   HENT:      Head: Normocephalic and atraumatic.      Right Ear: Tympanic membrane normal.      Left Ear: Tympanic membrane normal.      Nose: Nose normal.      Mouth/Throat:      Mouth: Mucous membranes are moist.      Pharynx: Oropharynx is clear.   Eyes:      Extraocular Movements: Extraocular movements intact.      Conjunctiva/sclera: Conjunctivae normal.      Pupils: Pupils are equal, round, and reactive to light.   Cardiovascular:      Rate and Rhythm: Normal rate and regular rhythm.       Pulses: Normal pulses.      Heart sounds: Normal heart sounds.   Pulmonary:      Effort: Pulmonary effort is normal.      Breath sounds: Normal breath sounds.   Abdominal:      General: Abdomen is flat. Bowel sounds are normal.      Palpations: Abdomen is soft.   Musculoskeletal:         General: Normal range of motion.      Cervical back: Normal range of motion and neck supple.   Skin:     General: Skin is warm and dry.      Capillary Refill: Capillary refill takes less than 2 seconds.   Neurological:      General: No focal deficit present.      Mental Status: She is alert and oriented to person, place, and time.   Psychiatric:         Mood and Affect: Mood normal.         Behavior: Behavior normal.       Assessment/Plan   Diagnoses and all orders for this visit:  Chronic left shoulder pain  -     HYDROcodone-acetaminophen (Norco) 5-325 mg tablet; Take 1 tablet by mouth every 8 hours if needed for severe pain (7 - 10).  Peripheral polyneuropathy  -     gabapentin (Neurontin) 300 mg capsule; Take 1 capsule (300 mg) by mouth every 4 hours.  Diabetes mellitus with proteinuria (CMS/HCC)  -     Follow Up In [facility] - PCP - Medicare Annual; Future  Type 2 diabetes mellitus with hyperglycemia, without long-term current use of insulin (CMS/HCC)  Paroxysmal atrial fibrillation (CMS/HCC)  CAD in native artery  Chronic right-sided congestive heart failure (CMS/HCC)  Hyperlipemia, mixed  Overweight with body mass index (BMI) of 27 to 27.9 in adult  Malignant neoplasm of female breast, unspecified estrogen receptor status, unspecified laterality, unspecified site of breast (CMS/HCC)  Chronic pain syndrome  Lacunar stroke of right subthalamic region (CMS/HCC)    Follow up three months, MCR next visit, blood work order after that.     Patient was identified as a fall risk. Risk prevention instructions provided.

## 2023-11-07 NOTE — PATIENT INSTRUCTIONS
Assessment/Plan   Diagnoses and all orders for this visit:  Chronic left shoulder pain  -     HYDROcodone-acetaminophen (Norco) 5-325 mg tablet; Take 1 tablet by mouth every 8 hours if needed for severe pain (7 - 10).  Peripheral polyneuropathy  -     gabapentin (Neurontin) 300 mg capsule; Take 1 capsule (300 mg) by mouth every 4 hours.  Diabetes mellitus with proteinuria (CMS/HCC)  -     Follow Up In [facility] - PCP - Medicare Annual; Future  Type 2 diabetes mellitus with hyperglycemia, without long-term current use of insulin (CMS/HCC)  Paroxysmal atrial fibrillation (CMS/HCC)  CAD in native artery  Chronic right-sided congestive heart failure (CMS/HCC)  Hyperlipemia, mixed  Overweight with body mass index (BMI) of 27 to 27.9 in adult  Malignant neoplasm of female breast, unspecified estrogen receptor status, unspecified laterality, unspecified site of breast (CMS/HCC)  Chronic pain syndrome  Lacunar stroke of right subthalamic region (CMS/[facility])    Follow up three months, MCR next visit, blood work order after that.         Ways to Help Prevent Falls at Home    Quick Tips   ? Ask for help if you need it. Most people want to help!   ? Get up slowly after sitting or laying down   ? Wear a medical alert device or keep cell phone in your pocket   ? Use night lights, especially areas near a bathroom   ? Keep the items you use often within reach on a small stool or end table   ? Use an assistive device such as walker or cane, as directed by provider/physical therapy   ? Use a non-slip mat and grab bars in your bathroom. Look for home health sections for best options     Other Areas to Focus On   ? Exercise and nutrition: Regular exercise or taking a falls prevention class are great ways improve strength and balance. Don’t forget to stay hydrated and bring a snack!   ? Medicine side effects: Some medicines can make you sleepy or dizzy, which could cause a fall. Ask your healthcare provider about the side  effects your medicines could cause. Be sure to let them know if you take any vitamins or supplements as well.   ? Tripping hazards: Remove items you could trip on, such as loose mats, rugs, cords, and clutter. Wear closed toe shoes with rubber soles.   ? Health and wellness: Get regular checkups with your healthcare provider, plus routine vision and hearing screenings. Talk with your healthcare provider about:   o Your medicines and the possible side effects - bring them in a bag if that is easier!   o Problems with balance or feeling dizzy   o Ways to promote bone health, such as Vitamin D and calcium supplements   o Questions or concerns about falling     *Ask your healthcare team if you have questions     ©[facility], 2022

## 2023-11-08 ENCOUNTER — APPOINTMENT (OUTPATIENT)
Dept: WOUND CARE | Facility: CLINIC | Age: 73
End: 2023-11-08
Payer: MEDICARE

## 2023-11-08 ENCOUNTER — OFFICE VISIT (OUTPATIENT)
Dept: WOUND CARE | Facility: CLINIC | Age: 73
End: 2023-11-08
Payer: MEDICARE

## 2023-11-08 PROCEDURE — 11042 DBRDMT SUBQ TIS 1ST 20SQCM/<: CPT

## 2023-11-08 PROCEDURE — 99213 OFFICE O/P EST LOW 20 MIN: CPT | Mod: 25

## 2023-11-08 RX ORDER — TIRZEPATIDE 15 MG/.5ML
15 INJECTION, SOLUTION SUBCUTANEOUS
Qty: 2 ML | Refills: 3 | Status: SHIPPED | OUTPATIENT
Start: 2023-11-08 | End: 2024-01-05 | Stop reason: WASHOUT

## 2023-11-15 ENCOUNTER — OFFICE VISIT (OUTPATIENT)
Dept: WOUND CARE | Facility: CLINIC | Age: 73
End: 2023-11-15
Payer: MEDICARE

## 2023-11-15 PROCEDURE — 11042 DBRDMT SUBQ TIS 1ST 20SQCM/<: CPT

## 2023-11-29 ENCOUNTER — OFFICE VISIT (OUTPATIENT)
Dept: WOUND CARE | Facility: CLINIC | Age: 73
End: 2023-11-29
Payer: MEDICARE

## 2023-11-29 PROCEDURE — 99213 OFFICE O/P EST LOW 20 MIN: CPT | Mod: 25

## 2023-12-04 DIAGNOSIS — E11.9 TYPE 2 DIABETES MELLITUS WITHOUT COMPLICATION, UNSPECIFIED WHETHER LONG TERM INSULIN USE (MULTI): Primary | ICD-10-CM

## 2023-12-04 RX ORDER — TIRZEPATIDE 15 MG/.5ML
15 INJECTION, SOLUTION SUBCUTANEOUS
Qty: 2 ML | Refills: 5 | Status: SHIPPED | OUTPATIENT
Start: 2023-12-04 | End: 2024-01-05 | Stop reason: WASHOUT

## 2023-12-04 NOTE — TELEPHONE ENCOUNTER
Procedure Date: 03/16/2020      PREOPERATIVE DIAGNOSIS:  Osteoarthritis, right knee.      POSTOPERATIVE DIAGNOSIS:  Osteoarthritis, right knee.      PROCEDURE PERFORMED:  Right total knee arthroplasty.      SURGEON:  Froylan Dudley MD      ASSISTANT:  Cathy Simon PA-C      ANESTHESIA:  Spinal with adductor block.      ESTIMATED BLOOD LOSS:  25 mL      TOURNIQUET TIME:  Approximately 65 minutes.      COMPLICATIONS:  None.      DESCRIPTION OF PROCEDURE:  The patient was taken to the operating room where after administration of antibiotic prophylaxis, tranexamic acid and sterile prep and drape, the leg was exsanguinated and an anterior incision was made followed by a medial arthrotomy with a moderate posteromedial and anterodistal release due to his fixed varus contracture.  An intramedullary 5 degree guide was used with anterior referencing at 3 degrees of external rotation which matched the epicondylar axis and a box cut was made for PCL substitution.  Retractors were carefully placed about the proximal tibia and a cut was made perpendicular to the mechanical axis of the tibia, removing 1 mm of far posteromedial bone.  Sclerotic bone was prepared and osteophytes were removed.  A capsular injection was performed after osteophytes were removed, both under direct vision and protecting the neurovascular structures.  The tibia was prepared with rotation to match the medial and middle thirds of the tubercle at its junction and 10 mm was resected from the undersurface of a 26 mm patella and sized appropriately.  Flexion and extension gaps were rectangular and symmetric.  There was full gravity extension, although this was accomplished after releases.      After copious lavage and drying, Simplex cementing was used for a Ranjit NexGen cruciate-substituting size G femur, size 5 tibia, 10 mm polyethylene insert and a 38 mm patellar button.  Range of motion, balance and tracking were all excellent.  There was grade 1+  Rx sent to pharmacy on file     lateral laxity at 30 and 60 degrees.  Gravity flexion was excellent.  Copious antibiotic and Betadine irrigation was performed followed by layered closure over a deep drain.  There were no complications.         MARINE LOJA MD             D: 2020   T: 2020   MT: NEYMAR      Name:     ZAID DALTON   MRN:      [id number]        Account:        [id number]   :      1952           Procedure Date: 2020      Document: [id number]

## 2023-12-13 ENCOUNTER — OFFICE VISIT (OUTPATIENT)
Dept: WOUND CARE | Facility: CLINIC | Age: 73
End: 2023-12-13
Payer: MEDICARE

## 2023-12-13 PROCEDURE — 11042 DBRDMT SUBQ TIS 1ST 20SQCM/<: CPT

## 2023-12-14 ENCOUNTER — TELEPHONE (OUTPATIENT)
Dept: PRIMARY CARE | Facility: CLINIC | Age: 73
End: 2023-12-14

## 2023-12-14 ENCOUNTER — OFFICE VISIT (OUTPATIENT)
Dept: ENDOCRINOLOGY | Facility: CLINIC | Age: 73
End: 2023-12-14
Payer: MEDICARE

## 2023-12-14 VITALS
BODY MASS INDEX: 27.34 KG/M2 | HEART RATE: 76 BPM | HEIGHT: 68 IN | WEIGHT: 180.4 LBS | SYSTOLIC BLOOD PRESSURE: 118 MMHG | DIASTOLIC BLOOD PRESSURE: 62 MMHG

## 2023-12-14 DIAGNOSIS — E11.9 TYPE 2 DIABETES MELLITUS WITHOUT COMPLICATION, WITHOUT LONG-TERM CURRENT USE OF INSULIN (MULTI): Primary | ICD-10-CM

## 2023-12-14 DIAGNOSIS — I48.0 PAROXYSMAL ATRIAL FIBRILLATION (MULTI): ICD-10-CM

## 2023-12-14 LAB — POC HEMOGLOBIN A1C: 5.4 % (ref 4.2–6.5)

## 2023-12-14 PROCEDURE — 3044F HG A1C LEVEL LT 7.0%: CPT | Performed by: INTERNAL MEDICINE

## 2023-12-14 PROCEDURE — 1126F AMNT PAIN NOTED NONE PRSNT: CPT | Performed by: INTERNAL MEDICINE

## 2023-12-14 PROCEDURE — 99214 OFFICE O/P EST MOD 30 MIN: CPT | Performed by: INTERNAL MEDICINE

## 2023-12-14 PROCEDURE — 83036 HEMOGLOBIN GLYCOSYLATED A1C: CPT | Performed by: INTERNAL MEDICINE

## 2023-12-14 PROCEDURE — 1159F MED LIST DOCD IN RCRD: CPT | Performed by: INTERNAL MEDICINE

## 2023-12-14 PROCEDURE — 3074F SYST BP LT 130 MM HG: CPT | Performed by: INTERNAL MEDICINE

## 2023-12-14 PROCEDURE — 95251 CONT GLUC MNTR ANALYSIS I&R: CPT | Performed by: INTERNAL MEDICINE

## 2023-12-14 PROCEDURE — 1160F RVW MEDS BY RX/DR IN RCRD: CPT | Performed by: INTERNAL MEDICINE

## 2023-12-14 PROCEDURE — 3008F BODY MASS INDEX DOCD: CPT | Performed by: INTERNAL MEDICINE

## 2023-12-14 PROCEDURE — 3078F DIAST BP <80 MM HG: CPT | Performed by: INTERNAL MEDICINE

## 2023-12-14 PROCEDURE — 1036F TOBACCO NON-USER: CPT | Performed by: INTERNAL MEDICINE

## 2023-12-14 NOTE — PROGRESS NOTES
"Subjective   Charlene Spence is a 73 y.o. female who presents for follow-up of Type 2 diabetes mellitus.     She has had no major changes to her health since her last appointment.  She continues to work as a realtor.      Known complications due to diabetes included .    Cardiovascular risk factors include advanced age (older than 55 for men, 65 for women) and diabetes mellitus. The patient is not on an ACE inhibitor or angiotensin II receptor blocker.  The patient has not been previously hospitalized due to diabetic ketoacidosis.     Current symptoms/problems include none. Her clinical course has been stable.     The patient is currently checking the blood glucose multiple times per day.    Patient is using: continuous glucose monitor  [employer] CGM DATA:  Average 114mg/dL   0% of values below target range  99% of values within target range  1% of values above target range    Hypoglycemia frequency: 0%  Hypoglycemia awareness: N/A      Current Medication Regimen  Mounjaro 15mg SQ once weekly      MEALS: grazes;  Breakfast - protein drink   Lunch - half a salad  Dinner - chicken with gravy, mashed potatoes, noodles, vegetables   Snacks - lost of hard candy    Beverages - water, Glucerna     Denies exercise regimen      Objective   /62 (BP Location: Right arm, Patient Position: Sitting, BP Cuff Size: Adult)   Pulse 76   Ht 1.727 m (5' 8\")   Wt 81.8 kg (180 lb 6.4 oz)   BMI 27.43 kg/m²   Physical Exam  Vitals and nursing note reviewed.   Constitutional:       General: She is not in acute distress.     Appearance: Normal appearance. She is normal weight.   HENT:      Head: Normocephalic and atraumatic.      Nose: Nose normal.      Mouth/Throat:      Mouth: Mucous membranes are moist.   Eyes:      Extraocular Movements: Extraocular movements intact.   Cardiovascular:      Rate and Rhythm: Normal rate and regular rhythm.   Pulmonary:      Effort: Pulmonary effort is normal.      Breath sounds: Normal breath " "sounds.   Musculoskeletal:         General: Normal range of motion.   Skin:     General: Skin is warm.   Neurological:      Mental Status: She is alert and oriented to person, place, and time.   Psychiatric:         Mood and Affect: Mood normal.         Lab Review  Glucose (mg/dL)   Date Value   06/20/2023 97   06/05/2023 95   01/25/2023 111 (H)     POC HEMOGLOBIN A1c (%)   Date Value   12/14/2023 5.4   08/08/2023 5.2   05/10/2023 5.9     Hemoglobin A1C (%)   Date Value   06/20/2023 5.4     Bicarbonate (mmol/L)   Date Value   06/20/2023 28   06/05/2023 30   01/25/2023 29     Urea Nitrogen (mg/dL)   Date Value   06/20/2023 13   06/05/2023 9   01/25/2023 12     Creatinine (mg/dL)   Date Value   06/20/2023 0.89   06/05/2023 1.00   01/25/2023 1.00     Lab Results   Component Value Date    CHOL 204 (H) 06/20/2023    CHOL 153 12/07/2021    CHOL 150 07/23/2021     Lab Results   Component Value Date    HDL 44.7 06/20/2023    HDL 65.2 12/07/2021    HDL 57.9 07/23/2021     No results found for: \"LDLCALC\"  Lab Results   Component Value Date    TRIG 120 06/20/2023    TRIG 94 12/07/2021    TRIG 119 07/23/2021         Health Maintenance:   Foot Exam:  yesterday; goes every two weeks   Eye Exam:  November 2023  Urine Albumin:  June 20, 2023    Assessment/Plan   73 year old female presents for follow up for type II DM. Her last A1C was found to be 5.4% as of today. Her blood pressure is at goal.     Type 2 diabetes mellitus without complication, without long-term current use of insulin (CMS/[facility])  To continue Mounjaro 15mg/0.5 mL once weekly   To continue FreeStyle Nelson CGM  The A1C is at goal   Please stick to a low carb diet    For follow up in 6 months    "

## 2023-12-14 NOTE — PATIENT INSTRUCTIONS
Thank you for choosing [facility]  for your health care needs.  If you have any questions, concerns or medical needs, please feel free to contact our office at [phone].    Please ensure you complete your blood work one week before the next scheduled appointment.    To continue Mounjaro 15mg/0.5 mL once weekly   To continue FreeStyle Nelson CGM  For follow up in 6 months

## 2023-12-18 PROBLEM — E11.9 TYPE 2 DIABETES MELLITUS WITHOUT COMPLICATION, WITHOUT LONG-TERM CURRENT USE OF INSULIN (MULTI): Status: ACTIVE | Noted: 2023-01-20

## 2023-12-18 NOTE — ASSESSMENT & PLAN NOTE
To continue Mounjaro 15mg/0.5 mL once weekly   To continue FreeStyle Nelson CGM  The A1C is at goal   Please stick to a low carb diet    For follow up in 6 months

## 2024-01-05 DIAGNOSIS — E11.29 DIABETES MELLITUS WITH PROTEINURIA (MULTI): ICD-10-CM

## 2024-01-05 DIAGNOSIS — R80.9 DIABETES MELLITUS WITH PROTEINURIA (MULTI): ICD-10-CM

## 2024-01-05 RX ORDER — TIRZEPATIDE 15 MG/.5ML
15 INJECTION, SOLUTION SUBCUTANEOUS
Qty: 2 ML | Refills: 3 | Status: CANCELLED | OUTPATIENT
Start: 2024-01-05 | End: 2024-04-26

## 2024-01-05 RX ORDER — TIRZEPATIDE 15 MG/.5ML
15 INJECTION, SOLUTION SUBCUTANEOUS
Qty: 2 ML | Refills: 6 | Status: SHIPPED | OUTPATIENT
Start: 2024-01-05 | End: 2024-02-02 | Stop reason: SDUPTHER

## 2024-01-17 ENCOUNTER — APPOINTMENT (OUTPATIENT)
Dept: WOUND CARE | Facility: CLINIC | Age: 74
End: 2024-01-17
Payer: MEDICARE

## 2024-01-20 PROBLEM — N18.31 STAGE 3A CHRONIC KIDNEY DISEASE (MULTI): Status: ACTIVE | Noted: 2024-01-20

## 2024-02-02 ENCOUNTER — TELEPHONE (OUTPATIENT)
Dept: PRIMARY CARE | Facility: CLINIC | Age: 74
End: 2024-02-02
Payer: MEDICARE

## 2024-02-02 DIAGNOSIS — E11.29 DIABETES MELLITUS WITH PROTEINURIA (MULTI): ICD-10-CM

## 2024-02-02 DIAGNOSIS — R80.9 DIABETES MELLITUS WITH PROTEINURIA (MULTI): ICD-10-CM

## 2024-02-02 RX ORDER — FLASH GLUCOSE SENSOR
1 KIT MISCELLANEOUS
Qty: 6 EACH | Refills: 3 | Status: SHIPPED | OUTPATIENT
Start: 2024-02-02 | End: 2024-05-16 | Stop reason: SDUPTHER

## 2024-02-02 RX ORDER — TIRZEPATIDE 15 MG/.5ML
15 INJECTION, SOLUTION SUBCUTANEOUS
Qty: 6 ML | Refills: 2 | Status: SHIPPED | OUTPATIENT
Start: 2024-02-02 | End: 2024-03-06

## 2024-02-02 NOTE — TELEPHONE ENCOUNTER
Patient has changed to mail order and is requesting Mounjaro and Freestyle Nelson sensors to be sent on for 90 day supply.  Pended for approval for [facility] mail order pharmacy.

## 2024-02-05 DIAGNOSIS — I48.0 PAROXYSMAL ATRIAL FIBRILLATION (MULTI): ICD-10-CM

## 2024-02-13 DIAGNOSIS — I48.0 PAROXYSMAL ATRIAL FIBRILLATION (MULTI): ICD-10-CM

## 2024-02-13 NOTE — TELEPHONE ENCOUNTER
Received call from [facility] pharmacy to clarify dose of Eliquis 5 mg. Script was sent in on 2/5/24 for Eliquis 5 mg once daily, but wanted to make sure this was correct since BID medication. I let [facility] know we would correct and send in a new script.    Last Appointment: 2/13/23 with Dr. Maya   Next Appointment: 3/1/24 with Dr. Maya   Last Labs: CBC 6/5/23 WNL        Age: 73  Weight: 81.8 kg 12/14/23  Creatinine: 0.89 6/20/23

## 2024-03-05 DIAGNOSIS — R80.9 DIABETES MELLITUS WITH PROTEINURIA (MULTI): ICD-10-CM

## 2024-03-05 DIAGNOSIS — E11.29 DIABETES MELLITUS WITH PROTEINURIA (MULTI): ICD-10-CM

## 2024-03-06 RX ORDER — TIRZEPATIDE 15 MG/.5ML
INJECTION, SOLUTION SUBCUTANEOUS
Qty: 2 ML | Refills: 5 | Status: SHIPPED | OUTPATIENT
Start: 2024-03-06 | End: 2024-03-11

## 2024-03-11 DIAGNOSIS — E11.29 DIABETES MELLITUS WITH PROTEINURIA (MULTI): ICD-10-CM

## 2024-03-11 DIAGNOSIS — R80.9 DIABETES MELLITUS WITH PROTEINURIA (MULTI): ICD-10-CM

## 2024-03-11 RX ORDER — TIRZEPATIDE 15 MG/.5ML
INJECTION, SOLUTION SUBCUTANEOUS
Qty: 2 ML | Refills: 5 | Status: SHIPPED | OUTPATIENT
Start: 2024-03-11 | End: 2024-05-16 | Stop reason: SDUPTHER

## 2024-03-11 NOTE — TELEPHONE ENCOUNTER
Patient called to request pharmacy change for mounjaro. She was informed that mounjaro was already sent to [facility] as requested.

## 2024-04-04 ENCOUNTER — OFFICE VISIT (OUTPATIENT)
Dept: OBSTETRICS AND GYNECOLOGY | Facility: CLINIC | Age: 74
End: 2024-04-04
Payer: MEDICARE

## 2024-04-04 VITALS
HEIGHT: 68 IN | WEIGHT: 182 LBS | BODY MASS INDEX: 27.58 KG/M2 | SYSTOLIC BLOOD PRESSURE: 140 MMHG | DIASTOLIC BLOOD PRESSURE: 80 MMHG

## 2024-04-04 DIAGNOSIS — Z12.31 SCREENING MAMMOGRAM FOR BREAST CANCER: ICD-10-CM

## 2024-04-04 DIAGNOSIS — Z01.419 WOMEN'S ANNUAL ROUTINE GYNECOLOGICAL EXAMINATION: Primary | ICD-10-CM

## 2024-04-04 DIAGNOSIS — K59.09 CHRONIC CONSTIPATION: ICD-10-CM

## 2024-04-04 PROCEDURE — 3079F DIAST BP 80-89 MM HG: CPT | Performed by: OBSTETRICS & GYNECOLOGY

## 2024-04-04 PROCEDURE — 3077F SYST BP >= 140 MM HG: CPT | Performed by: OBSTETRICS & GYNECOLOGY

## 2024-04-04 PROCEDURE — 1036F TOBACCO NON-USER: CPT | Performed by: OBSTETRICS & GYNECOLOGY

## 2024-04-04 PROCEDURE — 99397 PER PM REEVAL EST PAT 65+ YR: CPT | Performed by: OBSTETRICS & GYNECOLOGY

## 2024-04-04 PROCEDURE — 1159F MED LIST DOCD IN RCRD: CPT | Performed by: OBSTETRICS & GYNECOLOGY

## 2024-04-04 PROCEDURE — 3008F BODY MASS INDEX DOCD: CPT | Performed by: OBSTETRICS & GYNECOLOGY

## 2024-04-04 NOTE — PROGRESS NOTES
"Kerry Spence \"Perry" is a 73 y.o. female who is here for a routine exam. PCP = Family Medicine, Physician, MD  Lost 65 lbs on Manjaro in one year  APE Concerns: chronic constipation    Other Concerns:   Preventative:  Last mammogram: 6/24 [unfilled] [unfilled]   Last Colonoscopy: 4 years ago   DEXA: 6/2023  Seat Belt Use: always    GynHx:  Postmenopausal symptoms: no       Social History     Substance and Sexual Activity   Sexual Activity Not Currently         SoHx:  Substance:   Tobacco Use: Low Risk  (4/4/2024)    Patient History     Smoking Tobacco Use: Never     Smokeless Tobacco Use: Never     Passive Exposure: Not on file      Social History     Substance and Sexual Activity   Drug Use Never      Social History     Substance and Sexual Activity   Alcohol Use Not Currently    Comment: Occasional       Past Medical History:   Diagnosis Date    Acute candidiasis of vulva and vagina 10/08/2015    Vaginal candidiasis    CVA (cerebral vascular accident) (CMS/[facility]) 01/20/2023    Generalized skin eruption due to drugs and medicaments taken internally 10/08/2015    Generalized skin eruption due to drugs and medicaments    Other conditions influencing health status 12/29/2020    History of cough    Other specified postprocedural states     History of removal of Port-a-Cath    Other specified soft tissue disorders 06/01/2015    Swelling of right upper extremity    Perforation of intestine (nontraumatic) (CMS/[facility]) 12/15/2014    Bowel perforation    Personal history of malignant neoplasm of breast 04/16/2021    History of malignant neoplasm of breast    Personal history of other diseases of the circulatory system 03/03/2014    History of hypertension    Personal history of other specified conditions 08/22/2014    History of insomnia      Past Surgical History:   Procedure Laterality Date    CHOLECYSTECTOMY  12/15/2014    Cholecystectomy    COLOSTOMY  12/15/2014    Colostomy    CT AORTA AND BILATERAL " "ILIOFEMORAL RUNOFF ANGIOGRAM W AND/OR WO IV CONTRAST  1/27/2023    CT AORTA AND BILATERAL ILIOFEMORAL RUNOFF ANGIOGRAM W AND/OR WO IV CONTRAST 1/27/2023 DOCTOR OFFICE LEGACY    MR HEAD ANGIO WO IV CONTRAST  10/11/2019    MR HEAD ANGIO WO IV CONTRAST 10/11/2019 [facility] CLINICAL LEGACY    MR NECK ANGIO WO IV CONTRAST  10/11/2019    MR NECK ANGIO WO IV CONTRAST 10/11/2019 [facility] CLINICAL LEGACY    OTHER SURGICAL HISTORY  12/30/2013    Modified Radical Mastectomy Right Breast    OTHER SURGICAL HISTORY  09/22/2017    Open Treatment Of Multiple Fractures Of The Radial Shaft    OTHER SURGICAL HISTORY  12/15/2014    Surgery Right Foot Amputation    TONSILLECTOMY  12/15/2014    Tonsillectomy      Objective   /80   Ht 1.727 m (5' 8\")   Wt 82.6 kg (182 lb)   BMI 27.67 kg/m²      General:   Alert and oriented, in no acute distress   Neck: Supple. No visible thyromegaly.    Breast/Axilla: Normal to palpation bilaterally without masses, skin changes, or nipple discharge.    Abdomen: Soft, non-tender, without masses or organomegaly   Vulva: Normal architecture without erythema, masses, or lesions.    Vagina: Normal mucosa without lesions, masses, or atrophy. No abnormal vaginal discharge.    Cervix: Normal without masses, lesions, or signs of cervicitis.    Uterus: Normal mobile, non-enlarged uterus    Adnexa: Normal without masses or lesions   Pelvic Floor No POP noted. No high tone pelvic floor    Psych Normal affect. Normal mood.      Problem List Items Addressed This Visit       Chronic constipation    Women's annual routine gynecological examination - Primary     Other Visit Diagnoses       Screening mammogram for breast cancer        Relevant Orders    BI mammo bilateral screening tomosynthesis           Assessment/Plan    Thank you for coming to your annual exam. Your findings during the exam were normal. Specific topics addressed during this exam included: healthy lifestyle, well woman screening guidelines,  Healthy " diet with high fiber, Weight bearing exercise 3 to 5 times a week, Multivitamins and calcium     Actions performed during this visit include:  - Clinical breast exam  - Clinical pelvic exam  - mammogram ordered  Orders Placed This Encounter   Procedures    BI mammo bilateral screening tomosynthesis     Standing Status:   Future     Standing Expiration Date:   5/27/2025     Order Specific Question:   Reason for exam:     Answer:   annual screening, history of right breast mastectomy     Order Specific Question:   Radiologist to Determine Optimal Study     Answer:   Yes     Order Specific Question:   Release result to [employer]     Answer:   Immediate [1]     Order Specific Question:   Is this exam part of a Research Study? If Yes, link this order to the research study     Answer:   No        Please return for your next visit in 1 year.

## 2024-04-10 ENCOUNTER — OFFICE VISIT (OUTPATIENT)
Dept: PRIMARY CARE | Facility: CLINIC | Age: 74
End: 2024-04-10
Payer: MEDICARE

## 2024-04-10 VITALS
DIASTOLIC BLOOD PRESSURE: 70 MMHG | HEART RATE: 77 BPM | HEIGHT: 68 IN | RESPIRATION RATE: 16 BRPM | BODY MASS INDEX: 28.01 KG/M2 | OXYGEN SATURATION: 97 % | WEIGHT: 184.8 LBS | SYSTOLIC BLOOD PRESSURE: 134 MMHG

## 2024-04-10 DIAGNOSIS — N18.31 STAGE 3A CHRONIC KIDNEY DISEASE (MULTI): ICD-10-CM

## 2024-04-10 DIAGNOSIS — Z98.61 S/P PTCA (PERCUTANEOUS TRANSLUMINAL CORONARY ANGIOPLASTY): ICD-10-CM

## 2024-04-10 DIAGNOSIS — Z79.899 MEDICATION MANAGEMENT: ICD-10-CM

## 2024-04-10 DIAGNOSIS — L97.511 ULCER OF RIGHT FOOT, LIMITED TO BREAKDOWN OF SKIN (MULTI): ICD-10-CM

## 2024-04-10 DIAGNOSIS — I48.0 PAROXYSMAL ATRIAL FIBRILLATION (MULTI): ICD-10-CM

## 2024-04-10 DIAGNOSIS — E11.29 DIABETES MELLITUS WITH PROTEINURIA (MULTI): ICD-10-CM

## 2024-04-10 DIAGNOSIS — E78.2 MIXED HYPERLIPIDEMIA: ICD-10-CM

## 2024-04-10 DIAGNOSIS — G62.9 PERIPHERAL POLYNEUROPATHY: ICD-10-CM

## 2024-04-10 DIAGNOSIS — R80.9 DIABETES MELLITUS WITH PROTEINURIA (MULTI): ICD-10-CM

## 2024-04-10 DIAGNOSIS — I50.812 CHRONIC RIGHT-SIDED CONGESTIVE HEART FAILURE (MULTI): ICD-10-CM

## 2024-04-10 DIAGNOSIS — I25.10 CAD IN NATIVE ARTERY: ICD-10-CM

## 2024-04-10 DIAGNOSIS — Z00.00 ROUTINE GENERAL MEDICAL EXAMINATION AT HEALTH CARE FACILITY: Primary | ICD-10-CM

## 2024-04-10 DIAGNOSIS — E11.9 TYPE 2 DIABETES MELLITUS WITHOUT COMPLICATION, WITHOUT LONG-TERM CURRENT USE OF INSULIN (MULTI): ICD-10-CM

## 2024-04-10 DIAGNOSIS — C50.919 MALIGNANT NEOPLASM OF FEMALE BREAST, UNSPECIFIED ESTROGEN RECEPTOR STATUS, UNSPECIFIED LATERALITY, UNSPECIFIED SITE OF BREAST (MULTI): ICD-10-CM

## 2024-04-10 PROCEDURE — 3078F DIAST BP <80 MM HG: CPT | Performed by: FAMILY MEDICINE

## 2024-04-10 PROCEDURE — 80354 DRUG SCREENING FENTANYL: CPT

## 2024-04-10 PROCEDURE — 1159F MED LIST DOCD IN RCRD: CPT | Performed by: FAMILY MEDICINE

## 2024-04-10 PROCEDURE — 82570 ASSAY OF URINE CREATININE: CPT

## 2024-04-10 PROCEDURE — 82043 UR ALBUMIN QUANTITATIVE: CPT

## 2024-04-10 PROCEDURE — 80346 BENZODIAZEPINES1-12: CPT

## 2024-04-10 PROCEDURE — 1036F TOBACCO NON-USER: CPT | Performed by: FAMILY MEDICINE

## 2024-04-10 PROCEDURE — 80373 DRUG SCREENING TRAMADOL: CPT

## 2024-04-10 PROCEDURE — 99214 OFFICE O/P EST MOD 30 MIN: CPT | Performed by: FAMILY MEDICINE

## 2024-04-10 PROCEDURE — G0439 PPPS, SUBSEQ VISIT: HCPCS | Performed by: FAMILY MEDICINE

## 2024-04-10 PROCEDURE — 3075F SYST BP GE 130 - 139MM HG: CPT | Performed by: FAMILY MEDICINE

## 2024-04-10 PROCEDURE — 80307 DRUG TEST PRSMV CHEM ANLYZR: CPT

## 2024-04-10 PROCEDURE — 80361 OPIATES 1 OR MORE: CPT

## 2024-04-10 PROCEDURE — 3008F BODY MASS INDEX DOCD: CPT | Performed by: FAMILY MEDICINE

## 2024-04-10 PROCEDURE — 80358 DRUG SCREENING METHADONE: CPT

## 2024-04-10 PROCEDURE — 1160F RVW MEDS BY RX/DR IN RCRD: CPT | Performed by: FAMILY MEDICINE

## 2024-04-10 PROCEDURE — 1170F FXNL STATUS ASSESSED: CPT | Performed by: FAMILY MEDICINE

## 2024-04-10 PROCEDURE — 80368 SEDATIVE HYPNOTICS: CPT

## 2024-04-10 PROCEDURE — 80365 DRUG SCREENING OXYCODONE: CPT

## 2024-04-10 RX ORDER — HYDROCODONE BITARTRATE AND ACETAMINOPHEN 5; 325 MG/1; MG/1
1 TABLET ORAL EVERY 8 HOURS PRN
Qty: 90 TABLET | Refills: 0 | Status: SHIPPED | OUTPATIENT
Start: 2024-04-10 | End: 2024-05-16

## 2024-04-10 RX ORDER — GABAPENTIN 300 MG/1
300 CAPSULE ORAL EVERY 4 HOURS
Qty: 540 CAPSULE | Refills: 1 | Status: SHIPPED | OUTPATIENT
Start: 2024-04-10 | End: 2025-04-10

## 2024-04-10 ASSESSMENT — ACTIVITIES OF DAILY LIVING (ADL)
BATHING: INDEPENDENT
TAKING_MEDICATION: INDEPENDENT
DOING_HOUSEWORK: INDEPENDENT
GROCERY_SHOPPING: INDEPENDENT
MANAGING_FINANCES: INDEPENDENT
DRESSING: INDEPENDENT

## 2024-04-10 ASSESSMENT — ENCOUNTER SYMPTOMS
DEPRESSION: 0
ARTHRALGIAS: 1
UNEXPECTED WEIGHT CHANGE: 1
LOSS OF SENSATION IN FEET: 0
WOUND: 1
OCCASIONAL FEELINGS OF UNSTEADINESS: 0

## 2024-04-10 ASSESSMENT — COLUMBIA-SUICIDE SEVERITY RATING SCALE - C-SSRS
1. IN THE PAST MONTH, HAVE YOU WISHED YOU WERE DEAD OR WISHED YOU COULD GO TO SLEEP AND NOT WAKE UP?: NO
6. HAVE YOU EVER DONE ANYTHING, STARTED TO DO ANYTHING, OR PREPARED TO DO ANYTHING TO END YOUR LIFE?: NO
2. HAVE YOU ACTUALLY HAD ANY THOUGHTS OF KILLING YOURSELF?: NO

## 2024-04-10 ASSESSMENT — PATIENT HEALTH QUESTIONNAIRE - PHQ9
1. LITTLE INTEREST OR PLEASURE IN DOING THINGS: NOT AT ALL
SUM OF ALL RESPONSES TO PHQ9 QUESTIONS 1 AND 2: 0
2. FEELING DOWN, DEPRESSED OR HOPELESS: NOT AT ALL

## 2024-04-10 NOTE — PATIENT INSTRUCTIONS
Problem List Items Addressed This Visit       CAD in native artery    Relevant Orders    CBC and Auto Differential    Comprehensive Metabolic Panel    CHF (congestive heart failure) (CMS/[facility])    Type 2 diabetes mellitus without complication, without long-term current use of insulin (CMS/[facility])    Relevant Orders    Hemoglobin A1C    Hyperlipidemia    Relevant Orders    Lipid Panel    Paroxysmal atrial fibrillation (CMS/[facility])    Relevant Orders    Comprehensive Metabolic Panel    Peripheral neuropathy    Relevant Medications    gabapentin (Neurontin) 300 mg capsule    HYDROcodone-acetaminophen (Norco) 5-325 mg tablet    Ulcer of right foot, limited to breakdown of skin (CMS/[facility])    S/P PTCA (percutaneous transluminal coronary angioplasty)    Stage 3a chronic kidney disease (CMS/[facility])    Relevant Orders    Comprehensive Metabolic Panel     Other Visit Diagnoses       Routine general medical examination at health care facility    -  Primary    Diabetes mellitus with proteinuria (CMS/[facility])        Relevant Orders    CBC and Auto Differential    Comprehensive Metabolic Panel    Lipid Panel    TSH with reflex to Free T4 if abnormal    Hemoglobin A1C    Albumin , Urine Random    Medication management        Relevant Orders    Opiate/Opioid/Benzo Prescription Compliance

## 2024-04-10 NOTE — PROGRESS NOTES
Subjective   Reason for Visit: Kerry Spence is an 73 y.o. female here for a Medicare Wellness visit.         HPI  73 year old female for follow up. She is frustrated with her mounjaro, as it is unavailable. She is gaining weight 14 pounds. Appetite is large.   OARRS:  Anca Jerry MD on 4/10/2024  4:53 PM  I have personally reviewed the OARRS report for Kerry Spence. I have considered the risks of abuse, dependence, addiction and diversion and I believe that it is clinically appropriate for Kerry Spence to be prescribed this medication    Is the patient prescribed a combination of a benzodiazepine and opioid?  NO    Last Urine Drug Screen / ordered today: Yes  No results found for this or any previous visit (from the past 8760 hour(s)).  Results are as expected.     Clinical rationale for not completing a Urine Drug Screen: n/a      Controlled Substance Agreement:  Date of the Last Agreement: 3/2023  Reviewed Controlled Substance Agreement including but not limited to the benefits, risks, and alternatives to treatment with a Controlled Substance medication(s).    Opioids:  What is the patient's goal of therapy? Improved pain  Is this being achieved with current treatment? yes    I have calculated the patient's Morphine Dose Equivalent (MED):   I have considered referral to Pain Management and/or a specialist, and do not feel it is necessary at this time.    I feel that it is clinically indicated to continue this current medication regimen after consideration of alternative therapies, and other non-opioid treatment.    Opioid Risk Screening:  No data recorded    Pain Assessment:  4/10  Patient Care Team:  Anca Jerry MD as PCP - [employer]vantage PCP  Anca Jerry MD as PCP - [employer] Medicare Advantage PCP     Review of Systems   Constitutional:  Positive for unexpected weight change.   Musculoskeletal:  Positive for arthralgias.   Skin:  Positive for wound.   All other systems  "reviewed and are negative.      Objective   Vitals:  /70   Pulse 77   Resp 16   Ht 1.727 m (5' 8\")   Wt 83.8 kg (184 lb 12.8 oz)   SpO2 97%   BMI 28.10 kg/m²       Physical Exam  Vitals reviewed.   Constitutional:       Appearance: Normal appearance.   HENT:      Head: Normocephalic and atraumatic.      Right Ear: Tympanic membrane normal.      Left Ear: Tympanic membrane normal.      Nose: Nose normal.      Mouth/Throat:      Mouth: Mucous membranes are moist.      Pharynx: Oropharynx is clear.   Eyes:      Extraocular Movements: Extraocular movements intact.      Conjunctiva/sclera: Conjunctivae normal.      Pupils: Pupils are equal, round, and reactive to light.   Cardiovascular:      Rate and Rhythm: Normal rate and regular rhythm.      Pulses: Normal pulses.      Heart sounds: Normal heart sounds.   Pulmonary:      Effort: Pulmonary effort is normal.      Breath sounds: Normal breath sounds.   Abdominal:      General: Abdomen is flat. Bowel sounds are normal.      Palpations: Abdomen is soft.   Musculoskeletal:         General: Normal range of motion.      Cervical back: Normal range of motion and neck supple.   Skin:     General: Skin is warm and dry.      Capillary Refill: Capillary refill takes less than 2 seconds.   Neurological:      General: No focal deficit present.      Mental Status: She is alert and oriented to person, place, and time.   Psychiatric:         Mood and Affect: Mood normal.         Behavior: Behavior normal.         Assessment/Plan   Problem List Items Addressed This Visit       CAD in native artery    Relevant Orders    CBC and Auto Differential    Comprehensive Metabolic Panel    CHF (congestive heart failure) (CMS/[facility])    Type 2 diabetes mellitus without complication, without long-term current use of insulin (CMS/[facility])    Relevant Orders    Hemoglobin A1C    Hyperlipidemia    Relevant Orders    Lipid Panel    Paroxysmal atrial fibrillation (CMS/[facility])    Relevant Orders    " Comprehensive Metabolic Panel    Peripheral neuropathy    Relevant Medications    gabapentin (Neurontin) 300 mg capsule    HYDROcodone-acetaminophen (Norco) 5-325 mg tablet    Other Relevant Orders    Follow Up In [facility] - PCP - Established    Ulcer of right foot, limited to breakdown of skin (CMS/[facility])    S/P PTCA (percutaneous transluminal coronary angioplasty)    Stage 3a chronic kidney disease (CMS/[facility])    Relevant Orders    Comprehensive Metabolic Panel     Other Visit Diagnoses       Routine general medical examination at health care facility    -  Primary    Diabetes mellitus with proteinuria (CMS/[facility])        Relevant Orders    CBC and Auto Differential    Comprehensive Metabolic Panel    Lipid Panel    TSH with reflex to Free T4 if abnormal    Hemoglobin A1C    Albumin , Urine Random    Medication management        Relevant Orders    Opiate/Opioid/Benzo Prescription Compliance

## 2024-04-11 LAB
AMPHETAMINES UR QL SCN: NORMAL
BARBITURATES UR QL SCN: NORMAL
BZE UR QL SCN: NORMAL
CANNABINOIDS UR QL SCN: NORMAL
CREAT UR-MCNC: 60.7 MG/DL (ref 20–320)
CREAT UR-MCNC: 61.5 MG/DL (ref 20–320)
MICROALBUMIN UR-MCNC: 8.8 MG/L
MICROALBUMIN/CREAT UR: 14.3 UG/MG CREAT
PCP UR QL SCN: NORMAL

## 2024-05-15 NOTE — PROGRESS NOTES
"Subjective   Charlene Spence is a 73 y.o. female who presents for follow-up of Type 2 diabetes mellitus.     She has had no major changes to her health since her last appointment.  She continues to work as a realtor.      She has bandages to right great and fifth toe.  Right foot tends to externally roates.  She saw a podiatrist who recommended ankle fusion but she is opposed to this.  Right great toe has been amputated.  She cannot afford weekly wound center visists.      She si going to [place] and [place] for [employer].      Known complications due to diabetes included right great toe amputaiton.      Cardiovascular risk factors include advanced age (older than 55 for men, 65 for women) and diabetes mellitus. The patient is not on an ACE inhibitor or angiotensin II receptor blocker.  The patient has not been previously hospitalized due to diabetic ketoacidosis.     Current symptoms/problems include none. Her clinical course has been stable.     The patient is currently checking the blood glucose multiple times per day.    Patient is using: continuous glucose monitor                                                          AHypoglycemia frequency: 0%  Hypoglycemia awareness: N/A      Current Medication Regimen  Mounjaro 15mg SQ once weekly --> none for one month  She has gained 15 pounds as she was craving food all the time.       MEALS: grazes;  Breakfast - protein drink   Lunch - half a salad  Dinner - chicken with gravy, mashed potatoes, noodles, vegetables   Snacks - lost of hard candy    Beverages - water, Glucerna     Denies exercise regimen    Review of Systems   Constitutional:  Positive for unexpected weight change (Weight gain).   All other systems reviewed and are negative.    Objective   /68 (BP Location: Left arm, Patient Position: Sitting, BP Cuff Size: Adult)   Pulse 69   Ht 1.727 m (5' 8\")   Wt 85.3 kg (188 lb)   BMI 28.59 kg/m²   Physical Exam  Vitals and nursing note reviewed. "   Constitutional:       General: She is not in acute distress.     Appearance: Normal appearance. She is normal weight.   HENT:      Head: Normocephalic and atraumatic.      Nose: Nose normal.      Mouth/Throat:      Mouth: Mucous membranes are moist.   Eyes:      Extraocular Movements: Extraocular movements intact.   Cardiovascular:      Rate and Rhythm: Normal rate and regular rhythm.   Pulmonary:      Effort: Pulmonary effort is normal.      Breath sounds: Normal breath sounds.   Musculoskeletal:         General: Normal range of motion.      Right foot: Normal range of motion. No deformity.      Left foot: Normal range of motion. No deformity.   Feet:      Right foot:      Skin integrity: Skin integrity normal. No ulcer or blister.      Toenail Condition: Right toenails are normal.      Left foot:      Skin integrity: Skin integrity normal. No ulcer or blister.      Toenail Condition: Left toenails are normal.      Comments: Amputated right great toe  Blood stained bandage to right foot   Skin:     General: Skin is warm.   Neurological:      Mental Status: She is alert and oriented to person, place, and time.   Psychiatric:         Mood and Affect: Mood normal.         Lab Review  Glucose (mg/dL)   Date Value   06/20/2023 97   06/05/2023 95   01/25/2023 111 (H)     POC HEMOGLOBIN A1c (%)   Date Value   12/14/2023 5.4   08/08/2023 5.2   05/10/2023 5.9     Hemoglobin A1C (%)   Date Value   06/20/2023 5.4     Bicarbonate (mmol/L)   Date Value   06/20/2023 28   06/05/2023 30   01/25/2023 29     Urea Nitrogen (mg/dL)   Date Value   06/20/2023 13   06/05/2023 9   01/25/2023 12     Creatinine (mg/dL)   Date Value   06/20/2023 0.89   06/05/2023 1.00   01/25/2023 1.00     Lab Results   Component Value Date    CHOL 204 (H) 06/20/2023    CHOL 153 12/07/2021    CHOL 150 07/23/2021     Lab Results   Component Value Date    HDL 44.7 06/20/2023    HDL 65.2 12/07/2021    HDL 57.9 07/23/2021     No results found for:  "\"LDLCALC\"  Lab Results   Component Value Date    TRIG 120 06/20/2023    TRIG 94 12/07/2021    TRIG 119 07/23/2021         Health Maintenance:   Foot Exam:  May 16, 2024  Eye Exam:  November 2023  Urine Albumin:  June 20, 2023    CGM Interpretation/Plan   14 day CGM download was reviewed in detail as documented above and will be attached to chart.  A minimum of 72 hours of glucose data was used to inform the management plan outlined below.  96 % of values is within target range.      Assessment/Plan   73 year old female presents for follow up for type II DM. Her blood pressure is at goal.     Type 2 diabetes mellitus without complication, without long-term current use of insulin (Multi)  To continue Mounjaro 15mg/0.5 mL once weekly   There is a national shortage of the above and thus its use may be disrupted   Counseled that compounded Mounjaro is not FDA approved but is marketed to be made in an FDA approved facility   To continue FreeStyle [employer] CGM  Counseled that the time in range should be >70%, and thus you are at goal   Please keep feet moisturized and inspect daily  To obtain blood tests as ordered by PCP   For follow up in 6 months    "

## 2024-05-15 NOTE — PATIENT INSTRUCTIONS
Thank you for choosing [facility]  for your health care needs.  If you have any questions, concerns or medical needs, please feel free to contact our office at [phone].    Please ensure you complete your blood work one week before the next scheduled appointment.    To continue Mounjaro 15mg/0.5 mL once weekly   There is a national shortage of the above and thus its use may be disrupted   To continue FreeStyle Nelson CGM  Please keep feet moisturized and inspect daily  For follow up in 6 months

## 2024-05-16 ENCOUNTER — OFFICE VISIT (OUTPATIENT)
Dept: ENDOCRINOLOGY | Facility: CLINIC | Age: 74
End: 2024-05-16
Payer: MEDICARE

## 2024-05-16 VITALS
BODY MASS INDEX: 28.49 KG/M2 | DIASTOLIC BLOOD PRESSURE: 68 MMHG | HEART RATE: 69 BPM | WEIGHT: 188 LBS | HEIGHT: 68 IN | SYSTOLIC BLOOD PRESSURE: 116 MMHG

## 2024-05-16 DIAGNOSIS — E11.9 TYPE 2 DIABETES MELLITUS WITHOUT COMPLICATION, WITHOUT LONG-TERM CURRENT USE OF INSULIN (MULTI): Primary | ICD-10-CM

## 2024-05-16 DIAGNOSIS — E11.29 DIABETES MELLITUS WITH PROTEINURIA (MULTI): ICD-10-CM

## 2024-05-16 DIAGNOSIS — R80.9 DIABETES MELLITUS WITH PROTEINURIA (MULTI): ICD-10-CM

## 2024-05-16 PROCEDURE — 1159F MED LIST DOCD IN RCRD: CPT | Performed by: INTERNAL MEDICINE

## 2024-05-16 PROCEDURE — 3061F NEG MICROALBUMINURIA REV: CPT | Performed by: INTERNAL MEDICINE

## 2024-05-16 PROCEDURE — 95251 CONT GLUC MNTR ANALYSIS I&R: CPT | Performed by: INTERNAL MEDICINE

## 2024-05-16 PROCEDURE — 3074F SYST BP LT 130 MM HG: CPT | Performed by: INTERNAL MEDICINE

## 2024-05-16 PROCEDURE — 3078F DIAST BP <80 MM HG: CPT | Performed by: INTERNAL MEDICINE

## 2024-05-16 PROCEDURE — 3008F BODY MASS INDEX DOCD: CPT | Performed by: INTERNAL MEDICINE

## 2024-05-16 PROCEDURE — 1160F RVW MEDS BY RX/DR IN RCRD: CPT | Performed by: INTERNAL MEDICINE

## 2024-05-16 PROCEDURE — 99214 OFFICE O/P EST MOD 30 MIN: CPT | Performed by: INTERNAL MEDICINE

## 2024-05-16 RX ORDER — TIRZEPATIDE 15 MG/.5ML
15 INJECTION, SOLUTION SUBCUTANEOUS
Qty: 2 ML | Refills: 5 | Status: SHIPPED | OUTPATIENT
Start: 2024-05-19 | End: 2024-05-22 | Stop reason: SDUPTHER

## 2024-05-16 RX ORDER — FLASH GLUCOSE SENSOR
1 KIT MISCELLANEOUS
Qty: 6 EACH | Refills: 3 | Status: SHIPPED | OUTPATIENT
Start: 2024-05-16 | End: 2025-05-16

## 2024-05-16 ASSESSMENT — ENCOUNTER SYMPTOMS: UNEXPECTED WEIGHT CHANGE: 1

## 2024-05-20 NOTE — ASSESSMENT & PLAN NOTE
To continue Mounjaro 15mg/0.5 mL once weekly   There is a national shortage of the above and thus its use may be disrupted   Counseled that compounded Mounjaro is not FDA approved but is marketed to be made in an FDA approved facility   To continue FreeStyle [employer] CGM  Counseled that the time in range should be >70%, and thus you are at goal   Please keep feet moisturized and inspect daily  To obtain blood tests as ordered by PCP   For follow up in 6 months

## 2024-05-22 ENCOUNTER — APPOINTMENT (OUTPATIENT)
Dept: WOUND CARE | Facility: CLINIC | Age: 74
End: 2024-05-22
Payer: MEDICARE

## 2024-05-22 DIAGNOSIS — E11.29 DIABETES MELLITUS WITH PROTEINURIA (MULTI): ICD-10-CM

## 2024-05-22 DIAGNOSIS — R80.9 DIABETES MELLITUS WITH PROTEINURIA (MULTI): ICD-10-CM

## 2024-05-22 RX ORDER — TIRZEPATIDE 15 MG/.5ML
15 INJECTION, SOLUTION SUBCUTANEOUS
Qty: 2 ML | Refills: 5 | Status: SHIPPED | OUTPATIENT
Start: 2024-05-26 | End: 2024-06-03

## 2024-05-23 ENCOUNTER — TELEPHONE (OUTPATIENT)
Dept: PRIMARY CARE | Facility: CLINIC | Age: 74
End: 2024-05-23
Payer: MEDICARE

## 2024-05-23 ENCOUNTER — TELEPHONE (OUTPATIENT)
Dept: ENDOCRINOLOGY | Facility: CLINIC | Age: 74
End: 2024-05-23
Payer: MEDICARE

## 2024-05-23 RX ORDER — TIRZEPATIDE 12.5 MG/.5ML
12.5 INJECTION, SOLUTION SUBCUTANEOUS
Qty: 2 ML | Refills: 3 | Status: SHIPPED | OUTPATIENT
Start: 2024-05-23 | End: 2024-08-21

## 2024-05-23 NOTE — TELEPHONE ENCOUNTER
Patient left voicemail 5/23/24 requesting rx for mounjaro 12.5mg to be sent to [facility] asap because it was only one left. Patient has been contacted because an rx for 15mg was just sent to another pharmacy 5/22/24.

## 2024-05-23 NOTE — TELEPHONE ENCOUNTER
Patient is unable to find Mounjaro and is wanting to know if she would be able to start Qsymig. [facility]/[place]

## 2024-05-23 NOTE — TELEPHONE ENCOUNTER
Patient is asking for Mounjaro 12.5 to be sent to [facility] pharmacy,  she is asking that you please send it today because it's first come, first serve.

## 2024-05-31 DIAGNOSIS — C50.919 MALIGNANT NEOPLASM OF FEMALE BREAST, UNSPECIFIED ESTROGEN RECEPTOR STATUS, UNSPECIFIED LATERALITY, UNSPECIFIED SITE OF BREAST (MULTI): Primary | ICD-10-CM

## 2024-06-01 DIAGNOSIS — E11.29 DIABETES MELLITUS WITH PROTEINURIA (MULTI): ICD-10-CM

## 2024-06-01 DIAGNOSIS — R80.9 DIABETES MELLITUS WITH PROTEINURIA (MULTI): ICD-10-CM

## 2024-06-03 ENCOUNTER — APPOINTMENT (OUTPATIENT)
Dept: HEMATOLOGY/ONCOLOGY | Facility: CLINIC | Age: 74
End: 2024-06-03
Payer: MEDICARE

## 2024-06-03 DIAGNOSIS — C50.919 MALIGNANT NEOPLASM OF FEMALE BREAST, UNSPECIFIED ESTROGEN RECEPTOR STATUS, UNSPECIFIED LATERALITY, UNSPECIFIED SITE OF BREAST (MULTI): Primary | ICD-10-CM

## 2024-06-03 RX ORDER — TIRZEPATIDE 15 MG/.5ML
INJECTION, SOLUTION SUBCUTANEOUS
Qty: 2 ML | Refills: 5 | Status: SHIPPED | OUTPATIENT
Start: 2024-06-03

## 2024-06-05 PROBLEM — N39.0 URINARY TRACT INFECTION WITHOUT HEMATURIA: Status: ACTIVE | Noted: 2024-06-05

## 2024-06-05 PROBLEM — N89.8 VAGINAL DISCHARGE: Status: ACTIVE | Noted: 2024-06-05

## 2024-06-05 PROBLEM — J30.1 ALLERGIC RHINITIS DUE TO POLLEN: Status: ACTIVE | Noted: 2024-06-05

## 2024-06-05 PROBLEM — S61.409A OPEN WOUND, HAND: Status: ACTIVE | Noted: 2024-06-05

## 2024-06-20 NOTE — PROGRESS NOTES
Counseling:  The patient was counseled regarding diagnostic results, instructions for management, risk factor reductions, prognosis, patient and family education, impressions, risks and benefits of treatment options and importance of compliance with treatment.      Chief Complaint:   The patient presents today for annual followup of CAD, a-fib and dyslipidemia.      History Of Present Illness:    Kerry Spence is a 73 year old female patient who presents today for annual followup of CAD, a-fib and dyslipidemia. Her PMH is significant for an abnormal heart CT score, obesity, CAD s/p PCI of LAD and Cx, h/o COVID-19 infection 04/16/2021, CVA, DM type 2, a-fib, hyperlipidemia, paroxysmal atrial fibrillation, peripheral neuropathy and shingles. Over the past year, the patient states that she has done well from a cardiac standpoint. She denies any CP, chest discomfort, SOB or palpitations. Her only complaint is that of easy bruising and bleeding. She has lost 65 pounds with the assistance of Mounjaro. BP has been stable. EKG today shows NSR with no acute changes. The patient is compliant with her prescribed medications.      Last Recorded Vitals:  Vitals:    06/21/24 1507   BP: 124/64   Pulse: 64   Weight: 85.3 kg (188 lb)       Past Surgical History:  She has a past surgical history that includes Other surgical history (12/30/2013); Other surgical history (09/22/2017); Tonsillectomy (12/15/2014); Colostomy (12/15/2014); Cholecystectomy (12/15/2014); Other surgical history (12/15/2014); MR angio neck wo IV contrast (10/11/2019); MR angio head wo IV contrast (10/11/2019); and CT angio aorta and bilateral iliofemoral runoff w and or wo IV contrast (1/27/2023).      Social History:  She reports that she has never smoked. She has never used smokeless tobacco. She reports that she does not currently use alcohol. She reports that she does not use drugs.    Family History:  Family History   Problem Relation Name Age of Onset     COPD Mother      Aneurysm Father          AAA    Coronary artery disease Father      Hyperlipidemia Other Sibling         Allergies:  Sulfa (sulfonamide antibiotics), Levofloxacin, Lisinopril, Lorazepam, Oxycodone-acetaminophen, Prochlorperazine, Vancomycin, and Codeine    Outpatient Medications:  Current Outpatient Medications   Medication Instructions    apixaban (ELIQUIS) 5 mg, oral, 2 times daily    flash glucose sensor kit (FreeStyle Nelson 2 Sensor) kit 1 each, subcutaneous, Every 14 days    FreeStyle Nelson reader (FreeStyle Nelson 2 [place]) misc subcutaneous, 4 times daily, Use as instructed. Check blood sugars    gabapentin (NEURONTIN) 300 mg, oral, Every 4 hours    Mounjaro 15 mg/0.5 mL pen injector inject 15 milligram weekly    Mounjaro 12.5 mg, subcutaneous, Every 7 days    naloxone (Narcan) 4 mg/0.1 mL nasal spray nasal, Daily PRN    pseudoephedrine ER (SUDAFED 12 HOUR) 120 mg, oral, Every 12 hours PRN     Review of Systems   Hematologic/Lymphatic: Bruises/bleeds easily.   All other systems reviewed and are negative.     Physical Exam:  Constitutional:       Appearance: Healthy appearance. Not in distress.   Neck:      Vascular: No JVR. JVD normal.   Pulmonary:      Effort: Pulmonary effort is normal.      Breath sounds: Normal breath sounds. No wheezing. No rhonchi. No rales.   Chest:      Chest wall: Not tender to palpatation.   Cardiovascular:      PMI at left midclavicular line. Normal rate. Regular rhythm. Normal S1. Normal S2.       Murmurs: There is no murmur.      No gallop.  No click. No rub.   Pulses:     Intact distal pulses.   Edema:     Peripheral edema absent.   Abdominal:      General: Bowel sounds are normal.      Palpations: Abdomen is soft.      Tenderness: There is no abdominal tenderness.   Musculoskeletal: Normal range of motion.         General: No tenderness. Skin:     General: Skin is warm and dry.   Neurological:      General: No focal deficit present.      Mental Status: Alert and  oriented to person, place and time.          Last Labs:  CBC -  Lab Results   Component Value Date    WBC 6.5 06/21/2024    HGB 12.6 06/21/2024    HCT 37.4 06/21/2024    MCV 87 06/21/2024     06/21/2024       CMP -  Lab Results   Component Value Date    CALCIUM 9.2 06/21/2024    PROT 6.4 06/21/2024    ALBUMIN 4.0 06/21/2024    AST 19 06/21/2024    ALT 13 06/21/2024    ALKPHOS 97 06/21/2024    BILITOT 0.5 06/21/2024       LIPID PANEL -   Lab Results   Component Value Date    CHOL 220 (H) 06/21/2024    TRIG 121 06/21/2024    HDL 47.4 06/21/2024    CHHDL 4.6 06/21/2024    LDLF 135 (H) 06/20/2023    VLDL 24 06/21/2024    NHDL 173 (H) 06/21/2024       RENAL FUNCTION PANEL -   Lab Results   Component Value Date    GLUCOSE 95 06/21/2024     06/21/2024    K 4.4 06/21/2024     06/21/2024    CO2 28 06/21/2024    ANIONGAP 10 06/21/2024    BUN 12 06/21/2024    CREATININE 0.88 06/21/2024    CALCIUM 9.2 06/21/2024    ALBUMIN 4.0 06/21/2024        Lab Results   Component Value Date     (H) 05/22/2022    HGBA1C 5.8 (H) 06/21/2024    HGBA1C 5.4 12/14/2023       Last Cardiology Tests:  01/27/2023 - CTA Abdominal Aorta  1. Occlusion of the distal 1/3 of the right anterior tibial artery and the distal 2/3 of the right peroneal artery. The right posterior tibial artery and plantar artery are patent.  2. There is a three-vessel runoff to the left ankle without hemodynamically significant stenosis.  3. Additional stable chronic findings as above.     09/12/2022 - TTE  1. Left ventricular systolic function is moderately decreased with a 35-40% estimated ejection fraction.  2. There is global hypokinesis of the left ventricle with minor regional variations.     08/20/2021 - Vascular Lab Carotid Artery Duplex U/S   1. Right Carotid: Findings are consistent with less than 50% stenosis of the right proximal ICA. Laminar flow seen by color Doppler. Right external carotid artery appears patent with no evidence of  stenosis. No evidence of hemodynamically significant stenosis of the right common carotid artery. The right vertebral artery is patent with antegrade flow. No evidence of hemodynamically significant stenosis in the right subclavian.  2. Left Carotid: Findings are consistent with less than 50% stenosis of the left proximal ICA. Laminar flow seen by color Doppler. Left external carotid artery appears patent with no evidence of stenosis. No evidence of hemodynamically significant stenosis of the left common carotid artery. The left vertebral artery is patent with antegrade flow. No evidence of hemodynamically significant stenosis in the left subclavian.     05/20/2021 - KELSY  The left ventricular systolic function is normal with a 55-60% estimated ejection fraction.     05/11/2021 - TTE  1. The left ventricular systolic function is mildly to moderately decreased with a 40-45% estimated ejection fraction.  2. Spectral Doppler shows an impaired relaxation pattern of left ventricular diastolic filling.  3. Echo density seen in LA. Recommend KELSY for further evaluation.  4. There is global hypokinesis of the left ventricle with minor regional variations.     05/03/2021 - CXR  Cardiomegaly. Vascular congestion and interstitial edema. Small bilateral pleural effusions. Bibasilar airspace opacities, may be secondary to pneumonia or atelectasis.     11/18/2019 to 12/17/2019 - Event Monitoring  1. Patient monitored for 22d 3h 16m  2. 50 events were transmitted. 0 symptomatic; 50 device triggered.  3. <0.1% AF was found during the monitoring period.  4. SVT occurred 5 time(s) with fastest run 204 BPM.  5. VT occurred 3 time(s) with fastest run 154 BPM.  6. 89, 446 PACs with PAC burden of 4%.  7. 20, 425 PVCs with PVC burden of 1%.     10/11/2019 - TTE  1. The left ventricular systolic function is normal.  2. Spectral Doppler shows an impaired relaxation pattern of left ventricular diastolic filling.     Lab review: I have  personally reviewed the laboratory result(s).    Assessment/Plan   1) CAD   Not on ASA as she is on Eliquis for a-fib  s/p PCI of LAD and Cx  Denies CP, chest discomfort or SOB  BP stable  Continue current medical Rx  Check echo - call with results   Check CMP and Lipid Panel 3 months  F/U 1 year      2) CVA  Event monitor with paroxysmal a-fib  Echo with no shunts  MRA with diffuse intracranial disease  On anticoagulation  KELSY with no LA clot or thrombus     3) Paroxysmal A-Fib   On Eliquis 5 mg BID  Seen by Dr. Corbett  Denies palpitations  In NSR  Continue current medical Rx  F/U 1 year     4) Dyslipidemia  Lipid panel 6/20/2024 with total cholesterol and LDL of 220 and 148 respectively  Not currently on medical therapy   Advised on LDL goal of <70  Advised on Mediterranean style diet   Start atorvastatin 40 mg daily  Repeat lipid panel 3 months  F/U 1 year      5) Non Healing Ulcer of Foot  CTA of the legs review - below knee disease (one vessel runoff on right and 2 vessel runoff on left)  Continue conservative management      6) Asymptomatic Renal Artery Stenosis  No indication for intervention with BP well controlled and no renal insufficiency   BP stable      Scribe Attestation  By signing my name below, I, Vy Farr   attest that this documentation has been prepared under the direction and in the presence of Ramez Maya MD.

## 2024-06-21 ENCOUNTER — APPOINTMENT (OUTPATIENT)
Dept: CARDIOLOGY | Facility: CLINIC | Age: 74
End: 2024-06-21
Payer: MEDICARE

## 2024-06-21 ENCOUNTER — LAB (OUTPATIENT)
Dept: LAB | Facility: LAB | Age: 74
End: 2024-06-21
Payer: MEDICARE

## 2024-06-21 VITALS
HEART RATE: 64 BPM | WEIGHT: 188 LBS | DIASTOLIC BLOOD PRESSURE: 64 MMHG | SYSTOLIC BLOOD PRESSURE: 124 MMHG | BODY MASS INDEX: 28.59 KG/M2

## 2024-06-21 DIAGNOSIS — N18.31 STAGE 3A CHRONIC KIDNEY DISEASE (MULTI): ICD-10-CM

## 2024-06-21 DIAGNOSIS — I25.10 CAD IN NATIVE ARTERY: Primary | ICD-10-CM

## 2024-06-21 DIAGNOSIS — I48.0 PAROXYSMAL ATRIAL FIBRILLATION (MULTI): ICD-10-CM

## 2024-06-21 DIAGNOSIS — R80.9 DIABETES MELLITUS WITH PROTEINURIA (MULTI): ICD-10-CM

## 2024-06-21 DIAGNOSIS — I25.10 CAD IN NATIVE ARTERY: ICD-10-CM

## 2024-06-21 DIAGNOSIS — E78.2 MIXED HYPERLIPIDEMIA: ICD-10-CM

## 2024-06-21 DIAGNOSIS — C50.919 MALIGNANT NEOPLASM OF FEMALE BREAST, UNSPECIFIED ESTROGEN RECEPTOR STATUS, UNSPECIFIED LATERALITY, UNSPECIFIED SITE OF BREAST (MULTI): ICD-10-CM

## 2024-06-21 DIAGNOSIS — E11.29 DIABETES MELLITUS WITH PROTEINURIA (MULTI): ICD-10-CM

## 2024-06-21 DIAGNOSIS — E11.9 TYPE 2 DIABETES MELLITUS WITHOUT COMPLICATION, WITHOUT LONG-TERM CURRENT USE OF INSULIN (MULTI): ICD-10-CM

## 2024-06-21 PROBLEM — M67.919 DISORDER OF ROTATOR CUFF: Status: ACTIVE | Noted: 2022-03-31

## 2024-06-21 PROBLEM — E66.9 OBESITY WITH BODY MASS INDEX 30 OR GREATER: Status: ACTIVE | Noted: 2024-06-21

## 2024-06-21 PROBLEM — K63.1 PERFORATION OF INTESTINE (MULTI): Status: ACTIVE | Noted: 2024-06-21

## 2024-06-21 PROBLEM — M79.89 SWELLING OF UPPER EXTREMITY: Status: ACTIVE | Noted: 2024-06-21

## 2024-06-21 LAB
ALBUMIN SERPL BCP-MCNC: 4 G/DL (ref 3.4–5)
ALP SERPL-CCNC: 97 U/L (ref 33–136)
ALT SERPL W P-5'-P-CCNC: 13 U/L (ref 7–45)
ANION GAP SERPL CALC-SCNC: 10 MMOL/L (ref 10–20)
AST SERPL W P-5'-P-CCNC: 19 U/L (ref 9–39)
BASOPHILS # BLD AUTO: 0.04 X10*3/UL (ref 0–0.1)
BASOPHILS NFR BLD AUTO: 0.6 %
BILIRUB SERPL-MCNC: 0.5 MG/DL (ref 0–1.2)
BUN SERPL-MCNC: 12 MG/DL (ref 6–23)
CALCIUM SERPL-MCNC: 9.2 MG/DL (ref 8.6–10.3)
CHLORIDE SERPL-SCNC: 104 MMOL/L (ref 98–107)
CHOLEST SERPL-MCNC: 220 MG/DL (ref 0–199)
CHOLESTEROL/HDL RATIO: 4.6
CO2 SERPL-SCNC: 28 MMOL/L (ref 21–32)
CREAT SERPL-MCNC: 0.88 MG/DL (ref 0.5–1.05)
EGFRCR SERPLBLD CKD-EPI 2021: 69 ML/MIN/1.73M*2
EOSINOPHIL # BLD AUTO: 0.26 X10*3/UL (ref 0–0.4)
EOSINOPHIL NFR BLD AUTO: 4 %
ERYTHROCYTE [DISTWIDTH] IN BLOOD BY AUTOMATED COUNT: 13 % (ref 11.5–14.5)
EST. AVERAGE GLUCOSE BLD GHB EST-MCNC: 120 MG/DL
GLUCOSE SERPL-MCNC: 95 MG/DL (ref 74–99)
HBA1C MFR BLD: 5.8 %
HCT VFR BLD AUTO: 37.4 % (ref 36–46)
HDLC SERPL-MCNC: 47.4 MG/DL
HGB BLD-MCNC: 12.6 G/DL (ref 12–16)
IMM GRANULOCYTES # BLD AUTO: 0.01 X10*3/UL (ref 0–0.5)
IMM GRANULOCYTES NFR BLD AUTO: 0.2 % (ref 0–0.9)
LDLC SERPL CALC-MCNC: 148 MG/DL
LYMPHOCYTES # BLD AUTO: 1.41 X10*3/UL (ref 0.8–3)
LYMPHOCYTES NFR BLD AUTO: 21.7 %
MCH RBC QN AUTO: 29.4 PG (ref 26–34)
MCHC RBC AUTO-ENTMCNC: 33.7 G/DL (ref 32–36)
MCV RBC AUTO: 87 FL (ref 80–100)
MONOCYTES # BLD AUTO: 0.64 X10*3/UL (ref 0.05–0.8)
MONOCYTES NFR BLD AUTO: 9.9 %
NEUTROPHILS # BLD AUTO: 4.13 X10*3/UL (ref 1.6–5.5)
NEUTROPHILS NFR BLD AUTO: 63.6 %
NON HDL CHOLESTEROL: 173 MG/DL (ref 0–149)
NRBC BLD-RTO: 0 /100 WBCS (ref 0–0)
PLATELET # BLD AUTO: 283 X10*3/UL (ref 150–450)
POTASSIUM SERPL-SCNC: 4.4 MMOL/L (ref 3.5–5.3)
PROT SERPL-MCNC: 6.4 G/DL (ref 6.4–8.2)
RBC # BLD AUTO: 4.29 X10*6/UL (ref 4–5.2)
SODIUM SERPL-SCNC: 138 MMOL/L (ref 136–145)
TRIGL SERPL-MCNC: 121 MG/DL (ref 0–149)
TSH SERPL-ACNC: 3.25 MIU/L (ref 0.44–3.98)
VLDL: 24 MG/DL (ref 0–40)
WBC # BLD AUTO: 6.5 X10*3/UL (ref 4.4–11.3)

## 2024-06-21 PROCEDURE — 80061 LIPID PANEL: CPT

## 2024-06-21 PROCEDURE — 3050F LDL-C >= 130 MG/DL: CPT | Performed by: INTERNAL MEDICINE

## 2024-06-21 PROCEDURE — 99213 OFFICE O/P EST LOW 20 MIN: CPT | Performed by: INTERNAL MEDICINE

## 2024-06-21 PROCEDURE — 3008F BODY MASS INDEX DOCD: CPT | Performed by: INTERNAL MEDICINE

## 2024-06-21 PROCEDURE — 3044F HG A1C LEVEL LT 7.0%: CPT | Performed by: INTERNAL MEDICINE

## 2024-06-21 PROCEDURE — 83036 HEMOGLOBIN GLYCOSYLATED A1C: CPT

## 2024-06-21 PROCEDURE — 3078F DIAST BP <80 MM HG: CPT | Performed by: INTERNAL MEDICINE

## 2024-06-21 PROCEDURE — 1159F MED LIST DOCD IN RCRD: CPT | Performed by: INTERNAL MEDICINE

## 2024-06-21 PROCEDURE — 80053 COMPREHEN METABOLIC PANEL: CPT

## 2024-06-21 PROCEDURE — 3061F NEG MICROALBUMINURIA REV: CPT | Performed by: INTERNAL MEDICINE

## 2024-06-21 PROCEDURE — 85025 COMPLETE CBC W/AUTO DIFF WBC: CPT

## 2024-06-21 PROCEDURE — 84443 ASSAY THYROID STIM HORMONE: CPT

## 2024-06-21 PROCEDURE — 93000 ELECTROCARDIOGRAM COMPLETE: CPT | Performed by: INTERNAL MEDICINE

## 2024-06-21 PROCEDURE — 3074F SYST BP LT 130 MM HG: CPT | Performed by: INTERNAL MEDICINE

## 2024-06-21 PROCEDURE — 1160F RVW MEDS BY RX/DR IN RCRD: CPT | Performed by: INTERNAL MEDICINE

## 2024-06-21 PROCEDURE — 36415 COLL VENOUS BLD VENIPUNCTURE: CPT

## 2024-06-21 RX ORDER — ATORVASTATIN CALCIUM 20 MG/1
40 TABLET, FILM COATED ORAL DAILY
Qty: 180 TABLET | Refills: 3 | Status: SHIPPED | OUTPATIENT
Start: 2024-06-21 | End: 2025-06-21

## 2024-06-21 ASSESSMENT — ENCOUNTER SYMPTOMS: BRUISES/BLEEDS EASILY: 1

## 2024-06-21 NOTE — PATIENT INSTRUCTIONS
As your cholesterol is currently elevated, Dr. Maya has prescribed atorvastatin 40 mg daily. A prescription has been sent to your pharmacy.  Continue all other medications as prescribed. Refills for Eliquis have been sent to your pharmacy.   For conservative management of elevated cholesterol, watch carbohydrate intake (rice, potatoes, pasta, bread, ice-cream all within moderation); increase greens, veggies, fiber, lean protein (fish, turkey, chicken; baked/boiled/grilled, not fried) and fruit.   Dr. Maya has ordered an echocardiogram (ultrasound of the heart) to followup on your heart function and structure. You will be notified of the results once they become available.   Please have blood work drawn in 3 months. You will be notified of the results once they become available.   Followup with Dr. Maya in 1 year, sooner should any issues or concerns arise before then.     If you have any questions or cardiac concerns, please call our office at [phone].

## 2024-07-01 ENCOUNTER — LAB (OUTPATIENT)
Dept: LAB | Facility: LAB | Age: 74
End: 2024-07-01
Payer: MEDICARE

## 2024-07-01 ENCOUNTER — APPOINTMENT (OUTPATIENT)
Dept: LAB | Facility: HOSPITAL | Age: 74
End: 2024-07-01
Payer: MEDICARE

## 2024-07-01 ENCOUNTER — OFFICE VISIT (OUTPATIENT)
Dept: HEMATOLOGY/ONCOLOGY | Facility: CLINIC | Age: 74
End: 2024-07-01
Payer: MEDICARE

## 2024-07-01 VITALS
BODY MASS INDEX: 28.69 KG/M2 | SYSTOLIC BLOOD PRESSURE: 118 MMHG | HEART RATE: 70 BPM | WEIGHT: 188.71 LBS | OXYGEN SATURATION: 97 % | DIASTOLIC BLOOD PRESSURE: 66 MMHG | TEMPERATURE: 97.9 F | RESPIRATION RATE: 16 BRPM

## 2024-07-01 DIAGNOSIS — C50.919 MALIGNANT NEOPLASM OF FEMALE BREAST, UNSPECIFIED ESTROGEN RECEPTOR STATUS, UNSPECIFIED LATERALITY, UNSPECIFIED SITE OF BREAST (MULTI): Primary | ICD-10-CM

## 2024-07-01 DIAGNOSIS — C50.919 MALIGNANT NEOPLASM OF FEMALE BREAST, UNSPECIFIED ESTROGEN RECEPTOR STATUS, UNSPECIFIED LATERALITY, UNSPECIFIED SITE OF BREAST (MULTI): ICD-10-CM

## 2024-07-01 LAB
ANION GAP SERPL CALC-SCNC: 9 MMOL/L (ref 10–20)
BASOPHILS # BLD AUTO: 0.04 X10*3/UL (ref 0–0.1)
BASOPHILS NFR BLD AUTO: 0.6 %
BUN SERPL-MCNC: 12 MG/DL (ref 6–23)
CALCIUM SERPL-MCNC: 9.3 MG/DL (ref 8.6–10.3)
CHLORIDE SERPL-SCNC: 103 MMOL/L (ref 98–107)
CO2 SERPL-SCNC: 29 MMOL/L (ref 21–32)
CREAT SERPL-MCNC: 0.88 MG/DL (ref 0.5–1.05)
EGFRCR SERPLBLD CKD-EPI 2021: 69 ML/MIN/1.73M*2
EOSINOPHIL # BLD AUTO: 0.23 X10*3/UL (ref 0–0.4)
EOSINOPHIL NFR BLD AUTO: 3.4 %
ERYTHROCYTE [DISTWIDTH] IN BLOOD BY AUTOMATED COUNT: 13 % (ref 11.5–14.5)
GLUCOSE SERPL-MCNC: 84 MG/DL (ref 74–99)
HCT VFR BLD AUTO: 36.6 % (ref 36–46)
HGB BLD-MCNC: 12.5 G/DL (ref 12–16)
IMM GRANULOCYTES # BLD AUTO: 0.02 X10*3/UL (ref 0–0.5)
IMM GRANULOCYTES NFR BLD AUTO: 0.3 % (ref 0–0.9)
LYMPHOCYTES # BLD AUTO: 1.43 X10*3/UL (ref 0.8–3)
LYMPHOCYTES NFR BLD AUTO: 21.3 %
MCH RBC QN AUTO: 29.8 PG (ref 26–34)
MCHC RBC AUTO-ENTMCNC: 34.2 G/DL (ref 32–36)
MCV RBC AUTO: 87 FL (ref 80–100)
MONOCYTES # BLD AUTO: 0.66 X10*3/UL (ref 0.05–0.8)
MONOCYTES NFR BLD AUTO: 9.8 %
NEUTROPHILS # BLD AUTO: 4.33 X10*3/UL (ref 1.6–5.5)
NEUTROPHILS NFR BLD AUTO: 64.6 %
NRBC BLD-RTO: 0 /100 WBCS (ref 0–0)
PLATELET # BLD AUTO: 291 X10*3/UL (ref 150–450)
POTASSIUM SERPL-SCNC: 4.4 MMOL/L (ref 3.5–5.3)
RBC # BLD AUTO: 4.2 X10*6/UL (ref 4–5.2)
SODIUM SERPL-SCNC: 137 MMOL/L (ref 136–145)
WBC # BLD AUTO: 6.7 X10*3/UL (ref 4.4–11.3)

## 2024-07-01 PROCEDURE — 80048 BASIC METABOLIC PNL TOTAL CA: CPT

## 2024-07-01 PROCEDURE — 1126F AMNT PAIN NOTED NONE PRSNT: CPT | Performed by: INTERNAL MEDICINE

## 2024-07-01 PROCEDURE — 3061F NEG MICROALBUMINURIA REV: CPT | Performed by: INTERNAL MEDICINE

## 2024-07-01 PROCEDURE — 1160F RVW MEDS BY RX/DR IN RCRD: CPT | Performed by: INTERNAL MEDICINE

## 2024-07-01 PROCEDURE — 1159F MED LIST DOCD IN RCRD: CPT | Performed by: INTERNAL MEDICINE

## 2024-07-01 PROCEDURE — 99213 OFFICE O/P EST LOW 20 MIN: CPT | Performed by: INTERNAL MEDICINE

## 2024-07-01 PROCEDURE — 36415 COLL VENOUS BLD VENIPUNCTURE: CPT

## 2024-07-01 PROCEDURE — 85025 COMPLETE CBC W/AUTO DIFF WBC: CPT

## 2024-07-01 PROCEDURE — 3044F HG A1C LEVEL LT 7.0%: CPT | Performed by: INTERNAL MEDICINE

## 2024-07-01 PROCEDURE — 3074F SYST BP LT 130 MM HG: CPT | Performed by: INTERNAL MEDICINE

## 2024-07-01 PROCEDURE — 3078F DIAST BP <80 MM HG: CPT | Performed by: INTERNAL MEDICINE

## 2024-07-01 PROCEDURE — 3008F BODY MASS INDEX DOCD: CPT | Performed by: INTERNAL MEDICINE

## 2024-07-01 PROCEDURE — 3050F LDL-C >= 130 MG/DL: CPT | Performed by: INTERNAL MEDICINE

## 2024-07-01 ASSESSMENT — PAIN SCALES - GENERAL: PAINLEVEL: 0-NO PAIN

## 2024-07-01 NOTE — PATIENT INSTRUCTIONS
Follow up today for history of right breast cancer diagnosed in 2016.     Return in 1 year for routine follow up with Dr. Tejada.

## 2024-07-01 NOTE — PROGRESS NOTES
Patient Visit Information:   Visit Type: Follow Up Visit      Cancer History:          Breast         AJCC Edition: 7th (AJCC), Diagnosis Date: 13-Dec-2013, IIA, T2 N0 M0      Treatment Synopsis:    Carcinoma right breast diagnosed in December 2016.  Had right modified radical mastectomy.  Estrogen receptor positive, HER-2 negative.  Adjuvant chemotherapy with Adriamycin/Cytoxan followed by Taxotere completed June 2014.  Hormonal treatment with Arimidex discontinued secondary to side effects September 2014  Tamoxifen discontinued because of side effects February 2015  Aromasin discontinued secondary to side effects June 2015  Letrozole 2.5 mg daily, discontinued 2019        History of Present Illness:      ID Statement:    RONDA MEHTA is a 72 year old Female        Chief Complaint: Office visit for follow-up of breast  cancer.   Interval History:    This is a 70 years old lady who has a carcinoma of the breast.  She had a mammogram in December 2013 which showed a tumor in the right breast at 9:00.  She had excisional  biopsy on December 13, 2013 wishes invasive ductal carcinoma.  She underwent right modified radical mastectomy at [facility] on December 17, 2013.  Tumor measured 3.8×0.1×0.7 cm.  Estrogen receptor were positive progesterone receptor  were positive HER-2 was not amplified.  She had Oncotype DX done which showed a recurrent wart of 30 with an average rate of distance recurrence of 20%.     She was staged as T2, NO, MO, stage IIa.  She started chemotherapy until [facility] with Taxotere, Adriamycin, Cytoxan.  After first treatment patient became neutropenic Fever and was admitted to [facility].  She was treated with  antibiotics and patient decided to keep her treatment at [facility].  She completed her chemotherapy with Adriamycin and Cytoxan followed by Taxotere.  Patient has toxicity.  She is a diabetic and has neuropathy which complicated side  effects.   After treatment patient was started on hormonal treatment.  She started with Arimidex.  Patient has weakness joint pains and could not tolerate.  She was changed to tamoxifen but had side effects of headache pains aches and could not continue it.  She  was then started on Aromasin but again had side effects of arthralgia and stopped it.             7/1/24  History of right of breast cancer in remission since 2014.      She is doing well.  No cough, chest pain or shortness of breath.  No headaches or dizziness.  Patient was recently admitted in the hospital because of pneumonia clinically improving, other review of  system is unremarkable     Review of Systems:   Review of Systems:    Head and neck: No headaches or dizziness.     HEENT: No sore throat or sinusitis.  Hearing is normal eyesight is good.  Cardiac: No chest pain or palpitations  Pulmonary no cough or shortness of breath.  GI: Appetite is good and weight stable.  No constipation or diarrhea.  No abdominal pain  Genitourinary: No frequency or urgency.  No polyuria or dysuria.  Musculocutaneous: No arthritis.  Endocrine: Has diabetes.  Patient on insulin.  Skin: No rash or itching.  Neuromuscular no fainting or dizziness.  No history of convulsions.  tingling or numbness.  Neuropathy from diabetes and chemotherapy.        Allergies and Intolerances:       Allergies:         Percocet: Drug, Other, Active         codeine: Drug, Unknown, Active         lisinopril: Drug, Other (Moderate), Active         vancomycin: Drug, Unknown, Active         Levaquin: Drug, Unknown, Active         Ativan: Drug, Unknown, Active         sulfa drugs: Drug Category, Unknown, Active     Outpatient Medication Profile:  * Patient Currently Takes Medications as of 05-Jun-2023 09:13 documented in Structured Notes         carvedilol 3.125 mg oral tablet : Last Dose Taken:  , 1 tab(s) orally 2 times a day, Start Date: 24-May-2022         furosemide 40 mg oral tablet: Last  Dose Taken:  , 1 tab(s) orally once  a day, Start Date: 24-May-2022         spironolactone 25 mg oral tablet: Last Dose Taken:  , 1 tab(s) orally  2 times a day, Start Date: 24-May-2022         Linzess 145 mcg oral capsule: Last Dose Taken:  , 1 cap(s) orally once  a day         Eliquis 5 mg oral tablet: Last Dose Taken:  , 1 tab(s) orally 2 times  a day         Sudafed 12-Hour 120 mg oral tablet, extended release: Last Dose Taken:   , 1 tab(s) orally every 12 hours, As Needed         HYDROCODONE-ACETAMIN 5-325 MG: Last Dose Taken:  , take 1 tablet by mouth  twice a day if needed for pain         HumaLOG 100 units/mL injectable solution: Last Dose Taken:  , sliding  scale injectable 3 times a day (approx. 24units TID)         gabapentin 300 mg oral capsule: Last Dose Taken:  , 1 cap(s) orally every  4 hours, As Needed         Levemir 100 units/mL subcutaneous solution: Last Dose Taken:  , 40 unit(s)  subcutaneous once a day (at bedtime)         Narcan 4 mg/0.1 mL nasal spray: Last Dose Taken:  , 1 spray(s) nasal  once, As Needed         clopidogrel 75 mg oral tablet: Last Dose Taken:  , 1 tab(s) orally once  a day             Medical History:         Malignant neoplasm of right breast: ICD-10: C50.911, Status:  Active         Malignant neoplasm of right breast: ICD-10: C50.911, Status:  Active         Neuropathy: ICD-10: G62.9, Status: Active         Hypertension, benign: ICD-10: I10, Status: Active         Diabetes: ICD-10: E11.9, Status: Active         Breast cancer, left: ICD-10: C50.912, Status: Active       Surg History:         History of hernia repair: ICD-10: Z98.89, Status: Active         History of right mastectomy: ICD-10: Z90.11, Status: Active         Attention to colostomy: ICD-10: Z43.3, Status: Active         History of  section: ICD-10: Z98.89, Status: Active         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: ICD-10: S98.319A, Status: Active         Previous back surgery: ICD-10: Z98.89, Status: Active         Social History:   Social Substance History:  ·  Smoking Status never smoker   ·  Tobacco Use denies   ·  Alcohol Use occasionally   ·  Drug Use denies   ·  Additional History     Social history: She is a realtor.(1)           Vitals and Measurements:   Vitals: Temp: 36.8  HR: 61  RR: 16  BP: 108/65  SPO2%:   91   Measurements: HT(cm): 172.6  WT(kg): 81.9  BSA: 1.98   BMI:  27.4   Last 3 Weights & Heights: Date:                           Weight/Scale Type:                    Height:   05-Jun-2023 09:35                81.9  kg                     172.6  cm  06-Jun-2022 09:00                100.1  kg                     172.6  cm      Physical Exam:      Constitutional: , awake/alert/oriented x3, no distress,   Eyes: PERRL, EOMI, clear sclera   ENMT: mucous membranes moist, no apparent injury,  no lesions seen   Head/Neck: Neck supple,   thyroid without mass or  tenderness, No JVD, trachea midline, no bruits   Respiratory/Thorax: Patent airways, CTAB, normal  breath sounds with good chest expansion, thorax symmetric   Cardiovascular: Regular, rate and rhythm, no murmurs,    normal S 1and S 2   Gastrointestinal: Nondistended, soft, non-tender,  no rebound tenderness or guarding, no masses palpable, no organomegaly, +BS,   Musculoskeletal: ROM intact, no joint swelling, normal  strength.  Amputated metatarsal.   Extremities: normal extremities, no cyanosis edema,   , no clubbing   Neurological: alert and oriented x3, intact senses,  motor, response and reflexes, normal strength.   Breast: Right mastectomy.  Left breast no mass or  discharge from nipple.  No tenderness.   Lymphatic: No significant lymphadenopathy   Psychological: Appropriate mood and behavior   Skin: Warm and dry, no lesions, no rashes         Lab Results:      Units 12:12  (7/1/24) 10 d ago  (6/21/24) 1 yr ago  (6/5/23) 1 yr ago  (1/25/23) 1 yr ago  (9/26/22) 2 yr ago  (6/6/22) 2 yr ago  (5/24/22)   WBC  4.4 - 11.3 x10*3/uL 6.7 6.5 6.0 R 7.0 R 7.7 R  6.8 R 8.6 R   nRBC  0.0 - 0.0 /100 WBCs 0.0 0.0        RBC  4.00 - 5.20 x10*6/uL 4.20 4.29 4.24 R 4.48 R 4.41 R 5.06 R 4.64 R   Hemoglobin  12.0 - 16.0 g/dL 12.5 12.6 12.7 13.3 13.5 14.8 13.6   Hematocrit  36.0 - 46.0 % 36.6 37.4 37.0 39.2 38.7 44.8 40.6   MCV  80 - 100 fL 87 87 87 88 88 89 88   MCH  26.0 - 34.0 pg 29.8 29.4        MCHC  32.0 - 36.0 g/dL 34.2 33.7 34.3 33.9 34.9 33.0 33.5   RDW  11.5 - 14.5 % 13.0 13.0 12.9 13.5 13.5 14.2 14.6 High    Platelets  150 - 450 x10*3/uL 291           ·  Results         Radiology Result:     ·  Results           Impression:     1. Occlusion of the distal 1/3 of the right anterior tibial artery  and the distal 2/3 of the right peroneal artery. The right posterior  tibial artery and plantar artery are patent.  2. There is a three-vessel runoff to the left ankle without  hemodynamically significant stenosis.  3. Additional stable chronic findings as above.      CTA Abd Aorta with Bilat Iliofem extr runoff w/wo cont post proc [Jan 30 2023 10:02AM]        Assessment and Plan:      Assessment and Plan:   Assessment:    Assessment:  #1 carcinoma right breast post modified radical mastectomy.  Stage II a.  Post adjuvant chemotherapy with Adriamycin and Cytoxan and Taxotere and hormonal treatment.     #2 neuropathy     #3 arthralgias     #4 diabetes     #5 hypertension .     #3 coronary artery disease.     7/1/24     #1 carcinoma right breast post modified radical mastectomy.  Stage II a.  Post adjuvant chemotherapy with Adriamycin and Cytoxan and Taxotere and hormonal treatment patient 2019      In remission.      Follow-up after 1 year       mammogram on a yearly basis        Plan:    She had stage IIa breast cancer.  She had adjuvant chemotherapy.  Patient was not able to tolerate hormonal treatment.  She had side effects from tamoxifen and aromatase  inhibitors.  She doing well and continues to be in remission.   Plan As above  Time spent: 20 minutes.

## 2024-07-10 ENCOUNTER — APPOINTMENT (OUTPATIENT)
Dept: PRIMARY CARE | Facility: CLINIC | Age: 74
End: 2024-07-10
Payer: MEDICARE

## 2024-07-10 VITALS
HEIGHT: 68 IN | WEIGHT: 187 LBS | SYSTOLIC BLOOD PRESSURE: 140 MMHG | RESPIRATION RATE: 18 BRPM | DIASTOLIC BLOOD PRESSURE: 80 MMHG | OXYGEN SATURATION: 97 % | HEART RATE: 68 BPM | BODY MASS INDEX: 28.34 KG/M2

## 2024-07-10 DIAGNOSIS — I25.10 CAD IN NATIVE ARTERY: ICD-10-CM

## 2024-07-10 DIAGNOSIS — G58.9 MONONEUROPATHY: ICD-10-CM

## 2024-07-10 DIAGNOSIS — M94.0 COSTOCHONDRITIS: ICD-10-CM

## 2024-07-10 DIAGNOSIS — G62.9 PERIPHERAL POLYNEUROPATHY: ICD-10-CM

## 2024-07-10 DIAGNOSIS — T46.6X5A STATIN-INDUCED MYOSITIS: Primary | ICD-10-CM

## 2024-07-10 DIAGNOSIS — E78.2 MIXED HYPERLIPIDEMIA: ICD-10-CM

## 2024-07-10 DIAGNOSIS — I48.0 PAROXYSMAL ATRIAL FIBRILLATION (MULTI): ICD-10-CM

## 2024-07-10 DIAGNOSIS — E11.29 DIABETES MELLITUS WITH PROTEINURIA (MULTI): ICD-10-CM

## 2024-07-10 DIAGNOSIS — R80.9 DIABETES MELLITUS WITH PROTEINURIA (MULTI): ICD-10-CM

## 2024-07-10 DIAGNOSIS — M60.9 STATIN-INDUCED MYOSITIS: Primary | ICD-10-CM

## 2024-07-10 DIAGNOSIS — J30.9 ALLERGIC RHINITIS, UNSPECIFIED SEASONALITY, UNSPECIFIED TRIGGER: ICD-10-CM

## 2024-07-10 DIAGNOSIS — J01.81 OTHER ACUTE RECURRENT SINUSITIS: ICD-10-CM

## 2024-07-10 DIAGNOSIS — I50.812 CHRONIC RIGHT-SIDED CONGESTIVE HEART FAILURE (MULTI): ICD-10-CM

## 2024-07-10 PROCEDURE — 3050F LDL-C >= 130 MG/DL: CPT | Performed by: FAMILY MEDICINE

## 2024-07-10 PROCEDURE — 99214 OFFICE O/P EST MOD 30 MIN: CPT | Performed by: FAMILY MEDICINE

## 2024-07-10 PROCEDURE — 3061F NEG MICROALBUMINURIA REV: CPT | Performed by: FAMILY MEDICINE

## 2024-07-10 PROCEDURE — 3044F HG A1C LEVEL LT 7.0%: CPT | Performed by: FAMILY MEDICINE

## 2024-07-10 PROCEDURE — 1159F MED LIST DOCD IN RCRD: CPT | Performed by: FAMILY MEDICINE

## 2024-07-10 PROCEDURE — 3008F BODY MASS INDEX DOCD: CPT | Performed by: FAMILY MEDICINE

## 2024-07-10 PROCEDURE — 1123F ACP DISCUSS/DSCN MKR DOCD: CPT | Performed by: FAMILY MEDICINE

## 2024-07-10 PROCEDURE — 1160F RVW MEDS BY RX/DR IN RCRD: CPT | Performed by: FAMILY MEDICINE

## 2024-07-10 PROCEDURE — 3079F DIAST BP 80-89 MM HG: CPT | Performed by: FAMILY MEDICINE

## 2024-07-10 PROCEDURE — 3077F SYST BP >= 140 MM HG: CPT | Performed by: FAMILY MEDICINE

## 2024-07-10 PROCEDURE — 1158F ADVNC CARE PLAN TLK DOCD: CPT | Performed by: FAMILY MEDICINE

## 2024-07-10 PROCEDURE — 1125F AMNT PAIN NOTED PAIN PRSNT: CPT | Performed by: FAMILY MEDICINE

## 2024-07-10 RX ORDER — TIRZEPATIDE 15 MG/.5ML
0.5 INJECTION, SOLUTION SUBCUTANEOUS
Qty: 2 ML | Refills: 11 | Status: SHIPPED | OUTPATIENT
Start: 2024-07-14 | End: 2025-07-14

## 2024-07-10 RX ORDER — HYDROCODONE BITARTRATE AND ACETAMINOPHEN 5; 325 MG/1; MG/1
1 TABLET ORAL EVERY 8 HOURS PRN
Qty: 90 TABLET | Refills: 0 | Status: SHIPPED | OUTPATIENT
Start: 2024-07-10 | End: 2024-08-09

## 2024-07-10 RX ORDER — PREDNISONE 20 MG/1
TABLET ORAL
Qty: 18 TABLET | Refills: 0 | Status: SHIPPED | OUTPATIENT
Start: 2024-07-10

## 2024-07-10 RX ORDER — PSEUDOEPHEDRINE HCL 120 MG/1
120 TABLET, FILM COATED, EXTENDED RELEASE ORAL EVERY 12 HOURS PRN
Qty: 60 TABLET | Refills: 11 | Status: SHIPPED | OUTPATIENT
Start: 2024-07-10 | End: 2025-07-10

## 2024-07-10 RX ORDER — GABAPENTIN 300 MG/1
300 CAPSULE ORAL EVERY 4 HOURS
Qty: 540 CAPSULE | Refills: 1 | Status: SHIPPED | OUTPATIENT
Start: 2024-07-10 | End: 2025-07-10

## 2024-07-10 ASSESSMENT — ANXIETY QUESTIONNAIRES
7. FEELING AFRAID AS IF SOMETHING AWFUL MIGHT HAPPEN: NOT AT ALL
6. BECOMING EASILY ANNOYED OR IRRITABLE: NOT AT ALL
1. FEELING NERVOUS, ANXIOUS, OR ON EDGE: NOT AT ALL
2. NOT BEING ABLE TO STOP OR CONTROL WORRYING: NOT AT ALL
5. BEING SO RESTLESS THAT IT IS HARD TO SIT STILL: NOT AT ALL
4. TROUBLE RELAXING: NOT AT ALL
GAD7 TOTAL SCORE: 0
3. WORRYING TOO MUCH ABOUT DIFFERENT THINGS: NOT AT ALL

## 2024-07-10 ASSESSMENT — ENCOUNTER SYMPTOMS
OCCASIONAL FEELINGS OF UNSTEADINESS: 0
ARTHRALGIAS: 1
LOSS OF SENSATION IN FEET: 0
CHEST TIGHTNESS: 1
BACK PAIN: 1
DEPRESSION: 0

## 2024-07-10 ASSESSMENT — COLUMBIA-SUICIDE SEVERITY RATING SCALE - C-SSRS
1. IN THE PAST MONTH, HAVE YOU WISHED YOU WERE DEAD OR WISHED YOU COULD GO TO SLEEP AND NOT WAKE UP?: NO
2. HAVE YOU ACTUALLY HAD ANY THOUGHTS OF KILLING YOURSELF?: NO
6. HAVE YOU EVER DONE ANYTHING, STARTED TO DO ANYTHING, OR PREPARED TO DO ANYTHING TO END YOUR LIFE?: NO

## 2024-07-10 ASSESSMENT — PAIN SCALES - GENERAL: PAINLEVEL: 4

## 2024-07-10 NOTE — PROGRESS NOTES
Subjective   Patient ID: Charlene Spence is a 73 y.o. female.    Chest Pain   Associated symptoms include back pain.   OARRS:  Anca Jerry MD on 7/10/2024  4:57 PM  I have personally reviewed the OARRS report for Kerry Spence. I have considered the risks of abuse, dependence, addiction and diversion and I believe that it is clinically appropriate for Kerry Spence to be prescribed this medication    Is the patient prescribed a combination of a benzodiazepine and opioid?  Yes, I feel it is clincially indicated to continue the medication and have discussed with the patient risks/benefits/alternatives.    Last Urine Drug Screen / ordered today: Yes  Recent Results (from the past 8760 hour(s))   Confirmation Opiate/Opioid/Benzo Prescription Compliance    Collection Time: 04/10/24  5:06 PM   Result Value Ref Range    Clonazepam <25 <25 ng/mL    7-Aminoclonazepam <25 <25 ng/mL    Alprazolam <25 <25 ng/mL    Alpha-Hydroxyalprazolam <25 <25 ng/mL    Midazolam <25 <25 ng/mL    Alpha-Hydroxymidazolam <25 <25 ng/mL    Chlordiazepoxide <25 <25 ng/mL    Diazepam <25 <25 ng/mL    Nordiazepam <25 <25 ng/mL    Temazepam <25 <25 ng/mL    Oxazepam <25 <25 ng/mL    Lorazepam <25 <25 ng/mL    Methadone <25 <25 ng/mL    EDDP <25 <25 ng/mL    6-Acetylmorphine <25 <25 ng/mL    Codeine <50 <50 ng/mL    Hydrocodone <25 <25 ng/mL    Hydromorphone <25 <25 ng/mL    Morphine  <50 <50 ng/mL    Norhydrocodone <25 <25 ng/mL    Noroxycodone <25 <25 ng/mL    Oxycodone <25 <25 ng/mL    Oxymorphone <25 <25 ng/mL    Fentanyl <2.5 <2.5 ng/mL    Norfentanyl <2.5 <2.5 ng/mL    Tramadol <50 <50 ng/mL    O-Desmethyltramadol <50 <50 ng/mL    Zolpidem <25 <25 ng/mL    Zolpidem Metabolite (ZCA) <25 <25 ng/mL   Screen Opiate/Opioid/Benzo Prescription Compliance    Collection Time: 04/10/24  5:06 PM   Result Value Ref Range    Creatinine, Urine Random 60.7 20.0 - 320.0 mg/dL    Amphetamine Screen, Urine Presumptive Negative Presumptive Negative     Barbiturate Screen, Urine Presumptive Negative Presumptive Negative    Cannabinoid Screen, Urine Presumptive Negative Presumptive Negative    Cocaine Metabolite Screen, Urine Presumptive Negative Presumptive Negative    PCP Screen, Urine Presumptive Negative Presumptive Negative     Results are as expected.         Controlled Substance Agreement:  Date of the Last Agreement: 04/2024  Reviewed Controlled Substance Agreement including but not limited to the benefits, risks, and alternatives to treatment with a Controlled Substance medication(s).    Opioids:  What is the patient's goal of therapy? Improved pain  Is this being achieved with current treatment? yes    I have calculated the patient's Morphine Dose Equivalent (MED):   I have considered referral to Pain Management and/or a specialist, and do not feel it is necessary at this time.    I feel that it is clinically indicated to continue this current medication regimen after consideration of alternative therapies, and other non-opioid treatment.    Opioid Risk Screening:  No data recorded    Pain Assessment:  6/10  73 year old female for follow up. She states chest pain, right sided, relieves to press on and bend over, 5 days , stopped atorvastatin. Never had pan like this. No diaphoresis, no nausea. Is using tanning cream and thinks she may be allergic.   Review of Systems   Respiratory:  Positive for chest tightness.    Cardiovascular:  Positive for chest pain.   Musculoskeletal:  Positive for arthralgias and back pain.   Skin:  Positive for rash.       Objective   Physical Exam  Vitals reviewed.   Constitutional:       Appearance: Normal appearance.   HENT:      Head: Normocephalic and atraumatic.      Right Ear: Tympanic membrane normal.      Left Ear: Tympanic membrane normal.      Nose: Nose normal.      Mouth/Throat:      Mouth: Mucous membranes are moist.      Pharynx: Oropharynx is clear.   Eyes:      Extraocular Movements: Extraocular movements intact.       Conjunctiva/sclera: Conjunctivae normal.      Pupils: Pupils are equal, round, and reactive to light.   Cardiovascular:      Rate and Rhythm: Normal rate and regular rhythm.      Pulses: Normal pulses.      Heart sounds: Normal heart sounds.   Pulmonary:      Effort: Pulmonary effort is normal.      Breath sounds: Normal breath sounds.   Abdominal:      General: Abdomen is flat. Bowel sounds are normal.      Palpations: Abdomen is soft.   Musculoskeletal:         General: Normal range of motion.      Cervical back: Normal range of motion and neck supple.   Skin:     General: Skin is warm and dry.      Capillary Refill: Capillary refill takes less than 2 seconds.      Findings: Rash present.   Neurological:      General: No focal deficit present.      Mental Status: She is alert and oriented to person, place, and time.   Psychiatric:         Mood and Affect: Mood normal.         Behavior: Behavior normal.         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   Peripheral polyneuropathy  -     Follow Up In [facility] - PCP - Established  -     gabapentin (Neurontin) 300 mg capsule; Take 1 capsule (300 mg) by mouth every 4 hours.  Pain on daily basis  Paroxysmal atrial fibrillation (Multi)-Stable and controlled  - regular rhythm today  -     apixaban (Eliquis) 5 mg tablet; Take 1 tablet (5 mg) by mouth 2 times a day.  CAD in native artery-Stable and controlled    Diabetes mellitus with proteinuria (Multi)  -     tirzepatide (Mounjaro) 15 mg/0.5 mL pen injector; Inject 15 mg under the skin 1 (one) time per week.  A1C is as low as it has been. Sees Dr. Cardoso (Endocrinology) No longer on insulin,   Other acute recurrent sinusitis- takes chronically.   -     pseudoephedrine ER (Sudafed 12 Hour) 120 mg 12 hr tablet; Take 1 tablet (120 mg) by mouth every 12 hours if needed for congestion.  Allergic rhinitis, unspecified seasonality,  unspecified trigger  -     pseudoephedrine ER (Sudafed 12 Hour) 120 mg 12 hr tablet; Take 1 tablet (120 mg) by mouth every 12 hours if needed for congestion.  Chronic right-sided congestive heart failure (Multi)  Mixed hyperlipidemia- a bit elelvated, but declines statin.   Mononeuropathy  -     HYDROcodone-acetaminophen (Norco) 5-325 mg tablet; Take 1 tablet by mouth every 8 hours if needed for severe pain (7 - 10).  Costochondritis- has been present for about a week. No injury, but on right side of sternum.   -     predniSONE (Deltasone) 20 mg tablet; Take 3 tabs (60mg) daily for 3 days, then take 2 tabs (40mg) daily for 3 days, then take 1 tab (20mg) daily for 3 days.    Follow up three months.

## 2024-07-10 NOTE — PATIENT INSTRUCTIONS
Diagnoses and all orders for this visit:  Statin-induced myositis  Peripheral polyneuropathy  -     Follow Up In [facility] - PCP - Established  -     gabapentin (Neurontin) 300 mg capsule; Take 1 capsule (300 mg) by mouth every 4 hours.  Paroxysmal atrial fibrillation (Multi)  -     apixaban (Eliquis) 5 mg tablet; Take 1 tablet (5 mg) by mouth 2 times a day.  CAD in native artery  Diabetes mellitus with proteinuria (Multi)  -     tirzepatide (Mounjaro) 15 mg/0.5 mL pen injector; Inject 15 mg under the skin 1 (one) time per week.  Other acute recurrent sinusitis  -     pseudoephedrine ER (Sudafed 12 Hour) 120 mg 12 hr tablet; Take 1 tablet (120 mg) by mouth every 12 hours if needed for congestion.  Allergic rhinitis, unspecified seasonality, unspecified trigger  -     pseudoephedrine ER (Sudafed 12 Hour) 120 mg 12 hr tablet; Take 1 tablet (120 mg) by mouth every 12 hours if needed for congestion.  Chronic right-sided congestive heart failure (Multi)  Mixed hyperlipidemia  Mononeuropathy  -     HYDROcodone-acetaminophen (Norco) 5-325 mg tablet; Take 1 tablet by mouth every 8 hours if needed for severe pain (7 - 10).  Costochondritis  -     predniSONE (Deltasone) 20 mg tablet; Take 3 tabs (60mg) daily for 3 days, then take 2 tabs (40mg) daily for 3 days, then take 1 tab (20mg) daily for 3 days.

## 2024-07-11 ENCOUNTER — HOSPITAL ENCOUNTER (OUTPATIENT)
Dept: CARDIOLOGY | Facility: HOSPITAL | Age: 74
Discharge: HOME | End: 2024-07-11
Payer: MEDICARE

## 2024-07-11 DIAGNOSIS — I48.0 PAROXYSMAL ATRIAL FIBRILLATION (MULTI): ICD-10-CM

## 2024-07-11 DIAGNOSIS — I25.10 CAD IN NATIVE ARTERY: ICD-10-CM

## 2024-07-11 LAB
EJECTION FRACTION APICAL 4 CHAMBER: 31.2
EJECTION FRACTION: 33 %
GLOBAL LONGITUDINAL STRAIN: 9.6 %
LEFT ATRIUM VOLUME AREA LENGTH INDEX BSA: 34.2 ML/M2
LEFT VENTRICLE INTERNAL DIMENSION DIASTOLE: 5.27 CM (ref 3.5–6)
LEFT VENTRICULAR OUTFLOW TRACT DIAMETER: 2.02 CM
LV EJECTION FRACTION BIPLANE: 35 %
MITRAL VALVE E/A RATIO: 1.5
MITRAL VALVE E/E' RATIO: 18.4
RIGHT VENTRICLE FREE WALL PEAK S': 14.78 CM/S
RIGHT VENTRICLE PEAK SYSTOLIC PRESSURE: 37.3 MMHG
TRICUSPID ANNULAR PLANE SYSTOLIC EXCURSION: 1.8 CM

## 2024-07-11 PROCEDURE — 93306 TTE W/DOPPLER COMPLETE: CPT | Performed by: INTERNAL MEDICINE

## 2024-07-11 PROCEDURE — 93306 TTE W/DOPPLER COMPLETE: CPT

## 2024-07-12 DIAGNOSIS — R94.30 CARDIAC LEFT VENTRICULAR EJECTION FRACTION 30-35 PERCENT: ICD-10-CM

## 2024-07-12 DIAGNOSIS — I43 CARDIOMYOPATHY IN DISEASES CLASSIFIED ELSEWHERE (MULTI): ICD-10-CM

## 2024-07-12 DIAGNOSIS — I42.9 CARDIOMYOPATHY, UNSPECIFIED TYPE (MULTI): Primary | ICD-10-CM

## 2024-07-12 RX ORDER — CARVEDILOL 3.12 MG/1
3.12 TABLET ORAL
Qty: 180 TABLET | Refills: 0 | Status: SHIPPED | OUTPATIENT
Start: 2024-07-12 | End: 2025-07-12

## 2024-07-12 NOTE — TELEPHONE ENCOUNTER
7/12 - Called and spoke with patient to schedule limited echo, follow up with Dr. Maya, and follow up with Dr. Corbett for consideration of ICD. Patient agreeable to echo and follow up, but would like to get a second opinion outside of  regarding ICD and does not wish to proceed with Dr. Corbett at this time.     - Justyn BENOIT

## 2024-07-12 NOTE — TELEPHONE ENCOUNTER
7/12/24  1527  Called echo results to patient and plan for starting coreg, Entresto, followed by a limited echo in 6 weeks and follow up with  after echo. Also informed for Referral with Dr. Corbett for possible ICD placement.    Patient surprised by results, but verbalized understanding of them and was agreeable to treatment plan; did request appt with Dr. Corbett to not be a [facility].    Coreg and Entresto sent for approval, limited echo ordered along with referral; request for separate appt not at [facility] made and for follow up with Dr. Maya.          ----- Message from Ramez Maya sent at 7/12/2024  8:34 AM EDT -----  LVEF low on echo - Start Coreg 3.125 mg BID and entresto low dose - Follow up limited echo in 6 weeks and see me after that. Also should follow up with Aric - ? Need for ICD

## 2024-07-15 DIAGNOSIS — M94.0 COSTOCHONDRITIS: ICD-10-CM

## 2024-07-15 DIAGNOSIS — G58.9 MONONEUROPATHY: ICD-10-CM

## 2024-07-15 RX ORDER — PREDNISONE 20 MG/1
TABLET ORAL
Qty: 18 TABLET | Refills: 0 | Status: SHIPPED | OUTPATIENT
Start: 2024-07-15 | End: 2024-07-16 | Stop reason: SDUPTHER

## 2024-07-15 RX ORDER — HYDROCODONE BITARTRATE AND ACETAMINOPHEN 5; 325 MG/1; MG/1
1 TABLET ORAL EVERY 8 HOURS PRN
Qty: 90 TABLET | Refills: 0 | Status: SHIPPED | OUTPATIENT
Start: 2024-07-15 | End: 2024-08-14

## 2024-07-16 ENCOUNTER — TELEPHONE (OUTPATIENT)
Dept: PRIMARY CARE | Facility: CLINIC | Age: 74
End: 2024-07-16
Payer: MEDICARE

## 2024-07-16 DIAGNOSIS — M94.0 COSTOCHONDRITIS: ICD-10-CM

## 2024-07-16 DIAGNOSIS — G58.9 MONONEUROPATHY: ICD-10-CM

## 2024-07-16 RX ORDER — PREDNISONE 20 MG/1
TABLET ORAL
Qty: 18 TABLET | Refills: 0 | Status: SHIPPED | OUTPATIENT
Start: 2024-07-16

## 2024-07-16 NOTE — TELEPHONE ENCOUNTER
Rx Refill Request Telephone Encounter    Name:  Kerry Spence  :  [id number]  Medication Name:  Hydrocodone-acetaminophen  5-325 mg        Take 1 tablet by mouth every 8 hours if needed for severe pain  Specific Pharmacy location:  [facility]  **Prescription sent on 7/15 were sent to wrong pharmacyRx Refill Request Telephone Encounter    Name:  Kerry Spence  :  [id number]  Medication Name:  Prednisone  20 mg        Take 3 tabs daily for 3 days, then take 2 tabs daily for 3 days, then take 1 tab daily for 3 days

## 2024-07-19 ENCOUNTER — OFFICE VISIT (OUTPATIENT)
Dept: WOUND CARE | Facility: CLINIC | Age: 74
End: 2024-07-19
Payer: MEDICARE

## 2024-07-19 PROCEDURE — 87070 CULTURE OTHR SPECIMN AEROBIC: CPT | Mod: OUT | Performed by: PODIATRIST

## 2024-07-19 PROCEDURE — 99213 OFFICE O/P EST LOW 20 MIN: CPT

## 2024-07-20 ENCOUNTER — LAB REQUISITION (OUTPATIENT)
Dept: LAB | Facility: HOSPITAL | Age: 74
End: 2024-07-20
Payer: MEDICARE

## 2024-07-20 DIAGNOSIS — E11.621 TYPE 2 DIABETES MELLITUS WITH FOOT ULCER (CODE) (MULTI): ICD-10-CM

## 2024-07-21 LAB
BACTERIA SPEC CULT: ABNORMAL
GRAM STN SPEC: ABNORMAL
GRAM STN SPEC: ABNORMAL

## 2024-07-22 ENCOUNTER — APPOINTMENT (OUTPATIENT)
Dept: WOUND CARE | Facility: CLINIC | Age: 74
End: 2024-07-22
Payer: MEDICARE

## 2024-07-22 LAB
BACTERIA SPEC CULT: ABNORMAL
GRAM STN SPEC: ABNORMAL
GRAM STN SPEC: ABNORMAL

## 2024-07-29 ENCOUNTER — APPOINTMENT (OUTPATIENT)
Dept: WOUND CARE | Facility: CLINIC | Age: 74
End: 2024-07-29
Payer: MEDICARE

## 2024-08-01 ENCOUNTER — TELEPHONE (OUTPATIENT)
Dept: CARDIOLOGY | Facility: HOSPITAL | Age: 74
End: 2024-08-01
Payer: MEDICARE

## 2024-08-01 NOTE — TELEPHONE ENCOUNTER
8/1/24  6863  RN called and gave two provider names Breezy and Lorena for [facility] EP.   Sarah RN   ----- Message from Shweta BHATIA sent at 7/31/2024  3:48 PM EDT -----  Regarding: AG patient referral request  She called in talking about her referral from Dr. Maya for an EP.   She wants to see someone in the [facility] for this specialty and is asking for his recommendation of a provider there if he has one.     Asked we call her with some info regarding.

## 2024-08-02 ENCOUNTER — HOSPITAL ENCOUNTER (OUTPATIENT)
Dept: RADIOLOGY | Facility: CLINIC | Age: 74
Discharge: HOME | End: 2024-08-02
Payer: MEDICARE

## 2024-08-02 ENCOUNTER — OFFICE VISIT (OUTPATIENT)
Dept: WOUND CARE | Facility: CLINIC | Age: 74
End: 2024-08-02
Payer: MEDICARE

## 2024-08-02 DIAGNOSIS — M86.171 OTHER ACUTE OSTEOMYELITIS, RIGHT ANKLE AND FOOT (MULTI): ICD-10-CM

## 2024-08-02 PROCEDURE — 99213 OFFICE O/P EST LOW 20 MIN: CPT

## 2024-08-02 PROCEDURE — 73630 X-RAY EXAM OF FOOT: CPT | Mod: RT

## 2024-08-13 ENCOUNTER — HOSPITAL ENCOUNTER (OUTPATIENT)
Dept: CARDIOLOGY | Facility: HOSPITAL | Age: 74
Discharge: HOME | End: 2024-08-13
Payer: MEDICARE

## 2024-08-13 ENCOUNTER — TELEPHONE (OUTPATIENT)
Dept: CARDIOLOGY | Facility: HOSPITAL | Age: 74
End: 2024-08-13
Payer: MEDICARE

## 2024-08-13 DIAGNOSIS — I43 CARDIOMYOPATHY IN DISEASES CLASSIFIED ELSEWHERE (MULTI): ICD-10-CM

## 2024-08-13 DIAGNOSIS — I42.9 CARDIOMYOPATHY, UNSPECIFIED TYPE (MULTI): ICD-10-CM

## 2024-08-13 DIAGNOSIS — R94.30 CARDIAC LEFT VENTRICULAR EJECTION FRACTION 30-35 PERCENT: ICD-10-CM

## 2024-08-13 PROCEDURE — 93308 TTE F-UP OR LMTD: CPT | Performed by: INTERNAL MEDICINE

## 2024-08-13 PROCEDURE — 93308 TTE F-UP OR LMTD: CPT

## 2024-08-13 NOTE — TELEPHONE ENCOUNTER
08/13/24  1047  Called pr regarding she does not need to follow up this Thursday 8/15 after her echo and just needs to follow up with her yearly appointment next June. RN notified pt will be calling back with echo results.     ----- Message from Desire S sent at 8/13/2024 10:06 AM EDT -----  Regarding: Apt 08/15/2024  Patient is scheduled with Dr. Maya on Thursday, 08/15. She had an echo done today as ordered at last office visit; however, she did not need a followup after the echocardiogram; Dr. Maya said that the nurse would call with the results of her echo and she only needed to followup in a year, and she is scheduled for her annual followup in 06/2025.     Archie, please contact the patient to inform her that she does not need the apt on Thursday and Sarah will call with the results, and confirm her annual followup that is already scheduled in 06/2025.    Sarah, I am adding you to this conversation so it can be on your radar.     Thanks Ladies!    Phani

## 2024-08-14 ENCOUNTER — TELEPHONE (OUTPATIENT)
Dept: CARDIOLOGY | Facility: HOSPITAL | Age: 74
End: 2024-08-14
Payer: MEDICARE

## 2024-08-14 ENCOUNTER — OFFICE VISIT (OUTPATIENT)
Dept: WOUND CARE | Facility: CLINIC | Age: 74
End: 2024-08-14
Payer: MEDICARE

## 2024-08-14 LAB
EJECTION FRACTION: 35 %
GLOBAL LONGITUDINAL STRAIN: 9.9 %
LEFT ATRIUM VOLUME AREA LENGTH INDEX BSA: 20.8 ML/M2
LEFT VENTRICLE INTERNAL DIMENSION DIASTOLE: 5.08 CM (ref 3.5–6)
MITRAL VALVE E/A RATIO: 0.41
MITRAL VALVE E/E' RATIO: 8.73
RIGHT VENTRICLE PEAK SYSTOLIC PRESSURE: 18.6 MMHG

## 2024-08-14 PROCEDURE — 11043 DBRDMT MUSC&/FSCA 1ST 20/<: CPT

## 2024-08-14 NOTE — TELEPHONE ENCOUNTER
8/14/24  1034  Rn reviewed echo results and plan. Pt verbalized understanding. Pt does not want to follow with Dr. Corbett and prefers to follow up with the [facility] at this time. Dr. Maya recommends Dr. Corbett and Dr. Hamilton. Pt states she does not want to go to  and will call [facility] to follow with EP doctor there.     ----- Message from Ramez Maya sent at 8/14/2024  9:23 AM EDT -----  Let her know that her LVEF is still low at 35%. I had recommended seeing Dr. Corbett from EP for her Hx of afib and low EF. I would recommend that she shoud do that. Otherwise continue current meds and follow up in 1 year

## 2024-08-15 ENCOUNTER — APPOINTMENT (OUTPATIENT)
Dept: CARDIOLOGY | Facility: CLINIC | Age: 74
End: 2024-08-15
Payer: MEDICARE

## 2024-08-16 ENCOUNTER — APPOINTMENT (OUTPATIENT)
Dept: WOUND CARE | Facility: CLINIC | Age: 74
End: 2024-08-16
Payer: MEDICARE

## 2024-08-16 ENCOUNTER — APPOINTMENT (OUTPATIENT)
Dept: CARDIOLOGY | Facility: CLINIC | Age: 74
End: 2024-08-16
Payer: MEDICARE

## 2024-08-20 ENCOUNTER — HOSPITAL ENCOUNTER (OUTPATIENT)
Dept: RADIOLOGY | Facility: HOSPITAL | Age: 74
Discharge: HOME | End: 2024-08-20
Payer: MEDICARE

## 2024-08-20 DIAGNOSIS — L97.513 NON-PRESSURE CHRONIC ULCER OF OTHER PART OF RIGHT FOOT WITH NECROSIS OF MUSCLE (MULTI): ICD-10-CM

## 2024-08-20 DIAGNOSIS — M86.171 OTHER ACUTE OSTEOMYELITIS, RIGHT ANKLE AND FOOT (MULTI): ICD-10-CM

## 2024-08-20 PROCEDURE — 73718 MRI LOWER EXTREMITY W/O DYE: CPT | Mod: RT

## 2024-08-20 PROCEDURE — 73718 MRI LOWER EXTREMITY W/O DYE: CPT | Mod: RIGHT SIDE | Performed by: STUDENT IN AN ORGANIZED HEALTH CARE EDUCATION/TRAINING PROGRAM

## 2024-08-28 ENCOUNTER — OFFICE VISIT (OUTPATIENT)
Dept: WOUND CARE | Facility: CLINIC | Age: 74
End: 2024-08-28
Payer: MEDICARE

## 2024-08-28 PROCEDURE — 11043 DBRDMT MUSC&/FSCA 1ST 20/<: CPT

## 2024-08-31 DIAGNOSIS — I42.9 CARDIOMYOPATHY, UNSPECIFIED TYPE (MULTI): ICD-10-CM

## 2024-09-06 NOTE — TELEPHONE ENCOUNTER
9/6 - Patient called requesting assistance with scheduling an appt with Dr. Jeffers at [facility]. Stated that Dr. Maya referred her to Dr. Corbett originally, but she does not wish to proceed with defibrillator at this time and would prefer to see Dr. Jeffers with  and Dr. Lamb with [facility]. Patient also requested that most recent cardiac records be faxed to Dr. Lamb's office at [phone]. Records faxed, LVM for Dr. Jeffers's office to schedule visit.

## 2024-09-10 DIAGNOSIS — I42.9 CARDIOMYOPATHY, UNSPECIFIED TYPE (MULTI): ICD-10-CM

## 2024-09-10 RX ORDER — CARVEDILOL 3.12 MG/1
TABLET ORAL
Qty: 180 TABLET | Refills: 0 | OUTPATIENT
Start: 2024-09-10

## 2024-09-10 RX ORDER — CARVEDILOL 3.12 MG/1
3.12 TABLET ORAL
Qty: 180 TABLET | Refills: 3 | Status: SHIPPED | OUTPATIENT
Start: 2024-09-10 | End: 2025-09-10

## 2024-09-11 ENCOUNTER — OFFICE VISIT (OUTPATIENT)
Dept: WOUND CARE | Facility: CLINIC | Age: 74
End: 2024-09-11
Payer: MEDICARE

## 2024-09-11 PROCEDURE — 99213 OFFICE O/P EST LOW 20 MIN: CPT

## 2024-09-13 ENCOUNTER — TELEPHONE (OUTPATIENT)
Dept: CARDIOLOGY | Facility: HOSPITAL | Age: 74
End: 2024-09-13
Payer: MEDICARE

## 2024-09-13 NOTE — TELEPHONE ENCOUNTER
RN called to go over her recent phone call. Pt just wanted to update us on following up with a EP doctor at [facility].       Calling back from the call Sarah KHAN made to her. I let her know that Sarah would reach out when she can.    Dustin BENOIT

## 2024-09-17 ENCOUNTER — TELEMEDICINE (OUTPATIENT)
Dept: CARDIAC SURGERY | Facility: HOSPITAL | Age: 74
End: 2024-09-17
Payer: MEDICARE

## 2024-09-17 DIAGNOSIS — I34.0 MITRAL VALVE INSUFFICIENCY, UNSPECIFIED ETIOLOGY: ICD-10-CM

## 2024-09-17 DIAGNOSIS — I07.1 TRICUSPID VALVE INSUFFICIENCY, UNSPECIFIED ETIOLOGY: ICD-10-CM

## 2024-09-17 PROCEDURE — 99205 OFFICE O/P NEW HI 60 MIN: CPT | Performed by: THORACIC SURGERY (CARDIOTHORACIC VASCULAR SURGERY)

## 2024-09-17 PROCEDURE — 3044F HG A1C LEVEL LT 7.0%: CPT | Performed by: THORACIC SURGERY (CARDIOTHORACIC VASCULAR SURGERY)

## 2024-09-17 PROCEDURE — 1123F ACP DISCUSS/DSCN MKR DOCD: CPT | Performed by: THORACIC SURGERY (CARDIOTHORACIC VASCULAR SURGERY)

## 2024-09-17 PROCEDURE — 3061F NEG MICROALBUMINURIA REV: CPT | Performed by: THORACIC SURGERY (CARDIOTHORACIC VASCULAR SURGERY)

## 2024-09-17 PROCEDURE — 3050F LDL-C >= 130 MG/DL: CPT | Performed by: THORACIC SURGERY (CARDIOTHORACIC VASCULAR SURGERY)

## 2024-09-17 NOTE — H&P (VIEW-ONLY)
Chief Complaint  Cardiac disease assessment    HPI:   Ms. Kerry Spence is an 73 y.o. female, who is a patient of Dr.Jessica KIMBERLEY Jerry MD   she was self-referred, for a second opinion about her heart, although nothing specific was mention other than she needs to see an electrophysiologist.  She is followed by Dr. Maya who last saw her in June.    Kerry Spence was seen through a virtual visit.  She changed her appointment at the last minute and was seen through a video conference call.  She stated that she had no particular symptoms, however wanted a second opinion about her heart.  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  She states that she has a history of atrial fibrillation although she states that multiple tests have shown sinus rhythm.  She denies any shortness of breath, she denies chest pain or chest tightness.  She has no PND or orthopnea.    Her history is significant in the chart for paroxysmal atrial fibrillation with cardioversion in 2022.  She has a history of coronary artery disease with stenting in 2018 to both the LAD and the circumflex vessels.  She has a history of a previous CVA, type 2 diabetes, dyslipidemia and breast cancer in 2016 with chemotherapy.      Past Medical History:   Diagnosis Date    Acute candidiasis of vulva and vagina 10/08/2015    Vaginal candidiasis    CVA (cerebral vascular accident) (Multi) 01/20/2023    Generalized skin eruption due to drugs and medicaments taken internally 10/08/2015    Generalized skin eruption due to drugs and medicaments    Other conditions influencing health status 12/29/2020    History of cough    Other specified postprocedural states     History of removal of Port-a-Cath    Other specified soft tissue disorders 06/01/2015    Swelling of right upper extremity    Perforation of intestine (nontraumatic) (Multi) 12/15/2014    Bowel perforation    Personal history of malignant neoplasm of breast 04/16/2021    History of  malignant neoplasm of breast    Personal history of other diseases of the circulatory system 03/03/2014    History of hypertension    Personal history of other specified conditions 08/22/2014    History of insomnia       Past Surgical History:   Procedure Laterality Date    CHOLECYSTECTOMY  12/15/2014    Cholecystectomy    COLOSTOMY  12/15/2014    Colostomy    CT AORTA AND BILATERAL ILIOFEMORAL RUNOFF ANGIOGRAM W AND/OR WO IV CONTRAST  1/27/2023    CT AORTA AND BILATERAL ILIOFEMORAL RUNOFF ANGIOGRAM W AND/OR WO IV CONTRAST 1/27/2023 [facility]    MR HEAD ANGIO WO IV CONTRAST  10/11/2019    MR HEAD ANGIO WO IV CONTRAST 10/11/2019 [facility]GAC[facility]    MR NECK ANGIO WO IV CONTRAST  10/11/2019    MR NECK ANGIO WO IV CONTRAST 10/11/2019 [facility] CL[facility]ICAL [facility]GAC[facility]    OTHER SURGICAL HISTORY  12/30/2013    Modified Radical Mastectomy Right Breast    OTHER SURGICAL HISTORY  09/22/2017    Open Treatment Of Multiple Fractures Of The Radial Shaft    OTHER SURGICAL HISTORY  12/15/2014    Surgery Right Foot Amputation    TONSILLECTOMY  12/15/2014    Tonsillectomy       Family History   Problem Relation Name Age of Onset    COPD Mother      Aneurysm Father          AAA    Coronary artery disease Father      Hyperlipidemia Other Sibling        Social History     Socioeconomic History    Marital status: Significant Other     Spouse name: Not on file    Number of children: Not on file    Years of education: Not on file    Highest education level: Not on file   Occupational History    Not on file   Tobacco Use    Smoking status: Never    Smokeless tobacco: Never   Vaping Use    Vaping status: Never Used   Substance and Sexual Activity    Alcohol use: Not Currently     Comment: Occasional    Drug use: Never    Sexual activity: Not Currently   Other Topics Concern    Not on file   Social History Narrative    Not on file     Social Determinants of Health     Financial Resource Strain: Not on file   Food Insecurity: Not on file    Transportation Needs: Not on file   Physical Activity: Not on file   Stress: Not on file   Social Connections: Not on file   Intimate Partner Violence: Not on file   Housing Stability: Not on file       Allergies   Allergen Reactions    Sulfa (Sulfonamide Antibiotics) Unknown, Rash, Anaphylaxis and Swelling    Levofloxacin Unknown     Blood vessels in leg popped out    Lisinopril Cough    Lorazepam Unknown    Oxycodone-Acetaminophen Unknown     emesis    Prochlorperazine Nausea/vomiting    Vancomycin Unknown    Codeine Unknown, Rash and GI Upset       Outpatient Encounter Medications as of 9/17/2024   Medication Sig Dispense Refill    apixaban (Eliquis) 5 mg tablet Take 1 tablet (5 mg) by mouth 2 times a day. 180 tablet 1    carvedilol (Coreg) 3.125 mg tablet Take 1 tablet (3.125 mg) by mouth 2 times daily (morning and late afternoon). 180 tablet 3    flash glucose sensor kit (FreeStyle Nelson 2 Sensor) kit Inject 1 each under the skin every 14 (fourteen) days. 6 each 3    FreeStyle Nelson reader (FreeStyle Nelson 2 Challis) misc Inject under the skin in the morning, at noon, in the evening, and at bedtime. Use as instructed. Check blood sugars      gabapentin (Neurontin) 300 mg capsule Take 1 capsule (300 mg) by mouth every 4 hours. 540 capsule 1    naloxone (Narcan) 4 mg/0.1 mL nasal spray Administer into affected nostril(s) once daily as needed.      predniSONE (Deltasone) 20 mg tablet Take 3 tabs (60mg) daily for 3 days, then take 2 tabs (40mg) daily for 3 days, then take 1 tab (20mg) daily for 3 days. 18 tablet 0    pseudoephedrine ER (Sudafed 12 Hour) 120 mg 12 hr tablet Take 1 tablet (120 mg) by mouth every 12 hours if needed for congestion. 60 tablet 11    sacubitriL-valsartan (Entresto) 24-26 mg tablet Take 1 tablet by mouth 2 times a day. 180 tablet 0    tirzepatide (Mounjaro) 15 mg/0.5 mL pen injector Inject 15 mg under the skin 1 (one) time per week. 2 mL 11     No facility-administered encounter  medications on file as of 9/17/2024.       Physical Exam    Encounter Date: 06/21/24   ECG 12 lead (Clinic Performed)    Narrative    See scanned image        Lab Results   Component Value Date    WBC 6.7 07/01/2024    HGB 12.5 07/01/2024    HCT 36.6 07/01/2024    MCV 87 07/01/2024     07/01/2024     Lab Results   Component Value Date    GLUCOSE 84 07/01/2024    CALCIUM 9.3 07/01/2024     07/01/2024    K 4.4 07/01/2024    CO2 29 07/01/2024     07/01/2024    BUN 12 07/01/2024    CREATININE 0.88 07/01/2024     Transthoracic Echo (TTE) Limited    Result Date: 8/14/2024              [place] [address]      Phone [phone] Fax [phone] TRANSTHORACIC ECHOCARDIOGRAM REPORT Patient Name:      RONDA Colón Physician:    [id number] Maya MD Study Date:        8/13/2024            Ordering Provider:    Jayesh MAYA MRN/PID:           [id number]             Fellow: Accession#:        [id number]         Nurse: Date of Birth/Age: 1950 / 73      Sonographer:          Samia Schultz RDCS                    years Gender:            F                    Additional Staff: Height:            172.72 cm            Admit Date:           8/13/2024 Weight:            80.74 kg             Admission Status:     Outpatient BSA / BMI:         1.95 m2 / 27.06      Department Location:  [facility] Echo                    kg/m2                                      Lab Blood Pressure: 140 /80 mmHg Study Type:    TRANSTHORACIC ECHO (TTE) LIMITED Diagnosis/ICD: Cardiomyopathy, unspecified-I42.9; Abnormal result of                cardiovascular function study, unspecified-R94.30 Indication:    Cardiomyopathy CPT Codes:     Echo Limited-83048 Patient History: Pertinent History: Hyperlipidemia, CAD and CHF. Study Detail: The following Echo studies were performed: 2D, M-Mode, color flow                and Strain.  PHYSICIAN INTERPRETATION: Left Ventricle: The left ventricular systolic function is moderately decreased, with a visually estimated ejection fraction of 35%. There is global hypokinesis of the left ventricle with minor regional variations. The left ventricular cavity size was not assessed. The left ventricular septal wall thickness is normal. There is normal left ventricular posterior wall thickness. Left Ventricular Global Longitudinal Strain - 9.9 %. Spectral Doppler shows an impaired relaxation pattern of left ventricular diastolic filling. Strain values are abnormal, which imply subclinical myocardial dysfunction. Left Atrium: The left atrium was not assessed. Right Ventricle: The right ventricle was not assessed. Right ventricular systolic function not assessed. Right Atrium: The right atrium was not assessed. Aortic Valve: The aortic valve was not assessed. Aortic valve regurgitation was not assessed. Mitral Valve: The mitral valve was not assessed. Mitral valve regurgitation was not assessed. Tricuspid Valve: The tricuspid valve was not assessed. Tricuspid regurgitation was not assessed. Pulmonic Valve: The pulmonic valve was not assessed. The pulmonic valve regurgitation was not assessed. Pericardium: Pericardial effusion was not assessed. Aorta: The aortic root was not assessed.  CONCLUSIONS:  1. The left ventricular systolic function is moderately decreased, with a visually estimated ejection fraction of 35%.  2. There is global hypokinesis of the left ventricle with minor regional variations.  3. Spectral Doppler shows an impaired relaxation pattern of left ventricular diastolic filling. QUANTITATIVE DATA SUMMARY: 2D MEASUREMENTS:                           Normal Ranges: IVSd:          1.09 cm    (0.6-1.1cm) LVPWd:         1.07 cm    (0.6-1.1cm) LVIDd:         5.08 cm    (3.9-5.9cm) LVIDs:         4.60 cm LV Mass Index: 106.5 g/m2 LV % FS        9.4 % LA VOLUME:                                Normal Ranges: LA Vol A4C:        32.4 ml    (22+/-6mL/m2) LA Vol A2C:        40.3 ml LA Vol BP:         40.5 ml LA Vol Index A4C:  16.7ml/m2 LA Vol Index A2C:  20.7 ml/m2 LA Vol Index BP:   20.8 ml/m2 LA Area A4C:       12.8 cm2 LA Area A2C:       16.0 cm2 LA Major Axis A4C: 4.3 cm LA Major Axis A2C: 5.4 cm LA Volume Index:   20.8 ml/m2 LA Vol A4C:        30.4 ml LA Vol A2C:        38.3 ml LA Vol Index BSA:  17.7 ml/m2 LV SYSTOLIC FUNCTION BY 2D PLANIMETRY (MOD):                                        Normal Ranges: EF-Visual:                        35 % EF-Auto:                          33 % LV EF Reported:                   35 % Global Longitudinal Strain (GLS): 10 % LV DIASTOLIC FUNCTION:                         Normal Ranges: MV Peak E:    0.41 m/s  (0.7-1.2 m/s) MV Peak A:    1.00 m/s  (0.42-0.7 m/s) E/A Ratio:    0.41      (1.0-2.2) MV e'         0.047 m/s (>8.0) MV lateral e' 0.03 m/s MV medial e'  0.06 m/s E/e' Ratio:   8.73      (<8.0) MITRAL VALVE:                 Normal Ranges: MV DT: 181 msec (150-240msec) TRICUSPID VALVE/RVSP:                             Normal Ranges: Peak TR Velocity: 1.97 m/s RV Syst Pressure: 18.6 mmHg (< 30mmHg)  [id number] Maya MD Electronically signed on 8/14/2024 at 7:58:00 AM  ** Final **     Transthoracic echo (TTE) complete    Result Date: 7/11/2024              [place] [address]      Phone [phone] Fax [phone] TRANSTHORACIC ECHOCARDIOGRAM REPORT Patient Name:      RONDA MEHTA      Katarzyna Physician:    [id number] Acosta Mccormack DO Study Date:        7/11/2024            Ordering Provider:    [id number] GELACIO MAYA MRN/PID:           [id number]             Fellow: Accession#:        [id number]         Nurse: Date of Birth/Age: 1950 / 73      Sonographer:          Cindy Charles RDCS                    years Gender:            F                     Additional Staff: Height:            172.72 cm            Admit Date:           7/11/2024 Weight:            84.82 kg             Admission Status:     Outpatient BSA / BMI:         1.99 m2 / 28.43      Department Location:  [facility] Echo                    kg/m2                                      Lab Blood Pressure: 140 /80 mmHg Study Type:    TRANSTHORACIC ECHO (TTE) COMPLETE Diagnosis/ICD: Atherosclerotic heart disease of native coronary artery without                angina pectoris-I25.10; Paroxysmal atrial fibrillation-I48.0 Indication:    CAD, A-fib CPT Codes:     Echo Complete w Full Doppler-[id number]  Study Detail: The following Echo studies were performed: 2D, M-Mode, Doppler,               color flow and Strain.  PHYSICIAN INTERPRETATION: Left Ventricle: The left ventricular systolic function is moderately decreased, with a visually estimated ejection fraction of 30-35%. There is global hypokinesis of the left ventricle with minor regional variations. The left ventricular cavity size is upper limits of normal. The left ventricular septal wall thickness is mildly increased. Left Ventricular Global Longitudinal Strain - 9.6 %. Spectral Doppler shows a pseudonormal pattern of left ventricular diastolic filling. Left Atrium: The left atrium is mildly dilated. Right Ventricle: The right ventricle is normal in size. There is normal right ventricular global systolic function. Right Atrium: The right atrium is normal in size. Aortic Valve: The aortic valve is trileaflet. There is no evidence of aortic valve regurgitation. Mitral Valve: The mitral valve is mildly thickened. There is mild mitral valve regurgitation. Tricuspid Valve: The tricuspid valve is structurally normal. There is mild tricuspid regurgitation. The Doppler estimated RVSP is slightly elevated at 37.3 mmHg. Pulmonic Valve: The pulmonic valve is structurally normal. There is trace pulmonic valve regurgitation. Pericardium: There is no pericardial  effusion noted. Aorta: The aortic root is normal. Systemic Veins: The inferior vena cava appears to be of normal size.  CONCLUSIONS:  1. The left ventricular systolic function is moderately decreased, with a visually estimated ejection fraction of 30-35%.  2. There is global hypokinesis of the left ventricle with minor regional variations.  3. Spectral Doppler shows a pseudonormal pattern of left ventricular diastolic filling.  4. There is normal right ventricular global systolic function.  5. Slightly elevated RVSP. QUANTITATIVE DATA SUMMARY: 2D MEASUREMENTS:                           Normal Ranges: IVSd:          1.32 cm    (0.6-1.1cm) LVPWd:         1.12 cm    (0.6-1.1cm) LVIDd:         5.27 cm    (3.9-5.9cm) LVIDs:         3.00 cm LV Mass Index: 130.9 g/m2 LV % FS        43.2 % LA VOLUME:                               Normal Ranges: LA Vol A4C:        79.8 ml    (22+/-6mL/m2) LA Vol A2C:        53.8 ml LA Vol BP:         68.0 ml LA Vol Index A4C:  40.2 ml/m2 LA Vol Index A2C:  27.1 ml/m2 LA Vol Index BP:   34.2 ml/m2 LA Area A4C:       22.3 cm2 LA Area A2C:       19.0 cm2 LA Major Axis A4C: 5.3 cm LA Major Axis A2C: 5.7 cm LA Volume Index:   34.1 ml/m2 LA Vol A4C:        72.6 ml LA Vol A2C:        50.0 ml RA VOLUME BY A/L METHOD:                       Normal Ranges: RA Area A4C: 15.0 cm2 M-MODE MEASUREMENTS:                  Normal Ranges: Ao Root: 2.70 cm (2.0-3.7cm) AoV Exc: 2.00 cm (1.5-2.5cm) LAs:     3.37 cm (2.7-4.0cm) AORTA MEASUREMENTS:                      Normal Ranges: AoV Exc:     2.00 cm (1.5-2.5cm) Ao Sinus, d: 3.00 cm (2.1-3.5cm) Ao STJ, d:   2.50 cm (1.7-3.4cm) Asc Ao, d:   3.10 cm (2.1-3.4cm) LV SYSTOLIC FUNCTION BY 2D PLANIMETRY (MOD):                                        Normal Ranges: EF-A4C View:                      31 % (>=55%) EF-A2C View:                      39 % EF-Biplane:                       35 % EF-Visual:                        33 % LV EF Reported:                   33 % Global  Longitudinal Strain (GLS): 10 % LV DIASTOLIC FUNCTION:                           Normal Ranges: MV Peak E:    0.92 m/s    (0.7-1.2 m/s) MV Peak A:    0.61 m/s    (0.42-0.7 m/s) E/A Ratio:    1.50        (1.0-2.2) MV e'         0.048 m/s   (>8.0) MV lateral e' 0.05 m/s MV medial e'  0.05 m/s MV A Dur:     137.01 msec E/e' Ratio:   19.14       (<8.0) MITRAL VALVE:                 Normal Ranges: MV DT: 204 msec (150-240msec) MITRAL INSUFFICIENCY:                      Normal Ranges: MR VTI:  179.65 cm MR Vmax: 476.79 cm/s AORTIC VALVE:                        Normal Ranges: LVOT Diameter: 2.02 cm (1.8-2.4cm)  RIGHT VENTRICLE: RV Basal 2.69 cm RV Mid   1.90 cm RV Major 6.9 cm TAPSE:   17.7 mm RV s'    0.15 m/s TRICUSPID VALVE/RVSP:                             Normal Ranges: Peak TR Velocity: 2.93 m/s RV Syst Pressure: 37.3 mmHg (< 30mmHg) IVC Diam:         1.90 cm TV e'             0.1 m/s AORTA: Asc Ao Diam 3.11 cm  [id number] Acosta Mccormack DO Electronically signed on 2024 at 10:29:31 AM  ** Final **       Assessment and Plan:   Ms. Kerry Spence is an 73 y.o. female who has been self-referred, to discuss her cardiac situation.      I had a chance to review all available, pertinent investigations.  My interpretation of these studies is as follows:    The echocardiograms are interesting.  They demonstrate a significant decrease in her ejection fraction dating from  to present.  Previously, her ejection fraction was normal.  In the last 2 years, this is decreased to 30 to 35%, without any obvious cause.  There is no significant valvular abnormality.  On her most recent echo there is mild mitral regurgitation and mild tricuspid regurgitation.    Review of her coronary angiograms dated back to 2018.  At that time, she had stenting of both the LAD and circumflex vessels.    I can see from the records that she is scheduled to see electrophysiology at the [facility].    My assessment is the followin.  She has had  a drop in her ejection fraction which is substantial, although she remains asymptomatic, and there is no clear explanation.  Possibilities include progression of coronary artery disease, chemotherapy mediated cardiomyopathy, or tachycardia mediated cardiomyopathy.  Having said that, her heart rhythms do not suggest a history of persistent tachyarrhythmias.  2.  I will communicate with her cardiologist, Dr. Maya.  It may be reasonable to evaluate the potential causes of her cardiomyopathy, including a repeat coronary angiogram.  I could not find one in the system since 2018, at which time she received stents to the LAD and circumflex.  3.  There is no indication for cardiac surgery involvement at this time.  4.  She describes a history of bradycardia and is scheduled to see EP at the previous clinic.  Ideally she should see one of the electrophysiologists within the  system.

## 2024-09-17 NOTE — PROGRESS NOTES
Chief Complaint  Cardiac disease assessment    HPI:   Ms. Kerry Spence is an 73 y.o. female, who is a patient of Dr.Jessica KIMBERLEY Jerry MD   she was self-referred, for a second opinion about her heart, although nothing specific was mention other than she needs to see an electrophysiologist.  She is followed by Dr. Maya who last saw her in June.    Kerry Spence was seen through a virtual visit.  She changed her appointment at the last minute and was seen through a video conference call.  She stated that she had no particular symptoms, however wanted a second opinion about her heart.  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  She states that she has a history of atrial fibrillation although she states that multiple tests have shown sinus rhythm.  She denies any shortness of breath, she denies chest pain or chest tightness.  She has no PND or orthopnea.    Her history is significant in the chart for paroxysmal atrial fibrillation with cardioversion in 2022.  She has a history of coronary artery disease with stenting in 2018 to both the LAD and the circumflex vessels.  She has a history of a previous CVA, type 2 diabetes, dyslipidemia and breast cancer in 2016 with chemotherapy.      Past Medical History:   Diagnosis Date    Acute candidiasis of vulva and vagina 10/08/2015    Vaginal candidiasis    CVA (cerebral vascular accident) (Multi) 01/20/2023    Generalized skin eruption due to drugs and medicaments taken internally 10/08/2015    Generalized skin eruption due to drugs and medicaments    Other conditions influencing health status 12/29/2020    History of cough    Other specified postprocedural states     History of removal of Port-a-Cath    Other specified soft tissue disorders 06/01/2015    Swelling of right upper extremity    Perforation of intestine (nontraumatic) (Multi) 12/15/2014    Bowel perforation    Personal history of malignant neoplasm of breast 04/16/2021    History of  malignant neoplasm of breast    Personal history of other diseases of the circulatory system 03/03/2014    History of hypertension    Personal history of other specified conditions 08/22/2014    History of insomnia       Past Surgical History:   Procedure Laterality Date    CHOLECYSTECTOMY  12/15/2014    Cholecystectomy    COLOSTOMY  12/15/2014    Colostomy    CT AORTA AND BILATERAL ILIOFEMORAL RUNOFF ANGIOGRAM W AND/OR WO IV CONTRAST  1/27/2023    CT AORTA AND BILATERAL ILIOFEMORAL RUNOFF ANGIOGRAM W AND/OR WO IV CONTRAST 1/27/2023 DOCTOR OFFICE [facility]GAC[facility]    MR HEAD ANGIO WO IV CONTRAST  10/11/2019    MR HEAD ANGIO WO IV CONTRAST 10/11/2019 [facility] CL[facility]ICAL LEGAC[facility]    MR NECK ANGIO WO IV CONTRAST  10/11/2019    MR NECK ANGIO WO IV CONTRAST 10/11/2019 [facility] CLINICAL LEGACY    OTHER SURGICAL HISTORY  12/30/2013    Modified Radical Mastectomy Right Breast    OTHER SURGICAL HISTORY  09/22/2017    Open Treatment Of Multiple Fractures Of The Radial Shaft    OTHER SURGICAL HISTORY  12/15/2014    Surgery Right Foot Amputation    TONSILLECTOMY  12/15/2014    Tonsillectomy       Family History   Problem Relation Name Age of Onset    COPD Mother      Aneurysm Father          AAA    Coronary artery disease Father      Hyperlipidemia Other Sibling        Social History     Socioeconomic History    Marital status: Significant Other     Spouse name: Not on file    Number of children: Not on file    Years of education: Not on file    Highest education level: Not on file   Occupational History    Not on file   Tobacco Use    Smoking status: Never    Smokeless tobacco: Never   Vaping Use    Vaping status: Never Used   Substance and Sexual Activity    Alcohol use: Not Currently     Comment: Occasional    Drug use: Never    Sexual activity: Not Currently   Other Topics Concern    Not on file   Social History Narrative    Not on file     Social Determinants of Health     Financial Resource Strain: Not on file   Food Insecurity: Not on file    Transportation Needs: Not on file   Physical Activity: Not on file   Stress: Not on file   Social Connections: Not on file   Intimate Partner Violence: Not on file   Housing Stability: Not on file       Allergies   Allergen Reactions    Sulfa (Sulfonamide Antibiotics) Unknown, Rash, Anaphylaxis and Swelling    Levofloxacin Unknown     Blood vessels in leg popped out    Lisinopril Cough    Lorazepam Unknown    Oxycodone-Acetaminophen Unknown     emesis    Prochlorperazine Nausea/vomiting    Vancomycin Unknown    Codeine Unknown, Rash and GI Upset       Outpatient Encounter Medications as of 9/17/2024   Medication Sig Dispense Refill    apixaban (Eliquis) 5 mg tablet Take 1 tablet (5 mg) by mouth 2 times a day. 180 tablet 1    carvedilol (Coreg) 3.125 mg tablet Take 1 tablet (3.125 mg) by mouth 2 times daily (morning and late afternoon). 180 tablet 3    flash glucose sensor kit (FreeStyle Nelson 2 Sensor) kit Inject 1 each under the skin every 14 (fourteen) days. 6 each 3    FreeStyle Nelson reader (FreeStyle Nelson 2 [place]) misc Inject under the skin in the morning, at noon, in the evening, and at bedtime. Use as instructed. Check blood sugars      gabapentin (Neurontin) 300 mg capsule Take 1 capsule (300 mg) by mouth every 4 hours. 540 capsule 1    naloxone (Narcan) 4 mg/0.1 mL nasal spray Administer into affected nostril(s) once daily as needed.      predniSONE (Deltasone) 20 mg tablet Take 3 tabs (60mg) daily for 3 days, then take 2 tabs (40mg) daily for 3 days, then take 1 tab (20mg) daily for 3 days. 18 tablet 0    pseudoephedrine ER (Sudafed 12 Hour) 120 mg 12 hr tablet Take 1 tablet (120 mg) by mouth every 12 hours if needed for congestion. 60 tablet 11    sacubitriL-valsartan (Entresto) 24-26 mg tablet Take 1 tablet by mouth 2 times a day. 180 tablet 0    tirzepatide (Mounjaro) 15 mg/0.5 mL pen injector Inject 15 mg under the skin 1 (one) time per week. 2 mL 11     No facility-administered encounter  medications on file as of 9/17/2024.       Physical Exam    Encounter Date: 06/21/24   ECG 12 lead (Clinic Performed)    Narrative    See scanned image        Lab Results   Component Value Date    WBC 6.7 07/01/2024    HGB 12.5 07/01/2024    HCT 36.6 07/01/2024    MCV 87 07/01/2024     07/01/2024     Lab Results   Component Value Date    GLUCOSE 84 07/01/2024    CALCIUM 9.3 07/01/2024     07/01/2024    K 4.4 07/01/2024    CO2 29 07/01/2024     07/01/2024    BUN 12 07/01/2024    CREATININE 0.88 07/01/2024     Transthoracic Echo (TTE) Limited    Result Date: 8/14/2024              [place] [address]      Phone [phone] Fax [phone] TRANSTHORACIC ECHOCARDIOGRAM REPORT Patient Name:      RONDA Colón Physician:    [id number] Maya MD Study Date:        8/13/2024            Ordering Provider:    Jayesh MAYA MRN/PID:           [id number]             Fellow: Accession#:        [id number]         Nurse: Date of Birth/Age: 1950 / 73      Sonographer:          Samia Schultz RDCS                    years Gender:            F                    Additional Staff: Height:            172.72 cm            Admit Date:           8/13/2024 Weight:            80.74 kg             Admission Status:     Outpatient BSA / BMI:         1.95 m2 / 27.06      Department Location:  [facility] Echo                    kg/m2                                      Lab Blood Pressure: 140 /80 mmHg Study Type:    TRANSTHORACIC ECHO (TTE) LIMITED Diagnosis/ICD: Cardiomyopathy, unspecified-I42.9; Abnormal result of                cardiovascular function study, unspecified-R94.30 Indication:    Cardiomyopathy CPT Codes:     Echo Limited-13295 Patient History: Pertinent History: Hyperlipidemia, CAD and CHF. Study Detail: The following Echo studies were performed: 2D, M-Mode, color flow                and Strain.  PHYSICIAN INTERPRETATION: Left Ventricle: The left ventricular systolic function is moderately decreased, with a visually estimated ejection fraction of 35%. There is global hypokinesis of the left ventricle with minor regional variations. The left ventricular cavity size was not assessed. The left ventricular septal wall thickness is normal. There is normal left ventricular posterior wall thickness. Left Ventricular Global Longitudinal Strain - 9.9 %. Spectral Doppler shows an impaired relaxation pattern of left ventricular diastolic filling. Strain values are abnormal, which imply subclinical myocardial dysfunction. Left Atrium: The left atrium was not assessed. Right Ventricle: The right ventricle was not assessed. Right ventricular systolic function not assessed. Right Atrium: The right atrium was not assessed. Aortic Valve: The aortic valve was not assessed. Aortic valve regurgitation was not assessed. Mitral Valve: The mitral valve was not assessed. Mitral valve regurgitation was not assessed. Tricuspid Valve: The tricuspid valve was not assessed. Tricuspid regurgitation was not assessed. Pulmonic Valve: The pulmonic valve was not assessed. The pulmonic valve regurgitation was not assessed. Pericardium: Pericardial effusion was not assessed. Aorta: The aortic root was not assessed.  CONCLUSIONS:  1. The left ventricular systolic function is moderately decreased, with a visually estimated ejection fraction of 35%.  2. There is global hypokinesis of the left ventricle with minor regional variations.  3. Spectral Doppler shows an impaired relaxation pattern of left ventricular diastolic filling. QUANTITATIVE DATA SUMMARY: 2D MEASUREMENTS:                           Normal Ranges: IVSd:          1.09 cm    (0.6-1.1cm) LVPWd:         1.07 cm    (0.6-1.1cm) LVIDd:         5.08 cm    (3.9-5.9cm) LVIDs:         4.60 cm LV Mass Index: 106.5 g/m2 LV % FS        9.4 % LA VOLUME:                                Normal Ranges: LA Vol A4C:        32.4 ml    (22+/-6mL/m2) LA Vol A2C:        40.3 ml LA Vol BP:         40.5 ml LA Vol Index A4C:  16.7ml/m2 LA Vol Index A2C:  20.7 ml/m2 LA Vol Index BP:   20.8 ml/m2 LA Area A4C:       12.8 cm2 LA Area A2C:       16.0 cm2 LA Major Axis A4C: 4.3 cm LA Major Axis A2C: 5.4 cm LA Volume Index:   20.8 ml/m2 LA Vol A4C:        30.4 ml LA Vol A2C:        38.3 ml LA Vol Index BSA:  17.7 ml/m2 LV SYSTOLIC FUNCTION BY 2D PLANIMETRY (MOD):                                        Normal Ranges: EF-Visual:                        35 % EF-Auto:                          33 % LV EF Reported:                   35 % Global Longitudinal Strain (GLS): 10 % LV DIASTOLIC FUNCTION:                         Normal Ranges: MV Peak E:    0.41 m/s  (0.7-1.2 m/s) MV Peak A:    1.00 m/s  (0.42-0.7 m/s) E/A Ratio:    0.41      (1.0-2.2) MV e'         0.047 m/s (>8.0) MV lateral e' 0.03 m/s MV medial e'  0.06 m/s E/e' Ratio:   8.73      (<8.0) MITRAL VALVE:                 Normal Ranges: MV DT: 181 msec (150-240msec) TRICUSPID VALVE/RVSP:                             Normal Ranges: Peak TR Velocity: 1.97 m/s RV Syst Pressure: 18.6 mmHg (< 30mmHg)  [id number] Maya MD Electronically signed on 8/14/2024 at 7:58:00 AM  ** Final **     Transthoracic echo (TTE) complete    Result Date: 7/11/2024              [place] [address]      Phone [phone] Fax [phone] TRANSTHORACIC ECHOCARDIOGRAM REPORT Patient Name:      RONDA MEHTA      Katarzyna Physician:    [id number] Acosta Mccormack DO Study Date:        7/11/2024            Ordering Provider:    [id number] GELACIO MAYA MRN/PID:           [id number]             Fellow: Accession#:        [id number]         Nurse: Date of Birth/Age: 1950 / 73      Sonographer:          Cindy Charles RDCS                    years Gender:            F                     Additional Staff: Height:            172.72 cm            Admit Date:           7/11/2024 Weight:            84.82 kg             Admission Status:     Outpatient BSA / BMI:         1.99 m2 / 28.43      Department Location:  [facility] Echo                    kg/m2                                      Lab Blood Pressure: 140 /80 mmHg Study Type:    TRANSTHORACIC ECHO (TTE) COMPLETE Diagnosis/ICD: Atherosclerotic heart disease of native coronary artery without                angina pectoris-I25.10; Paroxysmal atrial fibrillation-I48.0 Indication:    CAD, A-fib CPT Codes:     Echo Complete w Full Doppler-29948  Study Detail: The following Echo studies were performed: 2D, M-Mode, Doppler,               color flow and Strain.  PHYSICIAN INTERPRETATION: Left Ventricle: The left ventricular systolic function is moderately decreased, with a visually estimated ejection fraction of 30-35%. There is global hypokinesis of the left ventricle with minor regional variations. The left ventricular cavity size is upper limits of normal. The left ventricular septal wall thickness is mildly increased. Left Ventricular Global Longitudinal Strain - 9.6 %. Spectral Doppler shows a pseudonormal pattern of left ventricular diastolic filling. Left Atrium: The left atrium is mildly dilated. Right Ventricle: The right ventricle is normal in size. There is normal right ventricular global systolic function. Right Atrium: The right atrium is normal in size. Aortic Valve: The aortic valve is trileaflet. There is no evidence of aortic valve regurgitation. Mitral Valve: The mitral valve is mildly thickened. There is mild mitral valve regurgitation. Tricuspid Valve: The tricuspid valve is structurally normal. There is mild tricuspid regurgitation. The Doppler estimated RVSP is slightly elevated at 37.3 mmHg. Pulmonic Valve: The pulmonic valve is structurally normal. There is trace pulmonic valve regurgitation. Pericardium: There is no pericardial  effusion noted. Aorta: The aortic root is normal. Systemic Veins: The inferior vena cava appears to be of normal size.  CONCLUSIONS:  1. The left ventricular systolic function is moderately decreased, with a visually estimated ejection fraction of 30-35%.  2. There is global hypokinesis of the left ventricle with minor regional variations.  3. Spectral Doppler shows a pseudonormal pattern of left ventricular diastolic filling.  4. There is normal right ventricular global systolic function.  5. Slightly elevated RVSP. QUANTITATIVE DATA SUMMARY: 2D MEASUREMENTS:                           Normal Ranges: IVSd:          1.32 cm    (0.6-1.1cm) LVPWd:         1.12 cm    (0.6-1.1cm) LVIDd:         5.27 cm    (3.9-5.9cm) LVIDs:         3.00 cm LV Mass Index: 130.9 g/m2 LV % FS        43.2 % LA VOLUME:                               Normal Ranges: LA Vol A4C:        79.8 ml    (22+/-6mL/m2) LA Vol A2C:        53.8 ml LA Vol BP:         68.0 ml LA Vol Index A4C:  40.2 ml/m2 LA Vol Index A2C:  27.1 ml/m2 LA Vol Index BP:   34.2 ml/m2 LA Area A4C:       22.3 cm2 LA Area A2C:       19.0 cm2 LA Major Axis A4C: 5.3 cm LA Major Axis A2C: 5.7 cm LA Volume Index:   34.1 ml/m2 LA Vol A4C:        72.6 ml LA Vol A2C:        50.0 ml RA VOLUME BY A/L METHOD:                       Normal Ranges: RA Area A4C: 15.0 cm2 M-MODE MEASUREMENTS:                  Normal Ranges: Ao Root: 2.70 cm (2.0-3.7cm) AoV Exc: 2.00 cm (1.5-2.5cm) LAs:     3.37 cm (2.7-4.0cm) AORTA MEASUREMENTS:                      Normal Ranges: AoV Exc:     2.00 cm (1.5-2.5cm) Ao Sinus, d: 3.00 cm (2.1-3.5cm) Ao STJ, d:   2.50 cm (1.7-3.4cm) Asc Ao, d:   3.10 cm (2.1-3.4cm) LV SYSTOLIC FUNCTION BY 2D PLANIMETRY (MOD):                                        Normal Ranges: EF-A4C View:                      31 % (>=55%) EF-A2C View:                      39 % EF-Biplane:                       35 % EF-Visual:                        33 % LV EF Reported:                   33 % Global  Longitudinal Strain (GLS): 10 % LV DIASTOLIC FUNCTION:                           Normal Ranges: MV Peak E:    0.92 m/s    (0.7-1.2 m/s) MV Peak A:    0.61 m/s    (0.42-0.7 m/s) E/A Ratio:    1.50        (1.0-2.2) MV e'         0.048 m/s   (>8.0) MV lateral e' 0.05 m/s MV medial e'  0.05 m/s MV A Dur:     137.01 msec E/e' Ratio:   19.14       (<8.0) MITRAL VALVE:                 Normal Ranges: MV DT: 204 msec (150-240msec) MITRAL INSUFFICIENCY:                      Normal Ranges: MR VTI:  179.65 cm MR Vmax: 476.79 cm/s AORTIC VALVE:                        Normal Ranges: LVOT Diameter: 2.02 cm (1.8-2.4cm)  RIGHT VENTRICLE: RV Basal 2.69 cm RV Mid   1.90 cm RV Major 6.9 cm TAPSE:   17.7 mm RV s'    0.15 m/s TRICUSPID VALVE/RVSP:                             Normal Ranges: Peak TR Velocity: 2.93 m/s RV Syst Pressure: 37.3 mmHg (< 30mmHg) IVC Diam:         1.90 cm TV e'             0.1 m/s AORTA: Asc Ao Diam 3.11 cm  [id number] Acosta Mccormack DO Electronically signed on 2024 at 10:29:31 AM  ** Final **       Assessment and Plan:   Ms. Kerry Spence is an 73 y.o. female who has been self-referred, to discuss her cardiac situation.      I had a chance to review all available, pertinent investigations.  My interpretation of these studies is as follows:    The echocardiograms are interesting.  They demonstrate a significant decrease in her ejection fraction dating from  to present.  Previously, her ejection fraction was normal.  In the last 2 years, this is decreased to 30 to 35%, without any obvious cause.  There is no significant valvular abnormality.  On her most recent echo there is mild mitral regurgitation and mild tricuspid regurgitation.    Review of her coronary angiograms dated back to 2018.  At that time, she had stenting of both the LAD and circumflex vessels.    I can see from the records that she is scheduled to see electrophysiology at the [facility].    My assessment is the followin.  She has had  a drop in her ejection fraction which is substantial, although she remains asymptomatic, and there is no clear explanation.  Possibilities include progression of coronary artery disease, chemotherapy mediated cardiomyopathy, or tachycardia mediated cardiomyopathy.  Having said that, her heart rhythms do not suggest a history of persistent tachyarrhythmias.  2.  I will communicate with her cardiologist, Dr. Maya.  It may be reasonable to evaluate the potential causes of her cardiomyopathy, including a repeat coronary angiogram.  I could not find one in the system since 2018, at which time she received stents to the LAD and circumflex.  3.  There is no indication for cardiac surgery involvement at this time.  4.  She describes a history of bradycardia and is scheduled to see EP at the previous clinic.  Ideally she should see one of the electrophysiologists within the  system.

## 2024-09-18 DIAGNOSIS — I25.10 CVD (CARDIOVASCULAR DISEASE): ICD-10-CM

## 2024-09-18 DIAGNOSIS — R94.30 CARDIAC LEFT VENTRICULAR EJECTION FRACTION 30-35 PERCENT: ICD-10-CM

## 2024-09-18 DIAGNOSIS — I25.10 CAD IN NATIVE ARTERY: Primary | ICD-10-CM

## 2024-09-18 NOTE — TELEPHONE ENCOUNTER
Called cath instructions to patient including review of date and arrival time.  Verified no need for dye prep.  Labs reviewed.  Nothing to eat or drink after midnight except Coreg with a sip of water the morning of the cath.  Please hold your Eliquis 2 days prior to procedure and day of procedure. You will need a .  Bring your ID, insurance cards and either a perfect list of the medications you are swallowing or the medications in original bottles.  Wear comfortable clothing.  There is a possibility of staying over night for observation so pack a bag with necessities for that.  Patient correctly repeated instructions, verbalized understanding and is agreeable to the plan.

## 2024-09-19 ENCOUNTER — TELEPHONE (OUTPATIENT)
Dept: CARDIOLOGY | Facility: HOSPITAL | Age: 74
End: 2024-09-19
Payer: MEDICARE

## 2024-09-19 PROBLEM — R93.1 CARDIAC LEFT VENTRICULAR EJECTION FRACTION 30-35 PERCENT: Status: ACTIVE | Noted: 2024-09-18

## 2024-09-19 PROBLEM — R94.30 CARDIAC LEFT VENTRICULAR EJECTION FRACTION 30-35 PERCENT: Status: ACTIVE | Noted: 2024-09-18

## 2024-09-19 NOTE — TELEPHONE ENCOUNTER
RN called to verify that patient got her lab work done and verified instructions were sent to her [facility].    Patient is getting a [facility] done 9/30 and arrival time is 8:30 and start time 9:30. Please call with instructions to the patient     She is aware she needs blood work before hand.  She verbalized understanding that a nurse will reach out and ageed with everything.

## 2024-09-25 ENCOUNTER — OFFICE VISIT (OUTPATIENT)
Dept: WOUND CARE | Facility: CLINIC | Age: 74
End: 2024-09-25
Payer: MEDICARE

## 2024-09-25 PROCEDURE — 99213 OFFICE O/P EST LOW 20 MIN: CPT

## 2024-09-27 ENCOUNTER — LAB (OUTPATIENT)
Dept: LAB | Facility: LAB | Age: 74
End: 2024-09-27
Payer: MEDICARE

## 2024-09-27 DIAGNOSIS — I25.10 CAD IN NATIVE ARTERY: ICD-10-CM

## 2024-09-27 DIAGNOSIS — I25.10 CVD (CARDIOVASCULAR DISEASE): ICD-10-CM

## 2024-09-27 DIAGNOSIS — R94.30 CARDIAC LEFT VENTRICULAR EJECTION FRACTION 30-35 PERCENT: ICD-10-CM

## 2024-09-27 DIAGNOSIS — I48.0 PAROXYSMAL ATRIAL FIBRILLATION (MULTI): ICD-10-CM

## 2024-09-27 LAB
ALBUMIN SERPL BCP-MCNC: 4 G/DL (ref 3.4–5)
ALP SERPL-CCNC: 105 U/L (ref 33–136)
ALT SERPL W P-5'-P-CCNC: 12 U/L (ref 7–45)
ANION GAP SERPL CALC-SCNC: 9 MMOL/L (ref 10–20)
AST SERPL W P-5'-P-CCNC: 20 U/L (ref 9–39)
BILIRUB SERPL-MCNC: 0.5 MG/DL (ref 0–1.2)
BUN SERPL-MCNC: 10 MG/DL (ref 6–23)
CALCIUM SERPL-MCNC: 8.6 MG/DL (ref 8.6–10.3)
CHLORIDE SERPL-SCNC: 102 MMOL/L (ref 98–107)
CHOLEST SERPL-MCNC: 204 MG/DL (ref 0–199)
CHOLESTEROL/HDL RATIO: 4.1
CO2 SERPL-SCNC: 28 MMOL/L (ref 21–32)
CREAT SERPL-MCNC: 0.92 MG/DL (ref 0.5–1.05)
EGFRCR SERPLBLD CKD-EPI 2021: 65 ML/MIN/1.73M*2
ERYTHROCYTE [DISTWIDTH] IN BLOOD BY AUTOMATED COUNT: 13.9 % (ref 11.5–14.5)
GLUCOSE SERPL-MCNC: 88 MG/DL (ref 74–99)
HCT VFR BLD AUTO: 36.3 % (ref 36–46)
HDLC SERPL-MCNC: 49.2 MG/DL
HGB BLD-MCNC: 12 G/DL (ref 12–16)
LDLC SERPL CALC-MCNC: 135 MG/DL
MCH RBC QN AUTO: 28.8 PG (ref 26–34)
MCHC RBC AUTO-ENTMCNC: 33.1 G/DL (ref 32–36)
MCV RBC AUTO: 87 FL (ref 80–100)
NON HDL CHOLESTEROL: 155 MG/DL (ref 0–149)
NRBC BLD-RTO: 0 /100 WBCS (ref 0–0)
PLATELET # BLD AUTO: 316 X10*3/UL (ref 150–450)
POTASSIUM SERPL-SCNC: 4.3 MMOL/L (ref 3.5–5.3)
PROT SERPL-MCNC: 6.2 G/DL (ref 6.4–8.2)
RBC # BLD AUTO: 4.16 X10*6/UL (ref 4–5.2)
SODIUM SERPL-SCNC: 135 MMOL/L (ref 136–145)
TRIGL SERPL-MCNC: 97 MG/DL (ref 0–149)
VLDL: 19 MG/DL (ref 0–40)
WBC # BLD AUTO: 5.5 X10*3/UL (ref 4.4–11.3)

## 2024-09-27 PROCEDURE — 80061 LIPID PANEL: CPT

## 2024-09-27 PROCEDURE — 80053 COMPREHEN METABOLIC PANEL: CPT

## 2024-09-27 PROCEDURE — 36415 COLL VENOUS BLD VENIPUNCTURE: CPT

## 2024-09-27 PROCEDURE — 85027 COMPLETE CBC AUTOMATED: CPT

## 2024-09-30 ENCOUNTER — HOSPITAL ENCOUNTER (OUTPATIENT)
Facility: HOSPITAL | Age: 74
Setting detail: OUTPATIENT SURGERY
Discharge: HOME | End: 2024-09-30
Attending: INTERNAL MEDICINE | Admitting: INTERNAL MEDICINE
Payer: MEDICARE

## 2024-09-30 VITALS
DIASTOLIC BLOOD PRESSURE: 62 MMHG | OXYGEN SATURATION: 97 % | SYSTOLIC BLOOD PRESSURE: 127 MMHG | HEART RATE: 65 BPM | TEMPERATURE: 97.6 F | RESPIRATION RATE: 18 BRPM

## 2024-09-30 DIAGNOSIS — I25.82 CHRONIC TOTAL OCCLUSION OF CORONARY ARTERY: Primary | ICD-10-CM

## 2024-09-30 DIAGNOSIS — E78.5 HYPERLIPIDEMIA: ICD-10-CM

## 2024-09-30 DIAGNOSIS — I50.42 CHRONIC COMBINED SYSTOLIC (CONGESTIVE) AND DIASTOLIC (CONGESTIVE) HEART FAILURE: ICD-10-CM

## 2024-09-30 DIAGNOSIS — I25.10 CVD (CARDIOVASCULAR DISEASE): ICD-10-CM

## 2024-09-30 DIAGNOSIS — R94.30 CARDIAC LEFT VENTRICULAR EJECTION FRACTION 30-35 PERCENT: ICD-10-CM

## 2024-09-30 DIAGNOSIS — I25.10 CAD IN NATIVE ARTERY: ICD-10-CM

## 2024-09-30 LAB
ACT BLD: 273 SEC (ref 83–199)
BASE EXCESS BLDMV CALC-SCNC: 0.3 MMOL/L (ref -2–3)
BASE EXCESS BLDV CALC-SCNC: -3.4 MMOL/L (ref -2–3)
BODY TEMPERATURE: 37 DEGREES CELSIUS
BODY TEMPERATURE: 37 DEGREES CELSIUS
HCO3 BLDMV-SCNC: 24.6 MMOL/L (ref 22–26)
HCO3 BLDV-SCNC: 21.4 MMOL/L (ref 22–26)
OXYHGB MFR BLDMV: 70.8 % (ref 45–75)
OXYHGB MFR BLDV: 72.5 % (ref 45–75)
PCO2 BLDMV: 38 MM HG (ref 41–51)
PCO2 BLDV: 37 MM HG (ref 41–51)
PH BLDMV: 7.42 PH (ref 7.33–7.43)
PH BLDV: 7.37 PH (ref 7.33–7.43)
PO2 BLDMV: 40 MM HG (ref 35–45)
PO2 BLDV: 42 MM HG (ref 35–45)
SAO2 % BLDMV: 73 % (ref 45–75)
SAO2 % BLDV: 75 % (ref 45–75)

## 2024-09-30 PROCEDURE — 82810 BLOOD GASES O2 SAT ONLY: CPT | Mod: CCI

## 2024-09-30 PROCEDURE — 99153 MOD SED SAME PHYS/QHP EA: CPT | Performed by: INTERNAL MEDICINE

## 2024-09-30 PROCEDURE — 85347 COAGULATION TIME ACTIVATED: CPT

## 2024-09-30 PROCEDURE — C1769 GUIDE WIRE: HCPCS | Performed by: INTERNAL MEDICINE

## 2024-09-30 PROCEDURE — 2500000004 HC RX 250 GENERAL PHARMACY W/ HCPCS (ALT 636 FOR OP/ED): Performed by: INTERNAL MEDICINE

## 2024-09-30 PROCEDURE — 93456 R HRT CORONARY ARTERY ANGIO: CPT | Performed by: INTERNAL MEDICINE

## 2024-09-30 PROCEDURE — 2720000007 HC OR 272 NO HCPCS: Performed by: INTERNAL MEDICINE

## 2024-09-30 PROCEDURE — 99152 MOD SED SAME PHYS/QHP 5/>YRS: CPT | Performed by: INTERNAL MEDICINE

## 2024-09-30 PROCEDURE — 7100000009 HC PHASE TWO TIME - INITIAL BASE CHARGE: Performed by: INTERNAL MEDICINE

## 2024-09-30 PROCEDURE — 93460 R&L HRT ART/VENTRICLE ANGIO: CPT | Performed by: INTERNAL MEDICINE

## 2024-09-30 PROCEDURE — 7100000010 HC PHASE TWO TIME - EACH INCREMENTAL 1 MINUTE: Performed by: INTERNAL MEDICINE

## 2024-09-30 PROCEDURE — C1887 CATHETER, GUIDING: HCPCS | Performed by: INTERNAL MEDICINE

## 2024-09-30 PROCEDURE — 96373 THER/PROPH/DIAG INJ IA: CPT | Performed by: INTERNAL MEDICINE

## 2024-09-30 PROCEDURE — C1894 INTRO/SHEATH, NON-LASER: HCPCS | Performed by: INTERNAL MEDICINE

## 2024-09-30 PROCEDURE — 2550000001 HC RX 255 CONTRASTS: Performed by: INTERNAL MEDICINE

## 2024-09-30 PROCEDURE — 2500000005 HC RX 250 GENERAL PHARMACY W/O HCPCS: Performed by: INTERNAL MEDICINE

## 2024-09-30 PROCEDURE — 82805 BLOOD GASES W/O2 SATURATION: CPT

## 2024-09-30 RX ORDER — HEPARIN SODIUM 1000 [USP'U]/ML
INJECTION, SOLUTION INTRAVENOUS; SUBCUTANEOUS AS NEEDED
Status: DISCONTINUED | OUTPATIENT
Start: 2024-09-30 | End: 2024-09-30 | Stop reason: HOSPADM

## 2024-09-30 RX ORDER — LIDOCAINE HYDROCHLORIDE 20 MG/ML
INJECTION, SOLUTION INFILTRATION; PERINEURAL AS NEEDED
Status: DISCONTINUED | OUTPATIENT
Start: 2024-09-30 | End: 2024-09-30 | Stop reason: HOSPADM

## 2024-09-30 RX ORDER — NAPROXEN SODIUM 220 MG/1
81 TABLET, FILM COATED ORAL DAILY
Qty: 90 TABLET | Refills: 3 | Status: SHIPPED | OUTPATIENT
Start: 2024-09-30 | End: 2025-09-30

## 2024-09-30 RX ORDER — SODIUM CHLORIDE 9 MG/ML
1000 INJECTION, SOLUTION INTRAVENOUS ONCE AS NEEDED
Status: CANCELLED | OUTPATIENT
Start: 2024-09-30

## 2024-09-30 RX ORDER — SODIUM CHLORIDE 9 MG/ML
125 INJECTION, SOLUTION INTRAVENOUS CONTINUOUS
Status: DISCONTINUED | OUTPATIENT
Start: 2024-09-30 | End: 2024-09-30 | Stop reason: HOSPADM

## 2024-09-30 RX ORDER — FENTANYL CITRATE 50 UG/ML
INJECTION, SOLUTION INTRAMUSCULAR; INTRAVENOUS AS NEEDED
Status: DISCONTINUED | OUTPATIENT
Start: 2024-09-30 | End: 2024-09-30 | Stop reason: HOSPADM

## 2024-09-30 RX ORDER — MIDAZOLAM HYDROCHLORIDE 1 MG/ML
INJECTION, SOLUTION INTRAMUSCULAR; INTRAVENOUS AS NEEDED
Status: DISCONTINUED | OUTPATIENT
Start: 2024-09-30 | End: 2024-09-30 | Stop reason: HOSPADM

## 2024-09-30 ASSESSMENT — PAIN SCALES - GENERAL
PAINLEVEL_OUTOF10: 0 - NO PAIN

## 2024-09-30 ASSESSMENT — PAIN - FUNCTIONAL ASSESSMENT: PAIN_FUNCTIONAL_ASSESSMENT: 0-10

## 2024-09-30 NOTE — DISCHARGE INSTRUCTIONS
If you have any questions, please call the Cath Lab Nurse Practitioner Tammiewanda Sanchez at [phone] and leave a message. She will return your call the same day if calling before 3 PM, M-F. If you call on the weekend you can expect a call back on Monday morning. You may also call the Cath Lab at [phone] M-F, 7-3:30 with any questions. Weekends and after hours please call your cardiologist office number to reach a physician on call. The heart group office number is [phone]           CARDIAC CATHETERIZATION DISCHARGE INSTRUCTIONS     FOR SUDDEN AND SEVERE CHEST PAIN, SHORTNESS OF BREATH, EXCESSIVE BLEEDING, SIGNS OF STROKE, OR CHANGES IN MENTAL STATUS YOU SHOULD CALL 911 IMMEDIATELY.     If your provider has prescribed aspirin and/or clopidogrel (Plavix), or prasugrel (Effient), or ticagrelor (Brilinta), DO NOT STOP THESE MEDICATIONS for any reason without talking to your cardiologist first. If any of these were prescribed, you must take them every day without missing a single dose. If you are getting low on these medications, contact your provider immediately for a refill.     FOR NEXT 24 HOURS  - Upon discharge, you should return home and rest for the remainder of the day and evening. You do not have to stay on bed rest but should not be very active.  It is recommended a responsible adult be with you for the first 24 hours after the procedure.    - No driving for 24 hours after procedure. Please arrange for someone to drive you home from the hospital today.     - Do not drive, operate machinery, or use power tools for 24 hours after your procedure.     - Do not make any legal decisions for 24 hours after your procedure.     - Do not drink alcoholic beverages for 24 hours after your procedure.    WOUND CARE   *FOR FEMORAL (LEG) ACCESS*  ·      Avoid heavy lifting (over 10 pounds) for 7 days, squatting or excessive bending for 2 days, and strenuous exercise for 7 days.  ·      No submerged bathing, swimming, or  hot tubs for the next 7 days, or until fully healed.  ·      Avoid sexual activity for 3-4 days until any groin discomfort has ceased.     *FOR RADIAL (WRIST) ACCESS*  ·      No lifting more than 5 pounds or excessive use of the wrist for 24 hours - for example, treat your wrist as if it is sprained.  ·      Do not engage in vigorous activities (tennis, golf, bowling, weights) for at least 48 hours after the procedure.  ·      Do not submerge the wrist for 7 days after the procedure.  ·      You should expect mild tingling in your hand and tenderness at the puncture site for up to 3 days.    - The transparent dressing should be removed from the site 24 hours after the procedure.  Wash the site gently with soap and water. Rinse well and pat dry. Keep the area clean and dry. You may apply a Band-Aid to the site. Avoid lotions, ointments, or powders until fully healed.     - You may shower the day after your procedure.      - It is normal to notice a small bruise around the puncture site and/or a small grape sized or smaller lump. Any large bruising or large lump warrants a call to the office.     - If bleeding should occur, lay down and apply pressure to the affected area for 10 minutes.  If the bleeding stops notify your physician.  If there is a large amount of bleeding or spurting of blood CALL 911 immediately.  DO NOT drive yourself to the hospital.    - You may experience some tenderness, bruising or minimal inflammation.  If you have any concerns, you may contact the Cath Lab or if any of these symptoms become excessive, contact your cardiologist or go to the emergency room.     OTHER INSTRUCTIONS  - You may take acetaminophen (Tylenol) as directed for discomfort.  If pain is not relieved with acetaminophen (Tylenol), contact your doctor.    - If you notice or experience any of the following, you should notify your doctor or seek medical attention  Chest pain or discomfort  Change in mental status or weakness in  extremities.  Dizziness, light headedness, or feeling faint.  Change in the site where the procedure was performed, such as bleeding or an increased area of bruising or swelling.  Tingling, numbness, pain, or coolness in the leg/arm beyond the site where the procedure was performed.  Signs of infection (i.e. shaking chills, temperature > 100 degrees Fahrenheit, warmth, redness) in the leg/arm area where the procedure was performed.  Changes in urination   Bloody or black stools  Vomiting blood  Severe nose bleeds  Any excessive bleeding    - If you DO NOT have an appointment with your cardiologist within 2-4 weeks following your procedure, please contact their office.      Important ways to reduce your risk of coronary artery disease by healthy diet, maintaining a healthy weight, exercising regularly and stop using tobacco products.     Mediterranean Diet    About this topic  This is a heart healthy diet. It is based on widely used foods and cooking styles from many countries around the [place]. The main pattern for the diet is more plant foods and monounsaturated fats, or good fats, like olive oil. Protein in this diet comes from seafood, legumes, nuts, seeds, and poultry and eggs in lowered amounts. You will also eat more whole grains, vegetables, and fruits and moderate amounts of alcohol are also included. This diet has less red meats, dairy products, and processed foods.    What will the results be?  Your diet will have less saturated fat, cholesterol, calories, sodium, and added sugars. Your diet will be higher in fiber. This will help to keep your blood sugar steady. This diet lowers the chance of heart disease and other health problems.  What lifestyle changes are needed?  If you do not often eat this way, you will need to change your eating habits. Be sure to get regular exercise. It is believed to help the health benefits of this diet.  What changes to diet are needed?  You may need to limit the  "amount of red meat and processed foods in your diet. Ask your dietitian for help planning meals that are right for you.  What foods are good to eat?  Plenty of fish and other seafood  Fresh, frozen, or canned fruits and vegetables  Nuts and nut butters and dried beans, lentils, or peas  Foods high in fiber like whole grains and whole grain products  Olive oil (good fat), peanut or canola oil, margarine, or spreads that list vegetable oil as the first ingredient and do not contain trans fat or partially hydrogenated oil  Small amounts of poultry and eggs  What foods should be limited or avoided?  Red meats  Sweets, desserts, and processed foods  Butter, oils, and fats that contain trans fats or are hydrogenated or partially hydrogenated  Gravies and sauces  What problems could happen?  Your weight may rise because your diet will be higher in fat from olive oil and nuts.  You may have lower iron levels. Be sure to eat foods rich in iron. Also, eat foods rich in vitamin C. This will help your body take in iron.  You may have lower calcium levels because you are eating less dairy products. Ask your doctor if you need to take a calcium supplement.  Wine is often thought of as part of a Mediterranean diet. It is not needed and you may choose not to include it. Avoid wine if you are prone to alcohol abuse or are pregnant. Also, avoid it if you are at risk for breast cancer, have liver problems, or have other illnesses that make it important for you to not have alcohol.  When do I need to call the doctor?  If you have any concerns about your diet     Health risks of obesity    The Basics  Written by the doctors and editors at [facility]  What does it mean to have obesity? -- Doctors use a calculation called \"body mass index,\" or \"BMI,\" to decide whether a person is underweight, at a healthy weight, overweight, or has obesity.  Your BMI will tell you which category you are in based on your weight and height (figure 1):  ?If " "your BMI is between 25 and 29.9, you are overweight.  ?If your BMI is 30 or greater, you have obesity.  Obesity is a problem, because it increases the risks of many different health problems. It can also make it hard for you to move, breathe, and do other things that people who are at a healthy weight can do easily.  What are the health risks of obesity? -- Having obesity increases a person's risk of developing many health problems. Here are just a few examples:  ?Diabetes  ?High blood pressure  ?High cholesterol  ?Heart disease (including heart attacks)  ?Stroke  ?Sleep apnea (a disorder in which you stop breathing for short periods while asleep)  ?Asthma  ?Cancer  Does having obesity shorten a person's life? -- Yes. Studies show that people with obesity die younger than people who are a healthy weight. They also show that the risk of death goes up the heavier a person is. The degree of increased risk depends on how long the person has had obesity, and on what other medical problems they have.  People with \"central obesity\" might also be at risk of dying younger. Central obesity means carrying extra weight in the belly area, even if the BMI is normal.  Should I see an expert? -- Yes. If you are overweight or have obesity, you can talk to your doctor or nurse. They might have suggestions for healthy ways to lose weight. It can also help to work with a dietitian (food and nutrition expert). A dietitian can help you choose healthy foods and plan meals.  Are there medical treatments that can help me lose weight? -- Yes. There are medicines and surgery to help with weight loss. But those treatments are only for people who have not been able to lose weight through lifestyle changes such as diet and exercise. Also, weight loss treatments do not take the place of diet and exercise. People who have those treatments must also change how they eat and how active they are.  What can I do to prevent the problems caused by " obesity? -- The best thing that you can do is lose weight. But even if weight loss is not possible, you can improve your health and lower your risk if you:  ?Become more active - Many types of physical activity can help, including walking. You can start with a few minutes a day and add more as you get stronger and build up your endurance. Anything that gets your body moving is good for you.  ?Improve your diet - It is healthy to have regular meal times, eat smaller portions, and not skip meals. Limit sweets, and avoid processed foods. Try to eat more vegetables and fruits instead.  ?Quit smoking (if you smoke) - Some people start eating more after they stop smoking, so try to make healthy food choices. Even if it increases your appetite, quitting smoking is still one of the best things that you can do to improve your health.  ?Limit alcohol - For females, drink no more than 1 drink a day. For males, drink no more than 2 drinks a day.  What causes obesity? -- The thing that increases a person's risk the most is having an unhealthy lifestyle. Most people develop obesity because they eat too much, eat unhealthy foods, and move too little. That's especially true of people who watch too much TV. But there are also other things that seem to increase the risk of obesity that many people do not know about. Here are some things that might affect a person's chance of developing obesity:  ?Mother's habits during and after pregnancy - People who eat a lot of calories, have diabetes, or smoke during pregnancy have a higher chance of having babies who have obesity as adults. Also, babies who drink formula might be more likely than  babies to develop obesity later in life.  ?Habits and weight gain during childhood - Children or teens who are overweight or have obesity are more likely to become have obesity as an adult.  ?Sleeping too little - People who do not get enough sleep are more likely to develop obesity than  "people who sleep enough.  ?Taking certain medicines - Long-term use of certain medicines, such as some medicines to treat depression, can cause weight gain. If you are concerned that 1 of your medicines might be making you gain weight, talk to your doctor or nurse. They might be able to switch you to a different medicine instead.  ?Certain hormonal conditions - Some hormonal problems can increase the risk of developing obesity. For example, a condition called \"polycystic ovary syndrome\" can cause weight gain, along with other symptoms like irregular periods.  What if I want to get pregnant? -- If you are overweight or have obesity, it might be harder to get pregnant. For males, obesity can also cause sex problems, like having trouble getting or keeping an erection. This is more likely if you also have high blood pressure or diabetes.  What if my child has obesity? -- In children, obesity has many of the same risks as it does in adults. For example, it can increase the risk of diabetes, high blood pressure, asthma, and sleep apnea. It can also cause added problems related to childhood. For example, obesity can make children grow faster than normal and cause girls to go through puberty earlier than usual.  All topics are updated as new evidence becomes available and our peer review process is complete.  This topic retrieved from [employer] on: Jan 11, 2024.  Topic 59328 Version 17.0  Release: 31.6.4 - C32.10  © 2024 Up[employer], [employer]. and/or its affiliates. All rights reserved.  figure 1: Your body mass index (BMI)        If you use tobacco products please review   Quitting smoking    The Basics  Written by the doctors and editors at [employer]  What are the benefits of quitting smoking? -- Quitting smoking can dramatically improve your health and help you live longer. It lowers your risk of heart disease, lung disease, kidney failure, cancer, infection, stomach problems, and diabetes.  Quitting smoking can also lower your " "chances of getting osteoporosis, a condition that makes your bones weak.  Quitting is not easy for most people, and it might take several tries to completely quit. But help and support are available. Quitting smoking will improve your health no matter how old you are, even if you have smoked for a long time.  What should I do if I want to quit smoking? -- It's a good idea to start by talking with your doctor or nurse. It is possible to quit on your own, without help. But getting help greatly increases your chances of quitting successfully.  When you are ready to quit, you will make a plan to:  ?Set a quit date.  ?Tell your family and friends that you plan to quit.  ?Plan ahead for the challenges you will face, such as cigarette cravings.  ?Remove cigarettes from your home, car, and work.  How can my doctor or nurse help? -- Your doctor or nurse can give you advice on the best way to quit. They can also give you medicines to:  ?Reduce your craving for cigarettes  ?Reduce your \"withdrawal\" symptoms (symptoms that happen when you stop smoking)  Your doctor or nurse can also help you find a counselor to talk to. For most people who are trying to quit smoking, it works best to use both medicines and counseling.  You can also get help from a free phone line (4-831-QUITNOW, or 1-811.376.3754) or go online to www.smokefree.gov.  What are the symptoms of withdrawal? -- When you stop smoking, you might have symptoms such as:  ?Trouble sleeping  ?Feeling irritable, anxious, or restless  ?Getting frustrated or angry  ?Having trouble thinking clearly  These symptoms can be hard to deal with, which is why it can be so hard to quit. But medicines can help.  Some people who stop smoking become temporarily depressed. Some people need treatment for depression, such as counseling or medicines or both. People with depression might:  ?No longer enjoy or care about doing the things they used to like to do  ?Feel sad, down, hopeless, " nervous, or cranky most of the day, almost every day  ?Lose or gain weight  ?Sleep too much or too little  ?Feel tired or like they have no energy  ?Feel guilty or like they are worth nothing  ?Forget things or feel confused  ?Move and speak more slowly than usual  ?Act restless or have trouble staying still  ?Think about death or suicide  If you think you might be depressed, tell your doctor or nurse right away. They can talk to you about your symptoms and recommend treatment if needed.  Get help right away if you are thinking of hurting or killing yourself! -- Sometimes, people with depression think of hurting or killing themselves. If you ever feel like you might hurt yourself, help is available:  ?In the , contact the [employer] Suicide [facility] Crisis Lifeline:  To speak to someone, call or text [employer].  To talk to someone online, go to www.[employer]/chat.  ?Call your doctor or nurse and tell them that it is an emergency.  ?Call for an ambulance (in the [place] and [place], call 9-1-1).  ?Go to the emergency department at your local hospital.  If you think your partner might have depression, or if you are worried that they might hurt themselves, get them help right away.  How does counseling work? -- A counselor can help you figure out:  ?What triggers you to want to smoke, and how to handle these situations  ?How to resist cravings  ?What you can do differently if you have tried to quit before  You can meet with a counselor in 1-on-1 sessions or as part of a group. There are other ways to get counseling, too, such as over the phone, through text messaging, or online.  How do medicines help you stop smoking? -- Different medicines work in different ways:  ?Nicotine replacement therapy - Nicotine is the main drug in cigarettes, and the reason they are addictive. These medicines reduce your body's craving for nicotine. They also help with withdrawal symptoms.  There are different forms of nicotine replacement, including skin  "patches, lozenges, gum, nasal sprays, and inhalers. Most can be bought without a prescription. Also, health insurance might cover some or all of the cost.  It often helps to use 2 forms of nicotine replacement. For example, you might wear a patch all the time, plus use gum or lozenges when you get a craving to smoke.  ?Varenicline - Varenicline (brand names: Chantix, Champix) is a prescription medicine that reduces withdrawal symptoms and cigarette cravings. Varenicline can increase the effects of alcohol in some people. It's a good idea to limit drinking while you're taking it, at least until you know how it affects you.  Even if you are not yet ready to commit to a quit date, varenicline can help reduce cravings. This can make it easier to quit when you are ready.  ?Bupropion - Bupropion (sample brand names: Wellbutrin, Zyban) is a prescription medicine that reduces your desire to smoke. It is also available in a generic version, which is cheaper than the brand name medicines. Doctors do not usually prescribe bupropion for people with seizures or who have had seizures in the past.  It might also be helpful to combine nicotine replacement with bupropion or varenicline. In some cases, a person might even take both bupropion and varenicline. Your doctor or nurse can help you figure out the best combination for you.  What about e-cigarettes? -- Sometimes people wonder if using electronic cigarettes, or \"e-cigarettes,\" can help them quit smoking. Using e-cigarettes is also called \"vaping.\" Doctors do not recommend e-cigarettes in place of using of medicines and counseling. That's because e-cigarettes still contain nicotine as well as other substances that might be harmful. It's also not clear how they can affect a person's health in the long term.  What if I am pregnant and I smoke? -- If you are pregnant, it's really important for the health of your baby that you quit. Ask your doctor what options you have, and what " is safest for your baby.  What if I have already smoked for a long time? -- The longer you have smoked, the higher your chances are of having health problems. But it is never too late to quit smoking. It helps your health even if you are older or have smoked for many years. The best thing you can do to lower your chance of having a health problem caused by smoking is to quit.  Will I gain weight if I quit? -- You might gain a few pounds. This can be frustrating for some people. But it's important to remember that you are improving your health by quitting smoking. You can help prevent gaining a lot of weight by staying active and eating a healthy diet.  What if I am not able to quit? -- If you don't quit on your first try, or if you quit but then start smoking again, don't give up hope. Lots of people have to try more than once before they are able to completely quit.  It might help to try to understand why quitting did not work. There might be something you can do differently when you try again. It can help to figure out which situations make you want to smoke, so you can avoid them.  What else can I do to improve my chances of quitting? -- You can:  ?Get regular exercise. Any type of physical activity, even gentle forms of movement, is good for your health. Physical activity can also help reduce stress.  ?Stay away from people who smoke and places that make you want to smoke. If people close to you smoke, ask them to quit with you or avoid smoking around you.  ?Carry gum, hard candy, or something to put in your mouth. If you get a craving for a cigarette, try 1 of these instead.  ?Don't give up, even if you start smoking again. It takes most people a few tries before they succeed.  All topics are updated as new evidence becomes available and our peer review process is complete.

## 2024-09-30 NOTE — PRE-SEDATION DOCUMENTATION
Interventional Cardiology Preprocedure Note    Kerry Spence   Indication for procedure: Diagnoses of CAD in native artery, CVD (cardiovascular disease), and Cardiac left ventricular ejection fraction 30-35 percent were pertinent to this visit. Patient presents today for a left and right heart cath.      Relevant review of systems: NA      /62   Pulse 65   Temp 36.4 °C (97.6 °F) (Temporal)   Resp 18   SpO2 97%    Relevant Labs:   Lab Results   Component Value Date    CREATININE 0.92 09/27/2024    EGFR 65 09/27/2024    INR 1.7 (H) 01/25/2023    PROTIME 19.7 (H) 01/25/2023       Planned Sedation/Anesthesia: Moderate    Airway assessment: normal    Physical Exam  Constitutional:       General: She is not in acute distress.     Appearance: Normal appearance. She is not ill-appearing or toxic-appearing.   HENT:      Head: Normocephalic.      Mouth/Throat:      Mouth: Mucous membranes are moist.      Pharynx: Oropharynx is clear.   Eyes:      General: No scleral icterus.  Cardiovascular:      Rate and Rhythm: Normal rate. Rhythm irregular.      Heart sounds: Normal heart sounds, S1 normal and S2 normal. No murmur heard.     No gallop.      Comments: SR w/ Mild ectopy  Barbeau Right Radial - Type B  Barbeau Left Radial - Type C  Pulmonary:      Effort: Pulmonary effort is normal. No respiratory distress.      Breath sounds: Normal breath sounds. No wheezing, rhonchi or rales.   Musculoskeletal:      Right lower leg: No edema.      Left lower leg: No edema.   Skin:     General: Skin is warm and dry.   Neurological:      General: No focal deficit present.      Mental Status: She is alert and oriented to person, place, and time.   Psychiatric:         Mood and Affect: Mood normal.         Behavior: Behavior normal.         Thought Content: Thought content normal.         Judgment: Judgment normal.         Mallampati: III (soft and hard palate and base of uvula visible)    ASA Score: ASA 3 - Patient with moderate  systemic disease with functional limitations    Benefits, risks and alternatives of procedure and planned sedation have been discussed with the patient and/or their representative. All questions answered and they agree to proceed.     ESTEVAN KOHLI

## 2024-09-30 NOTE — POST-PROCEDURE NOTE
Physician Transition of Care Summary  Invasive Cardiovascular Lab    Procedure Date: 9/30/2024  Attending:    * Ramez Maya - Primary  Resident/Fellow/Other Assistant: Surgeons and Role:  * No surgeons found with a matching role *    Indications:   Pre-op Diagnosis      * CAD in native artery [I25.10]     * CVD (cardiovascular disease) [I25.10]     * Cardiac left ventricular ejection fraction 30-35 percent [R94.30]    Post-procedure diagnosis:   Post-op Diagnosis     * CAD in native artery [I25.10]     * CVD (cardiovascular disease) [I25.10]     * Cardiac left ventricular ejection fraction 30-35 percent [R94.30]    Procedure(s):   Left & Right Heart Cath w Angiography & LV  06947 - MD R & L HRT CATH WINJX HRT ART& L VENTR IMG        Procedure Findings:   Cx 100%, LAD 50%, - RCA ok. - She may need sedation for MRI   RHC: PCWP 8, preserved Olya CO/CI     Description of the Procedure:   LHC  Left brachial>manual   Left radial artery>compression band     Complications:   None     Stents/Implants:   Implants       No implant documentation for this case.            Anticoagulation/Antiplatelet Plan:   Restart eliquis tonight if no issues with access site    Estimated Blood Loss:   5 mL    Anesthesia: Moderate Sedation Anesthesia Staff: No anesthesia staff entered.    Any Specimen(s) Removed:   No specimens collected during this procedure.    Disposition:   Recovery and home     Recs:   Get a MRI as outpatient for viability, pt requesting general anesthesia       Electronically signed by: STEFANI Siegel, 9/30/2024 10:32 AM

## 2024-09-30 NOTE — NURSING NOTE
Pt w/o s/s of bleedinh/hematoma from cath sites. Discharge info reviewed with pt and spouse, questions and concerns answered and addressed. TR band removed successfully and site dressed with gauze dressing. Vitals wnl. Medication administration reviewed. Pt verbalized understanding. IV removed. Pt wheeled out to car in stable condition.

## 2024-09-30 NOTE — Clinical Note
Sheath was removed in the left radial artery. Site closed by radial band. 14 ml air in the Vascband at 1008

## 2024-09-30 NOTE — Clinical Note
Patient ID band present and verified. Patient contact is in waiting room. Contact(s) present: daughter. Contact name: Rafi - call at home.

## 2024-10-04 DIAGNOSIS — I42.9 CARDIOMYOPATHY, UNSPECIFIED TYPE (MULTI): ICD-10-CM

## 2024-10-09 RX ORDER — SACUBITRIL AND VALSARTAN 24; 26 MG/1; MG/1
1 TABLET, FILM COATED ORAL 2 TIMES DAILY
Qty: 180 TABLET | Refills: 2 | Status: SHIPPED | OUTPATIENT
Start: 2024-10-09

## 2024-10-10 ENCOUNTER — APPOINTMENT (OUTPATIENT)
Dept: PRIMARY CARE | Facility: CLINIC | Age: 74
End: 2024-10-10
Payer: MEDICARE

## 2024-10-16 ENCOUNTER — OFFICE VISIT (OUTPATIENT)
Dept: WOUND CARE | Facility: CLINIC | Age: 74
End: 2024-10-16
Payer: MEDICARE

## 2024-10-16 ENCOUNTER — APPOINTMENT (OUTPATIENT)
Dept: PRIMARY CARE | Facility: CLINIC | Age: 74
End: 2024-10-16
Payer: MEDICARE

## 2024-10-16 VITALS
BODY MASS INDEX: 27.43 KG/M2 | HEIGHT: 68 IN | HEART RATE: 86 BPM | SYSTOLIC BLOOD PRESSURE: 124 MMHG | RESPIRATION RATE: 16 BRPM | WEIGHT: 181 LBS | OXYGEN SATURATION: 98 % | DIASTOLIC BLOOD PRESSURE: 78 MMHG

## 2024-10-16 DIAGNOSIS — G62.9 PERIPHERAL POLYNEUROPATHY: ICD-10-CM

## 2024-10-16 DIAGNOSIS — J30.9 ALLERGIC RHINITIS, UNSPECIFIED SEASONALITY, UNSPECIFIED TRIGGER: ICD-10-CM

## 2024-10-16 DIAGNOSIS — J01.81 OTHER ACUTE RECURRENT SINUSITIS: ICD-10-CM

## 2024-10-16 DIAGNOSIS — Z23 NEED FOR PNEUMOCOCCAL 20-VALENT CONJUGATE VACCINATION: ICD-10-CM

## 2024-10-16 DIAGNOSIS — E11.9 TYPE 2 DIABETES MELLITUS WITHOUT COMPLICATION, WITHOUT LONG-TERM CURRENT USE OF INSULIN (MULTI): ICD-10-CM

## 2024-10-16 PROBLEM — I50.812 CHRONIC RIGHT HEART FAILURE: Status: ACTIVE | Noted: 2024-10-16

## 2024-10-16 PROBLEM — D68.69 OTHER THROMBOPHILIA: Status: ACTIVE | Noted: 2024-10-16

## 2024-10-16 PROBLEM — Z89.411 ACQUIRED ABSENCE OF RIGHT GREAT TOE (MULTI): Status: ACTIVE | Noted: 2024-10-16

## 2024-10-16 PROBLEM — M86.171 OTHER ACUTE OSTEOMYELITIS, RIGHT ANKLE AND FOOT (MULTI): Status: ACTIVE | Noted: 2024-10-16

## 2024-10-16 PROBLEM — I42.9 CARDIOMYOPATHY, UNSPECIFIED: Status: ACTIVE | Noted: 2024-10-16

## 2024-10-16 PROBLEM — L97.413: Status: ACTIVE | Noted: 2024-10-16

## 2024-10-16 PROBLEM — E11.29 TYPE 2 DIABETES MELLITUS WITH OTHER DIABETIC KIDNEY COMPLICATION: Status: ACTIVE | Noted: 2024-10-16

## 2024-10-16 LAB — POC HEMOGLOBIN A1C: 5.4 % (ref 4.2–6.5)

## 2024-10-16 PROCEDURE — 1159F MED LIST DOCD IN RCRD: CPT | Performed by: FAMILY MEDICINE

## 2024-10-16 PROCEDURE — 3078F DIAST BP <80 MM HG: CPT | Performed by: FAMILY MEDICINE

## 2024-10-16 PROCEDURE — 3044F HG A1C LEVEL LT 7.0%: CPT | Performed by: FAMILY MEDICINE

## 2024-10-16 PROCEDURE — 90677 PCV20 VACCINE IM: CPT | Performed by: FAMILY MEDICINE

## 2024-10-16 PROCEDURE — 1123F ACP DISCUSS/DSCN MKR DOCD: CPT | Performed by: FAMILY MEDICINE

## 2024-10-16 PROCEDURE — 3050F LDL-C >= 130 MG/DL: CPT | Performed by: FAMILY MEDICINE

## 2024-10-16 PROCEDURE — G0009 ADMIN PNEUMOCOCCAL VACCINE: HCPCS | Performed by: FAMILY MEDICINE

## 2024-10-16 PROCEDURE — 3008F BODY MASS INDEX DOCD: CPT | Performed by: FAMILY MEDICINE

## 2024-10-16 PROCEDURE — 83036 HEMOGLOBIN GLYCOSYLATED A1C: CPT | Performed by: FAMILY MEDICINE

## 2024-10-16 PROCEDURE — 1036F TOBACCO NON-USER: CPT | Performed by: FAMILY MEDICINE

## 2024-10-16 PROCEDURE — 3074F SYST BP LT 130 MM HG: CPT | Performed by: FAMILY MEDICINE

## 2024-10-16 PROCEDURE — 99214 OFFICE O/P EST MOD 30 MIN: CPT | Performed by: FAMILY MEDICINE

## 2024-10-16 PROCEDURE — 3061F NEG MICROALBUMINURIA REV: CPT | Performed by: FAMILY MEDICINE

## 2024-10-16 PROCEDURE — 11042 DBRDMT SUBQ TIS 1ST 20SQCM/<: CPT

## 2024-10-16 PROCEDURE — 1160F RVW MEDS BY RX/DR IN RCRD: CPT | Performed by: FAMILY MEDICINE

## 2024-10-16 RX ORDER — HYDROCODONE BITARTRATE AND ACETAMINOPHEN 5; 325 MG/1; MG/1
1 TABLET ORAL EVERY 8 HOURS PRN
Qty: 90 TABLET | Refills: 0 | Status: SHIPPED | OUTPATIENT
Start: 2024-10-16 | End: 2024-11-15

## 2024-10-16 RX ORDER — PSEUDOEPHEDRINE HCL 120 MG/1
120 TABLET, FILM COATED, EXTENDED RELEASE ORAL EVERY 12 HOURS PRN
Qty: 60 TABLET | Refills: 11 | Status: SHIPPED | OUTPATIENT
Start: 2024-10-16 | End: 2025-10-16

## 2024-10-16 RX ORDER — TIRZEPATIDE 12.5 MG/.5ML
12.5 INJECTION, SOLUTION SUBCUTANEOUS WEEKLY
Qty: 6 ML | Refills: 3 | Status: SHIPPED | OUTPATIENT
Start: 2024-10-16 | End: 2025-10-16

## 2024-10-16 ASSESSMENT — ANXIETY QUESTIONNAIRES
5. BEING SO RESTLESS THAT IT IS HARD TO SIT STILL: NOT AT ALL
4. TROUBLE RELAXING: NOT AT ALL
1. FEELING NERVOUS, ANXIOUS, OR ON EDGE: NOT AT ALL
IF YOU CHECKED OFF ANY PROBLEMS ON THIS QUESTIONNAIRE, HOW DIFFICULT HAVE THESE PROBLEMS MADE IT FOR YOU TO DO YOUR WORK, TAKE CARE OF THINGS AT HOME, OR GET ALONG WITH OTHER PEOPLE: NOT DIFFICULT AT ALL
3. WORRYING TOO MUCH ABOUT DIFFERENT THINGS: NOT AT ALL
GAD7 TOTAL SCORE: 0
6. BECOMING EASILY ANNOYED OR IRRITABLE: NOT AT ALL
7. FEELING AFRAID AS IF SOMETHING AWFUL MIGHT HAPPEN: NOT AT ALL
2. NOT BEING ABLE TO STOP OR CONTROL WORRYING: NOT AT ALL

## 2024-10-16 ASSESSMENT — COLUMBIA-SUICIDE SEVERITY RATING SCALE - C-SSRS
6. HAVE YOU EVER DONE ANYTHING, STARTED TO DO ANYTHING, OR PREPARED TO DO ANYTHING TO END YOUR LIFE?: NO
1. IN THE PAST MONTH, HAVE YOU WISHED YOU WERE DEAD OR WISHED YOU COULD GO TO SLEEP AND NOT WAKE UP?: NO
2. HAVE YOU ACTUALLY HAD ANY THOUGHTS OF KILLING YOURSELF?: NO

## 2024-10-16 ASSESSMENT — ENCOUNTER SYMPTOMS
DEPRESSION: 0
OCCASIONAL FEELINGS OF UNSTEADINESS: 0
LOSS OF SENSATION IN FEET: 0

## 2024-10-16 ASSESSMENT — PATIENT HEALTH QUESTIONNAIRE - PHQ9
SUM OF ALL RESPONSES TO PHQ9 QUESTIONS 1 AND 2: 0
2. FEELING DOWN, DEPRESSED OR HOPELESS: NOT AT ALL
1. LITTLE INTEREST OR PLEASURE IN DOING THINGS: NOT AT ALL

## 2024-10-16 NOTE — PROGRESS NOTES
Subjective   Patient ID: Charlene Spence is a 73 y.o. female.    GILES Ray is here for follow up. She is currently seeing new and is having worsening EF and is now having cardiac MRI. Has had Echo and Cardiac Cath,   OARRS:  Anca Jerry MD on 10/16/2024  5:02 PM  I have personally reviewed the OARRS report for Kerry Spence. I have considered the risks of abuse, dependence, addiction and diversion and I believe that it is clinically appropriate for Kerry Spence to be prescribed this medication    Is the patient prescribed a combination of a benzodiazepine and opioid?  No    Last Urine Drug Screen / ordered today: Yes  Recent Results (from the past 8760 hours)   Confirmation Opiate/Opioid/Benzo Prescription Compliance    Collection Time: 04/10/24  5:06 PM   Result Value Ref Range    Clonazepam <25 <25 ng/mL    7-Aminoclonazepam <25 <25 ng/mL    Alprazolam <25 <25 ng/mL    Alpha-Hydroxyalprazolam <25 <25 ng/mL    Midazolam <25 <25 ng/mL    Alpha-Hydroxymidazolam <25 <25 ng/mL    Chlordiazepoxide <25 <25 ng/mL    Diazepam <25 <25 ng/mL    Nordiazepam <25 <25 ng/mL    Temazepam <25 <25 ng/mL    Oxazepam <25 <25 ng/mL    Lorazepam <25 <25 ng/mL    Methadone <25 <25 ng/mL    EDDP <25 <25 ng/mL    6-Acetylmorphine <25 <25 ng/mL    Codeine <50 <50 ng/mL    Hydrocodone <25 <25 ng/mL    Hydromorphone <25 <25 ng/mL    Morphine  <50 <50 ng/mL    Norhydrocodone <25 <25 ng/mL    Noroxycodone <25 <25 ng/mL    Oxycodone <25 <25 ng/mL    Oxymorphone <25 <25 ng/mL    Fentanyl <2.5 <2.5 ng/mL    Norfentanyl <2.5 <2.5 ng/mL    Tramadol <50 <50 ng/mL    O-Desmethyltramadol <50 <50 ng/mL    Zolpidem <25 <25 ng/mL    Zolpidem Metabolite (ZCA) <25 <25 ng/mL   Screen Opiate/Opioid/Benzo Prescription Compliance    Collection Time: 04/10/24  5:06 PM   Result Value Ref Range    Creatinine, Urine Random 60.7 20.0 - 320.0 mg/dL    Amphetamine Screen, Urine Presumptive Negative Presumptive Negative    Barbiturate Screen, Urine  Presumptive Negative Presumptive Negative    Cannabinoid Screen, Urine Presumptive Negative Presumptive Negative    Cocaine Metabolite Screen, Urine Presumptive Negative Presumptive Negative    PCP Screen, Urine Presumptive Negative Presumptive Negative     Results are as expected.         Controlled Substance Agreement:  Date of the Last Agreement: 4/2024  Reviewed Controlled Substance Agreement including but not limited to the benefits, risks, and alternatives to treatment with a Controlled Substance medication(s).    Opioids:  What is the patient's goal of therapy? Improved pain  Is this being achieved with current treatment? yes    I have calculated the patient's Morphine Dose Equivalent (MED):   I have considered referral to Pain Management and/or a specialist, and do not feel it is necessary at this time.    I feel that it is clinically indicated to continue this current medication regimen after consideration of alternative therapies, and other non-opioid treatment.    Opioid Risk Screening:  No data recorded    Pain Assessment:  3/2024  Review of Systems   All other systems reviewed and are negative.      Objective   Physical Exam  Vitals reviewed.   Constitutional:       Appearance: Normal appearance.   HENT:      Head: Normocephalic and atraumatic.      Right Ear: Tympanic membrane normal.      Left Ear: Tympanic membrane normal.      Nose: Nose normal.      Mouth/Throat:      Mouth: Mucous membranes are moist.      Pharynx: Oropharynx is clear.   Eyes:      Extraocular Movements: Extraocular movements intact.      Conjunctiva/sclera: Conjunctivae normal.      Pupils: Pupils are equal, round, and reactive to light.   Cardiovascular:      Rate and Rhythm: Normal rate and regular rhythm.      Pulses: Normal pulses.      Heart sounds: Normal heart sounds.   Pulmonary:      Effort: Pulmonary effort is normal.      Breath sounds: Normal breath sounds.   Abdominal:      General: Abdomen is flat. Bowel sounds are  normal.      Palpations: Abdomen is soft.   Musculoskeletal:         General: Normal range of motion.      Cervical back: Normal range of motion and neck supple.   Skin:     General: Skin is warm and dry.      Capillary Refill: Capillary refill takes less than 2 seconds.      Findings: Lesion present.   Neurological:      General: No focal deficit present.      Mental Status: She is alert and oriented to person, place, and time.   Psychiatric:         Mood and Affect: Mood normal.         Behavior: Behavior normal.         Assessment/Plan   Diagnoses and all orders for this visit:  Type 2 diabetes mellitus without complication, without long-term current use of insulin (Multi)  -     POCT glycosylated hemoglobin (Hb A1C) manually resulted  -     tirzepatide (Mounjaro) 12.5 mg/0.5 mL pen injector; Inject 12.5 mg under the skin 1 (one) time per week. Decreased dose due to decreased appetite and low A1C   -     Follow Up In [facility] - PCP - Established; Future  Peripheral polyneuropathy  -     Follow Up In [facility] - PCP - Established  -     HYDROcodone-acetaminophen (Norco) 5-325 mg tablet; Take 1 tablet by mouth every 8 hours if needed for severe pain (7 - 10).  Other acute recurrent sinusitis  -     pseudoephedrine ER (Sudafed 12 Hour) 120 mg 12 hr tablet; Take 1 tablet (120 mg) by mouth every 12 hours if needed for congestion.  Allergic rhinitis, unspecified seasonality, unspecified trigger  -     pseudoephedrine ER (Sudafed 12 Hour) 120 mg 12 hr tablet; Take 1 tablet (120 mg) by mouth every 12 hours if needed for congestion.  Decreased ejection fraction, cardiomyopathy. Await cardiac MRI, patient to have second opinion, may need at least 2 vessel bypass or stent placement

## 2024-10-18 ENCOUNTER — APPOINTMENT (OUTPATIENT)
Dept: RADIOLOGY | Facility: HOSPITAL | Age: 74
End: 2024-10-18
Payer: MEDICARE

## 2024-10-21 DIAGNOSIS — R80.9 DIABETES MELLITUS WITH PROTEINURIA (MULTI): ICD-10-CM

## 2024-10-21 DIAGNOSIS — E11.29 DIABETES MELLITUS WITH PROTEINURIA (MULTI): ICD-10-CM

## 2024-10-21 RX ORDER — FLASH GLUCOSE SENSOR
1 KIT MISCELLANEOUS
Qty: 6 EACH | Refills: 3 | Status: SHIPPED | OUTPATIENT
Start: 2024-10-21 | End: 2025-10-21

## 2024-10-30 ENCOUNTER — OFFICE VISIT (OUTPATIENT)
Dept: WOUND CARE | Facility: CLINIC | Age: 74
End: 2024-10-30
Payer: MEDICARE

## 2024-10-30 PROCEDURE — 11042 DBRDMT SUBQ TIS 1ST 20SQCM/<: CPT

## 2024-11-08 ENCOUNTER — TELEPHONE (OUTPATIENT)
Dept: CARDIOLOGY | Facility: HOSPITAL | Age: 74
End: 2024-11-08
Payer: MEDICARE

## 2024-11-08 NOTE — TELEPHONE ENCOUNTER
RN notified pt to be Npo after midnight on Sunday and to hold her Mounjaro this Saturday. Pt verablized understanding,.

## 2024-11-11 ENCOUNTER — ANESTHESIA EVENT (OUTPATIENT)
Dept: RADIOLOGY | Facility: HOSPITAL | Age: 74
End: 2024-11-11
Payer: MEDICARE

## 2024-11-11 ENCOUNTER — ANESTHESIA (OUTPATIENT)
Dept: RADIOLOGY | Facility: HOSPITAL | Age: 74
End: 2024-11-11
Payer: MEDICARE

## 2024-11-11 ENCOUNTER — HOSPITAL ENCOUNTER (OUTPATIENT)
Dept: RADIOLOGY | Facility: HOSPITAL | Age: 74
Discharge: HOME | End: 2024-11-11
Payer: MEDICARE

## 2024-11-11 VITALS
SYSTOLIC BLOOD PRESSURE: 149 MMHG | RESPIRATION RATE: 14 BRPM | DIASTOLIC BLOOD PRESSURE: 70 MMHG | OXYGEN SATURATION: 99 % | HEART RATE: 68 BPM | TEMPERATURE: 97.5 F

## 2024-11-11 DIAGNOSIS — I25.82 CHRONIC TOTAL OCCLUSION OF CORONARY ARTERY: ICD-10-CM

## 2024-11-11 DIAGNOSIS — I25.10 CAD IN NATIVE ARTERY: ICD-10-CM

## 2024-11-11 LAB — GLUCOSE BLD MANUAL STRIP-MCNC: 85 MG/DL (ref 74–99)

## 2024-11-11 PROCEDURE — 3700000002 HC GENERAL ANESTHESIA TIME - EACH INCREMENTAL 1 MINUTE

## 2024-11-11 PROCEDURE — 3700000001 HC GENERAL ANESTHESIA TIME - INITIAL BASE CHARGE

## 2024-11-11 PROCEDURE — A9575 INJ GADOTERATE MEGLUMI 0.1ML: HCPCS | Performed by: NURSE PRACTITIONER

## 2024-11-11 PROCEDURE — A75561 CHG CARDIAC MRI W/W/O CONTRAST AND FURTHER SEQ

## 2024-11-11 PROCEDURE — 2500000005 HC RX 250 GENERAL PHARMACY W/O HCPCS

## 2024-11-11 PROCEDURE — 99100 ANES PT EXTEME AGE<1 YR&>70: CPT | Performed by: STUDENT IN AN ORGANIZED HEALTH CARE EDUCATION/TRAINING PROGRAM

## 2024-11-11 PROCEDURE — A75561 CHG CARDIAC MRI W/W/O CONTRAST AND FURTHER SEQ: Performed by: STUDENT IN AN ORGANIZED HEALTH CARE EDUCATION/TRAINING PROGRAM

## 2024-11-11 PROCEDURE — 2550000001 HC RX 255 CONTRASTS: Performed by: NURSE PRACTITIONER

## 2024-11-11 PROCEDURE — 7100000010 HC PHASE TWO TIME - EACH INCREMENTAL 1 MINUTE

## 2024-11-11 PROCEDURE — 75561 CARDIAC MRI FOR MORPH W/DYE: CPT

## 2024-11-11 PROCEDURE — 7100000009 HC PHASE TWO TIME - INITIAL BASE CHARGE

## 2024-11-11 PROCEDURE — 2500000004 HC RX 250 GENERAL PHARMACY W/ HCPCS (ALT 636 FOR OP/ED)

## 2024-11-11 PROCEDURE — 75561 CARDIAC MRI FOR MORPH W/DYE: CPT | Performed by: INTERNAL MEDICINE

## 2024-11-11 PROCEDURE — 82947 ASSAY GLUCOSE BLOOD QUANT: CPT

## 2024-11-11 RX ORDER — ALBUTEROL SULFATE 0.83 MG/ML
2.5 SOLUTION RESPIRATORY (INHALATION) ONCE AS NEEDED
Status: DISCONTINUED | OUTPATIENT
Start: 2024-11-11 | End: 2024-11-12 | Stop reason: HOSPADM

## 2024-11-11 RX ORDER — PHENYLEPHRINE 10 MG/250 ML(40 MCG/ML)IN 0.9 % SOD.CHLORIDE INTRAVENOUS
CONTINUOUS PRN
Status: DISCONTINUED | OUTPATIENT
Start: 2024-11-11 | End: 2024-11-11

## 2024-11-11 RX ORDER — SODIUM CHLORIDE, SODIUM LACTATE, POTASSIUM CHLORIDE, CALCIUM CHLORIDE 600; 310; 30; 20 MG/100ML; MG/100ML; MG/100ML; MG/100ML
50 INJECTION, SOLUTION INTRAVENOUS CONTINUOUS
Status: SHIPPED | OUTPATIENT
Start: 2024-11-11 | End: 2024-11-11

## 2024-11-11 RX ORDER — MIDAZOLAM HYDROCHLORIDE 1 MG/ML
INJECTION INTRAMUSCULAR; INTRAVENOUS AS NEEDED
Status: DISCONTINUED | OUTPATIENT
Start: 2024-11-11 | End: 2024-11-11

## 2024-11-11 RX ORDER — ONDANSETRON HYDROCHLORIDE 2 MG/ML
4 INJECTION, SOLUTION INTRAVENOUS ONCE AS NEEDED
Status: DISCONTINUED | OUTPATIENT
Start: 2024-11-11 | End: 2024-11-12 | Stop reason: HOSPADM

## 2024-11-11 RX ORDER — ROCURONIUM BROMIDE 10 MG/ML
INJECTION, SOLUTION INTRAVENOUS AS NEEDED
Status: DISCONTINUED | OUTPATIENT
Start: 2024-11-11 | End: 2024-11-11

## 2024-11-11 RX ORDER — PROPOFOL 10 MG/ML
INJECTION, EMULSION INTRAVENOUS AS NEEDED
Status: DISCONTINUED | OUTPATIENT
Start: 2024-11-11 | End: 2024-11-11

## 2024-11-11 RX ORDER — ETOMIDATE 2 MG/ML
INJECTION INTRAVENOUS AS NEEDED
Status: DISCONTINUED | OUTPATIENT
Start: 2024-11-11 | End: 2024-11-11

## 2024-11-11 RX ORDER — PHENYLEPHRINE HCL IN 0.9% NACL 0.4MG/10ML
SYRINGE (ML) INTRAVENOUS AS NEEDED
Status: DISCONTINUED | OUTPATIENT
Start: 2024-11-11 | End: 2024-11-11

## 2024-11-11 RX ORDER — LIDOCAINE IN NACL,ISO-OSMOT/PF 30 MG/3 ML
0.1 SYRINGE (ML) INJECTION ONCE
Status: DISCONTINUED | OUTPATIENT
Start: 2024-11-11 | End: 2024-11-12 | Stop reason: HOSPADM

## 2024-11-11 RX ORDER — GADOTERATE MEGLUMINE 376.9 MG/ML
30 INJECTION INTRAVENOUS
Status: COMPLETED | OUTPATIENT
Start: 2024-11-11 | End: 2024-11-11

## 2024-11-11 RX ORDER — ACETAMINOPHEN 325 MG/1
650 TABLET ORAL EVERY 4 HOURS PRN
Status: CANCELLED | OUTPATIENT
Start: 2024-11-11

## 2024-11-11 RX ORDER — FENTANYL CITRATE 50 UG/ML
INJECTION, SOLUTION INTRAMUSCULAR; INTRAVENOUS AS NEEDED
Status: DISCONTINUED | OUTPATIENT
Start: 2024-11-11 | End: 2024-11-11

## 2024-11-11 ASSESSMENT — PAIN SCALES - GENERAL
PAINLEVEL_OUTOF10: 0 - NO PAIN

## 2024-11-11 ASSESSMENT — PAIN - FUNCTIONAL ASSESSMENT
PAIN_FUNCTIONAL_ASSESSMENT: 0-10

## 2024-11-11 NOTE — ANESTHESIA POSTPROCEDURE EVALUATION
"Patient: Kerry Spence \"Charlene\"    Procedure Summary       Date: 11/11/24 Room / Location: [facility]    Anesthesia Start: 0831 Anesthesia Stop: 1043    Procedure: MR CARDIAC MORPHOLOGY AND FUNCTION W AND WO IV CONTRAST Diagnosis:       CAD in native artery      Chronic total occlusion of coronary artery      (viability -patient has 100% LCX lesion)    Scheduled Providers: FAHAD Lind; Valentin Johns MD Responsible Provider: Valentin Johns MD    Anesthesia Type: general ASA Status: 3            Anesthesia Type: general    Vitals Value Taken Time   /70 11/11/24 1217   Temp 36 11/11/24 1217   Pulse 68 11/11/24 1217   Resp 14 11/11/24 1217   SpO2 99 11/11/24 1217       Anesthesia Post Evaluation    Patient location during evaluation: PACU  Patient participation: complete - patient participated  Level of consciousness: awake and alert  Pain management: adequate  Airway patency: patent  Cardiovascular status: acceptable, hemodynamically stable and blood pressure returned to baseline  Respiratory status: acceptable  Hydration status: acceptable  Postoperative Nausea and Vomiting: none      There were no known notable events for this encounter.    "

## 2024-11-11 NOTE — ANESTHESIA PREPROCEDURE EVALUATION
"Patient: Kerry Spence \"Charlene\"    Procedure Information       Anesthesia Start Date/Time: 11/11/24 0831    Scheduled providers: FAHAD Lind; Valentin Johns MD    Procedure: MR CARDIAC MORPHOLOGY AND FUNCTION W AND WO IV CONTRAST    Location: [facility]            Relevant Problems   Cardiac   (+) CAD in native artery   (+) CHF (congestive heart failure)   (+) Chronic right heart failure   (+) Hyperlipidemia   (+) Paroxysmal atrial fibrillation (Multi)      Neuro   (+) Peripheral neuropathy      /Renal   (+) Urinary tract infection without hematuria      Endocrine   (+) Type 2 diabetes mellitus with other diabetic kidney complication   (+) Type 2 diabetes mellitus without complication, without long-term current use of insulin (Multi)      Hematology   (+) Other thrombophilia      HEENT   (+) Other acute recurrent sinusitis      ID   (+) Other acute osteomyelitis, right ankle and foot (Multi)   (+) Urinary tract infection without hematuria      GYN   (+) Malignant neoplasm of female breast, unspecified estrogen receptor status, unspecified laterality, unspecified site of breast       Clinical information reviewed:                   NPO Detail:  No data recorded     Physical Exam    Airway  Mallampati: II  TM distance: >3 FB     Cardiovascular   Rhythm: regular  Rate: normal     Dental - normal exam     Pulmonary   Breath sounds clear to auscultation     Abdominal          Anesthesia Plan    History of general anesthesia?: yes  History of complications of general anesthesia?: no    ASA 3     general   (Patient endorses history of claustrophobia. Presenting for cardiac MRI, which requires several breath holds throughout procedure. Explained options of sedation vs. GA. Plan for GA ETT - patient in agreement, discussed risks/benefits, answered all questions. )  intravenous induction   Anesthetic plan and risks discussed with patient.    Plan discussed with FAHAD and attending.  "

## 2024-11-11 NOTE — ANESTHESIA PROCEDURE NOTES
Peripheral IV  Date/Time: 11/11/2024 8:40 AM      Placement  Needle size: 20 G  Laterality: left  Location: antecubital  Site prep: alcohol  Technique: anatomical landmarks  Attempts: 1

## 2024-11-11 NOTE — ANESTHESIA PROCEDURE NOTES
Airway  Date/Time: 11/11/2024 8:52 AM  Urgency: elective    Airway not difficult    Staffing  Performed: FAHAD   Authorized by: Valentin Johns MD    Performed by: FAHAD Lind  Patient location during procedure: pre-op    Indications and Patient Condition  Indications for airway management: anesthesia  Spontaneous Ventilation: absent  Sedation level: deep  Preoxygenated: yes  Patient position: sniffing  Mask difficulty assessment: 1 - vent by mask    Final Airway Details  Final airway type: endotracheal airway      Successful airway: ETT  Cuffed: yes   Successful intubation technique: direct laryngoscopy  Endotracheal tube insertion site: oral  Blade: Taina  Blade size: #3  ETT size (mm): 7.0  Cormack-Lehane Classification: grade I - full view of glottis  Placement verified by: chest auscultation and capnometry   Measured from: lips  ETT to lips (cm): 20  Number of attempts at approach: 1

## 2024-11-13 ENCOUNTER — TELEPHONE (OUTPATIENT)
Dept: CARDIOLOGY | Facility: HOSPITAL | Age: 74
End: 2024-11-13
Payer: MEDICARE

## 2024-11-13 DIAGNOSIS — I25.10 CAD IN NATIVE ARTERY: Primary | ICD-10-CM

## 2024-11-13 NOTE — TELEPHONE ENCOUNTER
RN called pt at this time with results and plan, Pt verbalized understanding at this time.         ----- Message from Ramez Maya sent at 11/13/2024  9:42 AM EST -----  Her MRI shows good heart function - She needs PCI of the Cx and possible LAD - Lets refer her to Dr. Elizabeth Allison in [facility] as she will need special drug coated balloon for the intervention

## 2024-11-20 ENCOUNTER — OFFICE VISIT (OUTPATIENT)
Dept: WOUND CARE | Facility: CLINIC | Age: 74
End: 2024-11-20
Payer: MEDICARE

## 2024-11-20 PROCEDURE — 99213 OFFICE O/P EST LOW 20 MIN: CPT

## 2024-11-20 NOTE — PROGRESS NOTES
"Subjective   Charlene Spence is a 73 y.o. female who presents for follow-up of Type 2 diabetes mellitus.     She continues to work as a realtor.      She saw cardio at [facility] where it was deemed that she does not need pacemaker ICD placed.  She does have 100% blockage in one vessel which will need intervention.  This has not yet been scheduled.      She saw podiatry yesterday and goes every 3 weeks.  Wound is minimally open.  Orthotics have been helpful.      Known complications due to diabetes included right great toe amputaiton, CAD s/p 2 stents.      Cardiovascular risk factors include advanced age (older than 55 for men, 65 for women) and diabetes mellitus. The patient is not on an ACE inhibitor or angiotensin II receptor blocker.  The patient has not been previously hospitalized due to diabetic ketoacidosis.     Current symptoms/problems include none. Her clinical course has been stable.     The patient is currently checking the blood glucose multiple times per day.    Patient is using: continuous glucose monitor                                                        AHypoglycemia frequency: 0%  Hypoglycemia awareness: N/A      Current Medication Regimen  Mounjaro 12.5mg SQ once weekly      Denies exercise regimen    Review of Systems   All other systems reviewed and are negative.    Objective   /58 (BP Location: Left arm, Patient Position: Sitting, BP Cuff Size: Adult)   Pulse 78   Ht 1.727 m (5' 8\")   Wt 80.7 kg (178 lb)   BMI 27.06 kg/m²   Physical Exam  Vitals and nursing note reviewed.   Constitutional:       General: She is not in acute distress.     Appearance: Normal appearance. She is normal weight.   HENT:      Head: Normocephalic and atraumatic.      Nose: Nose normal.      Mouth/Throat:      Mouth: Mucous membranes are moist.   Eyes:      Extraocular Movements: Extraocular movements intact.   Cardiovascular:      Rate and Rhythm: Normal rate and regular rhythm.   Pulmonary:      Effort: " Pulmonary effort is normal.      Breath sounds: Normal breath sounds.   Musculoskeletal:         General: Normal range of motion.   Skin:     General: Skin is warm.   Neurological:      Mental Status: She is alert and oriented to person, place, and time.   Psychiatric:         Mood and Affect: Mood normal.         Lab Review  Glucose (mg/dL)   Date Value   09/27/2024 88   07/01/2024 84   06/21/2024 95     POC HEMOGLOBIN A1c (%)   Date Value   10/16/2024 5.4   12/14/2023 5.4   08/08/2023 5.2     Hemoglobin A1C (%)   Date Value   06/21/2024 5.8 (H)     Bicarbonate (mmol/L)   Date Value   09/27/2024 28   07/01/2024 29   06/21/2024 28     Urea Nitrogen (mg/dL)   Date Value   09/27/2024 10   07/01/2024 12   06/21/2024 12     Creatinine (mg/dL)   Date Value   09/27/2024 0.92   07/01/2024 0.88   06/21/2024 0.88     Lab Results   Component Value Date    CHOL 204 (H) 09/27/2024    CHOL 220 (H) 06/21/2024    CHOL 204 (H) 06/20/2023     Lab Results   Component Value Date    HDL 49.2 09/27/2024    HDL 47.4 06/21/2024    HDL 44.7 06/20/2023     Lab Results   Component Value Date    LDLCALC 135 (H) 09/27/2024    LDLCALC 148 (H) 06/21/2024     Lab Results   Component Value Date    TRIG 97 09/27/2024    TRIG 121 06/21/2024    TRIG 120 06/20/2023       Health Maintenance:   Foot Exam:  May 16, 2024  Eye Exam:  November 2023  Urine Albumin: April 10, 2024    CGM Interpretation/Plan  14 day CGM download was reviewed in detail as documented above and will be attached to chart.  A minimum of 72 hours of glucose data was used to inform the management plan outlined below.  100% of values is within target range.      Assessment/Plan   73 year old female presents for follow up for type II DM. Her blood pressure is at goal.     Type 2 diabetes mellitus, without long-term current use of insulin (Multi)    To continue Mounjaro 12.5mg/0.5 mL once weekly   No need to continue the use of the FreeStyle Nelson CGM given glycemic control   Counseled  that the time in range should be >70%, and thus you are at goal   Please stick to a low carb diet  Counseled that the goal A1C should be 7% or less and thus you are at goal   Counseled glycemic control is warranted to prevent microvascular complications  To continue routine follow up with podiatry   For follow up in 6 months

## 2024-11-20 NOTE — PATIENT INSTRUCTIONS
Thank you for choosing [facility]  for your health care needs.  If you have any questions, concerns or medical needs, please feel free to contact our office at [phone].    Please ensure you complete your blood work one week before the next scheduled appointment.    To continue Mounjaro 12.5mg/0.5 mL once weekly   No need to continue the use of the FreeStyle Nelson CGM given glycemic control   Counseled that the time in range should be >70%, and thus you are at goal   For follow up in 6 months

## 2024-11-21 ENCOUNTER — APPOINTMENT (OUTPATIENT)
Dept: ENDOCRINOLOGY | Facility: CLINIC | Age: 74
End: 2024-11-21
Payer: MEDICARE

## 2024-11-21 VITALS
DIASTOLIC BLOOD PRESSURE: 58 MMHG | SYSTOLIC BLOOD PRESSURE: 118 MMHG | HEIGHT: 68 IN | WEIGHT: 178 LBS | HEART RATE: 78 BPM | BODY MASS INDEX: 26.98 KG/M2

## 2024-11-21 DIAGNOSIS — E11.69 TYPE 2 DIABETES MELLITUS WITH OTHER SPECIFIED COMPLICATION, WITHOUT LONG-TERM CURRENT USE OF INSULIN: ICD-10-CM

## 2024-11-21 PROCEDURE — 3008F BODY MASS INDEX DOCD: CPT | Performed by: INTERNAL MEDICINE

## 2024-11-21 PROCEDURE — 3078F DIAST BP <80 MM HG: CPT | Performed by: INTERNAL MEDICINE

## 2024-11-21 PROCEDURE — 3044F HG A1C LEVEL LT 7.0%: CPT | Performed by: INTERNAL MEDICINE

## 2024-11-21 PROCEDURE — 1123F ACP DISCUSS/DSCN MKR DOCD: CPT | Performed by: INTERNAL MEDICINE

## 2024-11-21 PROCEDURE — 3074F SYST BP LT 130 MM HG: CPT | Performed by: INTERNAL MEDICINE

## 2024-11-21 PROCEDURE — 1159F MED LIST DOCD IN RCRD: CPT | Performed by: INTERNAL MEDICINE

## 2024-11-21 PROCEDURE — 95251 CONT GLUC MNTR ANALYSIS I&R: CPT | Performed by: INTERNAL MEDICINE

## 2024-11-21 PROCEDURE — 3061F NEG MICROALBUMINURIA REV: CPT | Performed by: INTERNAL MEDICINE

## 2024-11-21 PROCEDURE — 3050F LDL-C >= 130 MG/DL: CPT | Performed by: INTERNAL MEDICINE

## 2024-11-21 PROCEDURE — 1160F RVW MEDS BY RX/DR IN RCRD: CPT | Performed by: INTERNAL MEDICINE

## 2024-11-21 PROCEDURE — 99214 OFFICE O/P EST MOD 30 MIN: CPT | Performed by: INTERNAL MEDICINE

## 2024-11-21 PROCEDURE — G2211 COMPLEX E/M VISIT ADD ON: HCPCS | Performed by: INTERNAL MEDICINE

## 2024-11-21 RX ORDER — TIRZEPATIDE 12.5 MG/.5ML
12.5 INJECTION, SOLUTION SUBCUTANEOUS WEEKLY
Qty: 6 ML | Refills: 3 | Status: SHIPPED | OUTPATIENT
Start: 2024-11-21 | End: 2025-11-21

## 2024-11-26 NOTE — ASSESSMENT & PLAN NOTE
To continue Mounjaro 12.5mg/0.5 mL once weekly   No need to continue the use of the FreeStyle Nelson CGM given glycemic control   Counseled that the time in range should be >70%, and thus you are at goal   Please stick to a low carb diet  Counseled that the goal A1C should be 7% or less and thus you are at goal   Counseled glycemic control is warranted to prevent microvascular complications  To continue routine follow up with podiatry   For follow up in 6 months

## 2024-12-03 ENCOUNTER — TELEPHONE (OUTPATIENT)
Dept: PRIMARY CARE | Facility: CLINIC | Age: 74
End: 2024-12-03
Payer: MEDICARE

## 2024-12-03 DIAGNOSIS — Z12.31 VISIT FOR SCREENING MAMMOGRAM: Primary | ICD-10-CM

## 2024-12-03 NOTE — TELEPHONE ENCOUNTER
Lab Orders patient called requesting the following test     Breast screening- more extensive version please advise     Aortic Aneurysm screening    Carotid  Artery screening      Afib screening with electrocardiogram

## 2024-12-11 ENCOUNTER — OFFICE VISIT (OUTPATIENT)
Dept: WOUND CARE | Facility: CLINIC | Age: 74
End: 2024-12-11
Payer: MEDICARE

## 2024-12-11 PROCEDURE — 99213 OFFICE O/P EST LOW 20 MIN: CPT

## 2024-12-29 DIAGNOSIS — I48.0 PAROXYSMAL ATRIAL FIBRILLATION (MULTI): ICD-10-CM

## 2024-12-30 RX ORDER — APIXABAN 5 MG/1
5 TABLET, FILM COATED ORAL 2 TIMES DAILY
Qty: 180 TABLET | Refills: 3 | Status: SHIPPED | OUTPATIENT
Start: 2024-12-30

## 2025-01-02 DIAGNOSIS — G62.9 PERIPHERAL POLYNEUROPATHY: ICD-10-CM

## 2025-01-02 RX ORDER — GABAPENTIN 300 MG/1
CAPSULE ORAL
Qty: 540 CAPSULE | Refills: 3 | Status: SHIPPED | OUTPATIENT
Start: 2025-01-02

## 2025-01-15 ENCOUNTER — OFFICE VISIT (OUTPATIENT)
Dept: WOUND CARE | Facility: CLINIC | Age: 75
End: 2025-01-15
Payer: MEDICARE

## 2025-01-15 PROCEDURE — 99212 OFFICE O/P EST SF 10 MIN: CPT

## 2025-01-17 ENCOUNTER — APPOINTMENT (OUTPATIENT)
Dept: RADIOLOGY | Facility: HOSPITAL | Age: 75
End: 2025-01-17
Payer: MEDICARE

## 2025-01-17 ENCOUNTER — APPOINTMENT (OUTPATIENT)
Dept: CARDIOLOGY | Facility: HOSPITAL | Age: 75
End: 2025-01-17
Payer: MEDICARE

## 2025-01-17 ENCOUNTER — HOSPITAL ENCOUNTER (EMERGENCY)
Facility: HOSPITAL | Age: 75
Discharge: LEFT BEFORE DISCHARGE | End: 2025-01-17
Payer: MEDICARE

## 2025-01-17 ENCOUNTER — PHARMACY VISIT (OUTPATIENT)
Dept: PHARMACY | Facility: CLINIC | Age: 75
End: 2025-01-17
Payer: COMMERCIAL

## 2025-01-17 VITALS
OXYGEN SATURATION: 99 % | RESPIRATION RATE: 15 BRPM | TEMPERATURE: 96.4 F | DIASTOLIC BLOOD PRESSURE: 63 MMHG | SYSTOLIC BLOOD PRESSURE: 129 MMHG | WEIGHT: 180 LBS | HEART RATE: 60 BPM | HEIGHT: 68 IN | BODY MASS INDEX: 27.28 KG/M2

## 2025-01-17 DIAGNOSIS — R42 DIZZINESS: Primary | ICD-10-CM

## 2025-01-17 LAB
ALBUMIN SERPL BCP-MCNC: 4 G/DL (ref 3.4–5)
ALP SERPL-CCNC: 82 U/L (ref 33–136)
ALT SERPL W P-5'-P-CCNC: 10 U/L (ref 7–45)
ANION GAP SERPL CALC-SCNC: 11 MMOL/L (ref 10–20)
APTT PPP: 34 SECONDS (ref 27–38)
AST SERPL W P-5'-P-CCNC: 17 U/L (ref 9–39)
ATRIAL RATE: 56 BPM
BASOPHILS # BLD AUTO: 0.03 X10*3/UL (ref 0–0.1)
BASOPHILS NFR BLD AUTO: 0.6 %
BILIRUB SERPL-MCNC: 0.4 MG/DL (ref 0–1.2)
BNP SERPL-MCNC: 114 PG/ML (ref 0–99)
BUN SERPL-MCNC: 18 MG/DL (ref 6–23)
CALCIUM SERPL-MCNC: 8.8 MG/DL (ref 8.6–10.3)
CARDIAC TROPONIN I PNL SERPL HS: 4 NG/L (ref 0–13)
CARDIAC TROPONIN I PNL SERPL HS: 5 NG/L (ref 0–13)
CHLORIDE SERPL-SCNC: 103 MMOL/L (ref 98–107)
CO2 SERPL-SCNC: 25 MMOL/L (ref 21–32)
CREAT SERPL-MCNC: 0.84 MG/DL (ref 0.5–1.05)
EGFRCR SERPLBLD CKD-EPI 2021: 73 ML/MIN/1.73M*2
EOSINOPHIL # BLD AUTO: 0.16 X10*3/UL (ref 0–0.4)
EOSINOPHIL NFR BLD AUTO: 3.3 %
ERYTHROCYTE [DISTWIDTH] IN BLOOD BY AUTOMATED COUNT: 13 % (ref 11.5–14.5)
GLUCOSE SERPL-MCNC: 138 MG/DL (ref 74–99)
HCT VFR BLD AUTO: 33.1 % (ref 36–46)
HGB BLD-MCNC: 11.6 G/DL (ref 12–16)
IMM GRANULOCYTES # BLD AUTO: 0.02 X10*3/UL (ref 0–0.5)
IMM GRANULOCYTES NFR BLD AUTO: 0.4 % (ref 0–0.9)
INR PPP: 1.5 (ref 0.9–1.1)
LYMPHOCYTES # BLD AUTO: 0.9 X10*3/UL (ref 0.8–3)
LYMPHOCYTES NFR BLD AUTO: 18.7 %
MCH RBC QN AUTO: 29.7 PG (ref 26–34)
MCHC RBC AUTO-ENTMCNC: 35 G/DL (ref 32–36)
MCV RBC AUTO: 85 FL (ref 80–100)
MONOCYTES # BLD AUTO: 0.45 X10*3/UL (ref 0.05–0.8)
MONOCYTES NFR BLD AUTO: 9.4 %
NEUTROPHILS # BLD AUTO: 3.25 X10*3/UL (ref 1.6–5.5)
NEUTROPHILS NFR BLD AUTO: 67.6 %
NRBC BLD-RTO: 0 /100 WBCS (ref 0–0)
P AXIS: 66 DEGREES
PLATELET # BLD AUTO: 258 X10*3/UL (ref 150–450)
POTASSIUM SERPL-SCNC: 3.9 MMOL/L (ref 3.5–5.3)
PR INTERVAL: 187 MS
PROT SERPL-MCNC: 6.3 G/DL (ref 6.4–8.2)
PROTHROMBIN TIME: 16.5 SECONDS (ref 9.8–12.8)
Q ONSET: 252 MS
QRS COUNT: 10 BEATS
QRS DURATION: 98 MS
QT INTERVAL: 450 MS
QTC CALCULATION(BAZETT): 431 MS
QTC FREDERICIA: 437 MS
R AXIS: -39 DEGREES
RBC # BLD AUTO: 3.9 X10*6/UL (ref 4–5.2)
SODIUM SERPL-SCNC: 135 MMOL/L (ref 136–145)
T AXIS: 63 DEGREES
T OFFSET: 477 MS
VENTRICULAR RATE: 55 BPM
WBC # BLD AUTO: 4.8 X10*3/UL (ref 4.4–11.3)

## 2025-01-17 PROCEDURE — 2500000002 HC RX 250 W HCPCS SELF ADMINISTERED DRUGS (ALT 637 FOR MEDICARE OP, ALT 636 FOR OP/ED)

## 2025-01-17 PROCEDURE — 70496 CT ANGIOGRAPHY HEAD: CPT | Performed by: RADIOLOGY

## 2025-01-17 PROCEDURE — 70498 CT ANGIOGRAPHY NECK: CPT | Performed by: RADIOLOGY

## 2025-01-17 PROCEDURE — 85730 THROMBOPLASTIN TIME PARTIAL: CPT

## 2025-01-17 PROCEDURE — 83880 ASSAY OF NATRIURETIC PEPTIDE: CPT

## 2025-01-17 PROCEDURE — 84484 ASSAY OF TROPONIN QUANT: CPT

## 2025-01-17 PROCEDURE — 85610 PROTHROMBIN TIME: CPT

## 2025-01-17 PROCEDURE — 85025 COMPLETE CBC W/AUTO DIFF WBC: CPT

## 2025-01-17 PROCEDURE — 70496 CT ANGIOGRAPHY HEAD: CPT

## 2025-01-17 PROCEDURE — 70450 CT HEAD/BRAIN W/O DYE: CPT | Mod: 59,RCN

## 2025-01-17 PROCEDURE — 36415 COLL VENOUS BLD VENIPUNCTURE: CPT

## 2025-01-17 PROCEDURE — 80053 COMPREHEN METABOLIC PANEL: CPT

## 2025-01-17 PROCEDURE — 93005 ELECTROCARDIOGRAM TRACING: CPT

## 2025-01-17 PROCEDURE — RXMED WILLOW AMBULATORY MEDICATION CHARGE

## 2025-01-17 PROCEDURE — 99285 EMERGENCY DEPT VISIT HI MDM: CPT | Mod: 25

## 2025-01-17 PROCEDURE — 70450 CT HEAD/BRAIN W/O DYE: CPT | Mod: RCN | Performed by: RADIOLOGY

## 2025-01-17 PROCEDURE — 2550000001 HC RX 255 CONTRASTS

## 2025-01-17 RX ORDER — MECLIZINE HCL 12.5 MG 12.5 MG/1
25 TABLET ORAL ONCE
Status: DISCONTINUED | OUTPATIENT
Start: 2025-01-17 | End: 2025-01-17

## 2025-01-17 RX ORDER — MECLIZINE HCL 12.5 MG 12.5 MG/1
12.5 TABLET ORAL 3 TIMES DAILY PRN
Qty: 6 TABLET | Refills: 0 | Status: SHIPPED | OUTPATIENT
Start: 2025-01-17 | End: 2025-01-19

## 2025-01-17 RX ORDER — MECLIZINE HCL 12.5 MG 12.5 MG/1
25 TABLET ORAL ONCE
Status: COMPLETED | OUTPATIENT
Start: 2025-01-17 | End: 2025-01-17

## 2025-01-17 RX ADMIN — IOHEXOL 90 ML: 350 INJECTION, SOLUTION INTRAVENOUS at 13:05

## 2025-01-17 RX ADMIN — MECLIZINE 25 MG: 12.5 TABLET ORAL at 12:56

## 2025-01-17 ASSESSMENT — LIFESTYLE VARIABLES
HAVE YOU EVER FELT YOU SHOULD CUT DOWN ON YOUR DRINKING: NO
EVER HAD A DRINK FIRST THING IN THE MORNING TO STEADY YOUR NERVES TO GET RID OF A HANGOVER: NO
HAVE PEOPLE ANNOYED YOU BY CRITICIZING YOUR DRINKING: NO
TOTAL SCORE: 0
EVER FELT BAD OR GUILTY ABOUT YOUR DRINKING: NO

## 2025-01-17 ASSESSMENT — PAIN SCALES - GENERAL
PAINLEVEL_OUTOF10: 10 - WORST POSSIBLE PAIN
PAINLEVEL_OUTOF10: 0 - NO PAIN

## 2025-01-17 ASSESSMENT — PAIN DESCRIPTION - PAIN TYPE: TYPE: CHRONIC PAIN

## 2025-01-17 ASSESSMENT — PAIN - FUNCTIONAL ASSESSMENT
PAIN_FUNCTIONAL_ASSESSMENT: 0-10

## 2025-01-17 ASSESSMENT — PAIN DESCRIPTION - DESCRIPTORS: DESCRIPTORS: SHARP

## 2025-01-17 ASSESSMENT — PAIN DESCRIPTION - LOCATION: LOCATION: ARM

## 2025-01-17 ASSESSMENT — PAIN DESCRIPTION - FREQUENCY: FREQUENCY: CONSTANT/CONTINUOUS

## 2025-01-17 ASSESSMENT — PAIN DESCRIPTION - ORIENTATION: ORIENTATION: RIGHT;LEFT

## 2025-01-17 NOTE — DISCHARGE INSTRUCTIONS
As discussed in the ED, you are leaving against my advice prior to completion of your evaluation for dizziness , and by doing so are putting yourself at risk for the complications of undiagnosed and/or untreated dizziness which include but are not limited to stroke , death and permanent disability. Please reconsider. If you insist on leaving, you can return to the ED at any time if you change your mind about having your evaluation and treatment completed, and should return immediately if there is any change or worsening of your symptoms. Call your primary doctor as soon as you get home to arrange for a follow up appointment as soon as possible.

## 2025-01-17 NOTE — ED PROVIDER NOTES
HPI   Chief Complaint   Patient presents with    Dizziness       HPI  Patient is a 74-year-old female presents emergency room for dizziness.  Patient with a past medical history of type 2 diabetes, CAD, stroke.  Patient states she was at work sitting at her desk when she had sudden onset dizziness at 11 AM.  Patient feels like the room was spinning and is having a difficulty time walking.  Versus nausea but denies vomiting.  States she had a similar episode 4 years ago when they thought she was having a stroke.  States that she has been having neck pain and left arm pain at night for the past 2 weeks.  Has been going to a chiropractor for her pain.  Has been receiving neck manipulation and thinks she last went to the chiropractor 4 days ago.  Patient on Eliquis and last took it this morning.  He was having chills, chest pain, shortness of breath, double vision, difficulty swallowing.      Patient History   Past Medical History:   Diagnosis Date    Acute candidiasis of vulva and vagina 10/08/2015    Vaginal candidiasis    CVA (cerebral vascular accident) (Multi) 01/20/2023    Generalized skin eruption due to drugs and medicaments taken internally 10/08/2015    Generalized skin eruption due to drugs and medicaments    Other conditions influencing health status 12/29/2020    History of cough    Other specified postprocedural states     History of removal of Port-a-Cath    Other specified soft tissue disorders 06/01/2015    Swelling of right upper extremity    Perforation of intestine (nontraumatic) 12/15/2014    Bowel perforation    Personal history of malignant neoplasm of breast 04/16/2021    History of malignant neoplasm of breast    Personal history of other diseases of the circulatory system 03/03/2014    History of hypertension    Personal history of other specified conditions 08/22/2014    History of insomnia     Past Surgical History:   Procedure Laterality Date    CARDIAC CATHETERIZATION N/A 9/30/2024     Procedure: Left & Right Heart Cath w Angiography & LV;  Surgeon: Ramez Maya MD;  Location: [facility] Cardiac Cath Lab;  Service: Cardiovascular;  Laterality: N/A;    CHOLECYSTECTOMY  12/15/2014    Cholecystectomy    COLOSTOMY  12/15/2014    Colostomy    CT ANGIO AORTA AND BILATERAL ILIOFEMORAL RUN OFF INCLUDING WITHOUT CONTRAST IF PERFORMED  1/27/2023    CT AORTA AND BILATERAL ILIOFEMORAL RUNOFF ANGIOGRAM W AND/OR WO IV CONTRAST 1/27/2023 DOCTOR OFFICE LEGACY    MR HEAD ANGIO WO IV CONTRAST  10/11/2019    MR HEAD ANGIO WO IV CONTRAST 10/11/2019 [facility] CLINICAL LEGACY    MR NECK ANGIO WO IV CONTRAST  10/11/2019    MR NECK ANGIO WO IV CONTRAST 10/11/2019 [facility] CLINICAL LEGACY    OTHER SURGICAL HISTORY  12/30/2013    Modified Radical Mastectomy Right Breast    OTHER SURGICAL HISTORY  09/22/2017    Open Treatment Of Multiple Fractures Of The Radial Shaft    OTHER SURGICAL HISTORY  12/15/2014    Surgery Right Foot Amputation    TONSILLECTOMY  12/15/2014    Tonsillectomy     Family History   Problem Relation Name Age of Onset    COPD Mother      Aneurysm Father          AAA    Coronary artery disease Father      Hyperlipidemia Other Sibling      Social History     Tobacco Use    Smoking status: Never    Smokeless tobacco: Never   Vaping Use    Vaping status: Never Used   Substance Use Topics    Alcohol use: Not Currently     Comment: Occasional    Drug use: Never       Physical Exam   ED Triage Vitals [01/17/25 1203]   Temperature Heart Rate Respirations BP   35.8 °C (96.4 °F) 79 20 133/69      Pulse Ox Temp Source Heart Rate Source Patient Position   99 % Temporal Monitor Sitting      BP Location FiO2 (%)     Left arm --       Physical Exam  General: Resting comfortably in bed. No acute distress. Non-toxic.   Head: Atraumatic, normocephalic.   Eyes: Sclera anicteric.  ENT: Mucous membranes moist.   Heart: Bradycardic.  Regular rhythm.  Lungs: Clear to auscultation bilaterally.  Normal respiratory pattern without conversational  dyspnea or respiratory distress.   Abdomen: Soft, non-tender, non-distended, no guarding or rebound.   Neurologic: Awake and alert, normal speech and mental status. Moves all extremities equally well. No focal deficits or lateralizing signs.  No dysdiadochokinesia.  Unidirectional fatigable horizontal nystagmus.  No skew deviation on test of skew.  Corrective saccade on head impulse test.    Skin: Warm and dry, no appreciable rash.   Musculoskeletal: No peripheral edema.     NIH Stroke Scale  Time: 1245  Person Administering Scale: Mary Osborn MD    1a  Level of consciousness: 0=alert; keenly responsive   1b. LOC questions:  0=Performs both tasks correctly   1c. LOC commands: 0=Performs both tasks correctly   2.  Best Gaze: 0=normal   3. Visual: 0=No visual loss   4. Facial Palsy: 0=Normal symmetric movement   5a. Motor left arm: 0=No drift, limb holds 90 (or 45) degrees for full 10 seconds   5b.  Motor right arm: 0=No drift, limb holds 90 (or 45) degrees for full 10 seconds   6a. Motor left le=No drift, limb holds 90 (or 45) degrees for full 10 seconds   6b  Motor right le=No drift, limb holds 90 (or 45) degrees for full 10 seconds   7. Limb Ataxia: 0=Absent   8.  Sensory: 0=Normal; no sensory loss   9. Best Language:  0=No aphasia, normal   10. Dysarthria: 0=Normal   11. Extinction and Inattention: 0=No abnormality     Total:   0     VAN: Negative     ED Course & MDM   ED Course as of 25   1332 HEMOGLOBIN(!): 11.6  Stable [MARC]   1332 SODIUM(!): 135  Stable [MARC]   1332 INR(!): 1.5  Downtrending [MARC]   1332 BNP(!): 114  Improved compared to prior [MARC]   1332 CT brain attack head wo IV contrast  IMPRESSION:  No acute intracranial abnormality. [MARC]   1404 CT brain attack angio head and neck W and WO IV contrast  IMPRESSION:  1. No intracranial major vascular occlusion.  2. Approximately 50% short-segment atherosclerotic stenosis proximal  right internal carotid artery.    [MARC]      ED Course User Index  [MARC] Mary Osborn MD         Diagnoses as of 01/17/25 2155   Dizziness                 No data recorded     Santos Coma Scale Score: 15 (01/17/25 1207 : Jeff Diallo, EMT)                           Medical Decision Making  Patient is a 74 y.o. female who presents to the emergency department with chief complaint of dizziness. History of present illness as above. Chronic conditions impacting care include type 2 diabetes, CAD, stroke. Patient remained afebrile and hemodynamically stable while in the emergency department. They saturated well on room air.  On my evaluation, patient with NIH of 0, however her dizziness was persistent concerning for posterior circulation stroke.  Stroke alert activated.  The differential diagnosis associated with this patient's presentation includes, but are not limited too, hemorrhagic stroke, ischemic stroke, vertigo, electrolyte abnormalities, arrhythmia. Our workup consisted of ordering/reviewing CBC, CMP, BNP, PT/INR, troponin, EKG, CT head, CTA head neck.     External records reviewed:  Care everywhere reviewed for current medications and medical history.     Diagnostics interpreted by me include:  EKG sinus rhythm with left axis deviation and PVC, old anterior septal infarct.      ED Course as of 01/17/25 2155 Fri Jan 17, 2025   1332 HEMOGLOBIN(!): 11.6  Stable [MARC]   1332 SODIUM(!): 135  Stable [MARC]   1332 INR(!): 1.5  Downtrending [MARC]   1332 BNP(!): 114  Improved compared to prior [MARC]   1332 CT brain attack head wo IV contrast  IMPRESSION:  No acute intracranial abnormality. [MARC]   1404 CT brain attack angio head and neck W and WO IV contrast  IMPRESSION:  1. No intracranial major vascular occlusion.  2. Approximately 50% short-segment atherosclerotic stenosis proximal  right internal carotid artery.   [MARC]      ED Course User Index  [MARC] Mary Osborn MD         Diagnoses as of 01/17/25 2155   Dizziness     Patient did have  some improvement of her dizziness with meclizine, but continues to have some slight dizziness.  Recommended patient be admitted to the hospital for further evaluation of her symptoms and possible stroke including MRI.  Patient declining admission and would like to go home.  Alert and oriented x 4 has capacity make her own medical decisions.  Discussed risks of going home including recurrent strokes, falls, death, permanent disability.  Again highly recommend the patient be admitted to the hospital for further evaluation but patient would like to leave AGAINST MEDICAL ADVICE.  Patient referred to neurology and ENT.  Patient encouraged to return the emergency department as soon as possible.    Social determinants of health affecting care:  None    ED Medications managed:  Medications   meclizine (Antivert) tablet 25 mg (has no administration in time range)   iohexol (OMNIPaque) 350 mg iodine/mL solution 75 mL (has no administration in time range)         Procedure  Procedures     Mary Osborn MD  01/17/25 5063

## 2025-01-23 ENCOUNTER — TELEPHONE (OUTPATIENT)
Dept: PRIMARY CARE | Facility: CLINIC | Age: 75
End: 2025-01-23
Payer: MEDICARE

## 2025-01-23 DIAGNOSIS — R42 VERTIGO: Primary | ICD-10-CM

## 2025-01-23 NOTE — TELEPHONE ENCOUNTER
Patient called in stating that she went to the ER and they suggested she see PT for vertigo. Can she get an order for this and get scheduled?

## 2025-01-29 ENCOUNTER — APPOINTMENT (OUTPATIENT)
Dept: WOUND CARE | Facility: CLINIC | Age: 75
End: 2025-01-29
Payer: MEDICARE

## 2025-01-29 ENCOUNTER — APPOINTMENT (OUTPATIENT)
Dept: RADIOLOGY | Facility: HOSPITAL | Age: 75
End: 2025-01-29
Payer: MEDICARE

## 2025-02-03 ENCOUNTER — HOSPITAL ENCOUNTER (OUTPATIENT)
Dept: RADIOLOGY | Facility: HOSPITAL | Age: 75
Discharge: HOME | End: 2025-02-03
Payer: MEDICARE

## 2025-02-03 DIAGNOSIS — Z12.31 VISIT FOR SCREENING MAMMOGRAM: ICD-10-CM

## 2025-02-03 PROCEDURE — 77063 BREAST TOMOSYNTHESIS BI: CPT

## 2025-02-03 PROCEDURE — 77067 SCR MAMMO BI INCL CAD: CPT | Performed by: RADIOLOGY

## 2025-02-03 PROCEDURE — 77063 BREAST TOMOSYNTHESIS BI: CPT | Performed by: RADIOLOGY

## 2025-02-11 ENCOUNTER — APPOINTMENT (OUTPATIENT)
Dept: PHYSICAL THERAPY | Facility: HOSPITAL | Age: 75
End: 2025-02-11
Payer: MEDICARE

## 2025-02-11 LAB
ATRIAL RATE: 56 BPM
P AXIS: 66 DEGREES
PR INTERVAL: 187 MS
Q ONSET: 252 MS
QRS COUNT: 10 BEATS
QRS DURATION: 98 MS
QT INTERVAL: 450 MS
QTC CALCULATION(BAZETT): 431 MS
QTC FREDERICIA: 437 MS
R AXIS: -39 DEGREES
T AXIS: 63 DEGREES
T OFFSET: 477 MS
VENTRICULAR RATE: 55 BPM

## 2025-02-12 ENCOUNTER — TELEPHONE (OUTPATIENT)
Dept: ENDOCRINOLOGY | Facility: CLINIC | Age: 75
End: 2025-02-12
Payer: MEDICARE

## 2025-02-12 NOTE — TELEPHONE ENCOUNTER
Prior auth attempt on covermymeds for james 2 plus, insurance kicked back (excluded from patient insurance) on 2/12/2025 contacted [employer], was left on hold with [employer] for 35 minutes, ended call, informed patient of denial, patient will call insurance to see if sensors need to be switched to dexcom.

## 2025-02-19 ENCOUNTER — APPOINTMENT (OUTPATIENT)
Dept: PRIMARY CARE | Facility: CLINIC | Age: 75
End: 2025-02-19
Payer: MEDICARE

## 2025-02-19 VITALS
RESPIRATION RATE: 16 BRPM | DIASTOLIC BLOOD PRESSURE: 72 MMHG | BODY MASS INDEX: 27.28 KG/M2 | HEART RATE: 71 BPM | OXYGEN SATURATION: 98 % | SYSTOLIC BLOOD PRESSURE: 110 MMHG | HEIGHT: 68 IN | WEIGHT: 180 LBS

## 2025-02-19 DIAGNOSIS — I50.9 CONGESTIVE HEART FAILURE, UNSPECIFIED HF CHRONICITY, UNSPECIFIED HEART FAILURE TYPE: ICD-10-CM

## 2025-02-19 DIAGNOSIS — L97.513 NON-PRESSURE CHRONIC ULCER OF OTHER PART OF RIGHT FOOT WITH NECROSIS OF MUSCLE: ICD-10-CM

## 2025-02-19 DIAGNOSIS — G62.9 PERIPHERAL POLYNEUROPATHY: ICD-10-CM

## 2025-02-19 DIAGNOSIS — Z00.00 ROUTINE GENERAL MEDICAL EXAMINATION AT HEALTH CARE FACILITY: Primary | ICD-10-CM

## 2025-02-19 DIAGNOSIS — N18.31 STAGE 3A CHRONIC KIDNEY DISEASE (MULTI): ICD-10-CM

## 2025-02-19 DIAGNOSIS — Z89.431 ACQUIRED ABSENCE OF RIGHT FOOT (MULTI): ICD-10-CM

## 2025-02-19 DIAGNOSIS — C50.919 MALIGNANT NEOPLASM OF FEMALE BREAST, UNSPECIFIED ESTROGEN RECEPTOR STATUS, UNSPECIFIED LATERALITY, UNSPECIFIED SITE OF BREAST: ICD-10-CM

## 2025-02-19 DIAGNOSIS — E11.9 TYPE 2 DIABETES MELLITUS WITHOUT COMPLICATION, WITHOUT LONG-TERM CURRENT USE OF INSULIN (MULTI): ICD-10-CM

## 2025-02-19 PROBLEM — Z98.890 HISTORY OF SURGICAL PROCEDURE: Status: RESOLVED | Noted: 2023-01-20 | Resolved: 2025-02-19

## 2025-02-19 PROCEDURE — 3078F DIAST BP <80 MM HG: CPT | Performed by: FAMILY MEDICINE

## 2025-02-19 PROCEDURE — 3074F SYST BP LT 130 MM HG: CPT | Performed by: FAMILY MEDICINE

## 2025-02-19 PROCEDURE — 1170F FXNL STATUS ASSESSED: CPT | Performed by: FAMILY MEDICINE

## 2025-02-19 PROCEDURE — 1123F ACP DISCUSS/DSCN MKR DOCD: CPT | Performed by: FAMILY MEDICINE

## 2025-02-19 PROCEDURE — 1158F ADVNC CARE PLAN TLK DOCD: CPT | Performed by: FAMILY MEDICINE

## 2025-02-19 PROCEDURE — G0439 PPPS, SUBSEQ VISIT: HCPCS | Performed by: FAMILY MEDICINE

## 2025-02-19 PROCEDURE — 1036F TOBACCO NON-USER: CPT | Performed by: FAMILY MEDICINE

## 2025-02-19 PROCEDURE — 1159F MED LIST DOCD IN RCRD: CPT | Performed by: FAMILY MEDICINE

## 2025-02-19 RX ORDER — TIRZEPATIDE 15 MG/.5ML
1 INJECTION, SOLUTION SUBCUTANEOUS
COMMUNITY
Start: 2024-12-29

## 2025-02-19 RX ORDER — HYDROCODONE BITARTRATE AND ACETAMINOPHEN 5; 325 MG/1; MG/1
1 TABLET ORAL EVERY 8 HOURS PRN
Qty: 90 TABLET | Refills: 0 | Status: SHIPPED | OUTPATIENT
Start: 2025-02-19 | End: 2025-03-21

## 2025-02-19 RX ORDER — EVOLOCUMAB 140 MG/ML
INJECTION, SOLUTION SUBCUTANEOUS
COMMUNITY

## 2025-02-19 ASSESSMENT — ANXIETY QUESTIONNAIRES
2. NOT BEING ABLE TO STOP OR CONTROL WORRYING: NOT AT ALL
IF YOU CHECKED OFF ANY PROBLEMS ON THIS QUESTIONNAIRE, HOW DIFFICULT HAVE THESE PROBLEMS MADE IT FOR YOU TO DO YOUR WORK, TAKE CARE OF THINGS AT HOME, OR GET ALONG WITH OTHER PEOPLE: NOT DIFFICULT AT ALL
5. BEING SO RESTLESS THAT IT IS HARD TO SIT STILL: NOT AT ALL
GAD7 TOTAL SCORE: 0
3. WORRYING TOO MUCH ABOUT DIFFERENT THINGS: NOT AT ALL
1. FEELING NERVOUS, ANXIOUS, OR ON EDGE: NOT AT ALL
4. TROUBLE RELAXING: NOT AT ALL
7. FEELING AFRAID AS IF SOMETHING AWFUL MIGHT HAPPEN: NOT AT ALL
6. BECOMING EASILY ANNOYED OR IRRITABLE: NOT AT ALL

## 2025-02-19 ASSESSMENT — ENCOUNTER SYMPTOMS
LOSS OF SENSATION IN FEET: 0
BACK PAIN: 1
ARTHRALGIAS: 1
DEPRESSION: 0
NECK PAIN: 1
OCCASIONAL FEELINGS OF UNSTEADINESS: 0

## 2025-02-19 ASSESSMENT — ACTIVITIES OF DAILY LIVING (ADL)
BATHING: INDEPENDENT
TAKING_MEDICATION: INDEPENDENT
DOING_HOUSEWORK: INDEPENDENT
GROCERY_SHOPPING: INDEPENDENT
DRESSING: INDEPENDENT
MANAGING_FINANCES: INDEPENDENT

## 2025-02-19 ASSESSMENT — PATIENT HEALTH QUESTIONNAIRE - PHQ9
2. FEELING DOWN, DEPRESSED OR HOPELESS: NOT AT ALL
SUM OF ALL RESPONSES TO PHQ9 QUESTIONS 1 AND 2: 0
1. LITTLE INTEREST OR PLEASURE IN DOING THINGS: NOT AT ALL

## 2025-02-19 NOTE — ASSESSMENT & PLAN NOTE
GFR 62, stable and controlled  Orders:    Albumin-Creatinine Ratio, Urine Random; Future    Hemoglobin A1C; Future    CBC and Auto Differential; Future    Comprehensive Metabolic Panel; Future    Lipid Panel; Future    TSH with reflex to Free T4 if abnormal; Future

## 2025-02-19 NOTE — ASSESSMENT & PLAN NOTE
Now has new cardiologist.  Recently had catheterization, did not require stent placement  Orders:    Albumin-Creatinine Ratio, Urine Random; Future    Hemoglobin A1C; Future    CBC and Auto Differential; Future    Comprehensive Metabolic Panel; Future    Lipid Panel; Future    TSH with reflex to Free T4 if abnormal; Future

## 2025-02-19 NOTE — ASSESSMENT & PLAN NOTE
Stable and controlled.  Continues to have right foot ulcer for which she goes to wound care.  Pain controls occasional neuropathy in feet  Orders:    HYDROcodone-acetaminophen (Norco) 5-325 mg tablet; Take 1 tablet by mouth every 8 hours if needed for severe pain (7 - 10).

## 2025-02-19 NOTE — ASSESSMENT & PLAN NOTE
Stable and controlled  .  Sees endocrinology, lab work ordered, no longer on insulin  Orders:    Follow Up In [facility] - PCP - Established    Albumin-Creatinine Ratio, Urine Random; Future    Hemoglobin A1C; Future    CBC and Auto Differential; Future    Comprehensive Metabolic Panel; Future    Lipid Panel; Future    TSH with reflex to Free T4 if abnormal; Future    Follow Up In [facility] - PCP - Established; Future

## 2025-02-19 NOTE — ASSESSMENT & PLAN NOTE
Sees Dr. Verma, and she is staying about the same.   Orders:    HYDROcodone-acetaminophen (Norco) 5-325 mg tablet; Take 1 tablet by mouth every 8 hours if needed for severe pain (7 - 10).

## 2025-02-19 NOTE — PROGRESS NOTES
Subjective   Reason for Visit: Kerry Spence is an 74 y.o. female here for a Medicare Wellness visit.     Past Medical, Surgical, and Family History reviewed and updated in chart.    Reviewed all medications by prescribing practitioner or clinical pharmacist (such as prescriptions, OTCs, herbal therapies and supplements) and documented in the medical record.    HPI  Went to cath 2/11/2025, and thought to require stent, then went in and it was only 32% stenosed. Turner.  Patient Care Team:  Anca Jerry MD as PCP - General (Family Medicine)  Anca Jerry MD as PCP - [employer] PCP  Osvaldo Tejada MD as Consulting Physician (Hematology and Oncology)   Dr. Verma- right foot ulcer  Dr. Cardoso- diabetes, [facility]  OARRS:  Anca Jerry MD on 2/19/2025  3:55 PM  I have personally reviewed the OARRS report for Kerry Spence. I have considered the risks of abuse, dependence, addiction and diversion and I believe that it is clinically appropriate for Kerry Spence to be prescribed this medication    Is the patient prescribed a combination of a benzodiazepine and opioid?  No    Last Urine Drug Screen / ordered today: Yes  Recent Results (from the past 8760 hours)   Confirmation Opiate/Opioid/Benzo Prescription Compliance    Collection Time: 04/10/24  5:06 PM   Result Value Ref Range    Clonazepam <25 <25 ng/mL    7-Aminoclonazepam <25 <25 ng/mL    Alprazolam <25 <25 ng/mL    Alpha-Hydroxyalprazolam <25 <25 ng/mL    Midazolam <25 <25 ng/mL    Alpha-Hydroxymidazolam <25 <25 ng/mL    Chlordiazepoxide <25 <25 ng/mL    Diazepam <25 <25 ng/mL    Nordiazepam <25 <25 ng/mL    Temazepam <25 <25 ng/mL    Oxazepam <25 <25 ng/mL    Lorazepam <25 <25 ng/mL    Methadone <25 <25 ng/mL    EDDP <25 <25 ng/mL    6-Acetylmorphine <25 <25 ng/mL    Codeine <50 <50 ng/mL    Hydrocodone <25 <25 ng/mL    Hydromorphone <25 <25 ng/mL    Morphine  <50 <50 ng/mL    Norhydrocodone <25 <25 ng/mL    Noroxycodone <25  "<25 ng/mL    Oxycodone <25 <25 ng/mL    Oxymorphone <25 <25 ng/mL    Fentanyl <2.5 <2.5 ng/mL    Norfentanyl <2.5 <2.5 ng/mL    Tramadol <50 <50 ng/mL    O-Desmethyltramadol <50 <50 ng/mL    Zolpidem <25 <25 ng/mL    Zolpidem Metabolite (ZCA) <25 <25 ng/mL   Screen Opiate/Opioid/Benzo Prescription Compliance    Collection Time: 04/10/24  5:06 PM   Result Value Ref Range    Creatinine, Urine Random 60.7 20.0 - 320.0 mg/dL    Amphetamine Screen, Urine Presumptive Negative Presumptive Negative    Barbiturate Screen, Urine Presumptive Negative Presumptive Negative    Cannabinoid Screen, Urine Presumptive Negative Presumptive Negative    Cocaine Metabolite Screen, Urine Presumptive Negative Presumptive Negative    PCP Screen, Urine Presumptive Negative Presumptive Negative     Results are as expected.         Controlled Substance Agreement:  Date of the Last Agreement: 4/2024  Reviewed Controlled Substance Agreement including but not limited to the benefits, risks, and alternatives to treatment with a Controlled Substance medication(s).    Opioids:  What is the patient's goal of therapy? Improved pan from neuropathy  Is this being achieved with current treatment? Yes, takes 1 tab when needed, #90 per three months or more.     I have calculated the patient's Morphine Dose Equivalent (MED):   I have considered referral to Pain Management and/or a specialist, and do not feel it is necessary at this time.    I feel that it is clinically indicated to continue this current medication regimen after consideration of alternative therapies, and other non-opioid treatment.    Opioid Risk Screening:  No data recorded    Pain Assessment:  2/10  Review of Systems   Musculoskeletal:  Positive for arthralgias, back pain and neck pain.   All other systems reviewed and are negative.  Neuropathic pain from foot ulcer.     Objective   Vitals:  /72   Pulse 71   Resp 16   Ht 1.727 m (5' 8\")   Wt 81.6 kg (180 lb)   SpO2 98%   BMI " 27.37 kg/m²       Physical Exam  Vitals reviewed.   Constitutional:       Appearance: Normal appearance.   HENT:      Head: Normocephalic and atraumatic.      Right Ear: Tympanic membrane normal.      Left Ear: Tympanic membrane normal.      Nose: Nose normal.      Mouth/Throat:      Mouth: Mucous membranes are moist.      Pharynx: Oropharynx is clear.   Eyes:      Extraocular Movements: Extraocular movements intact.      Conjunctiva/sclera: Conjunctivae normal.      Pupils: Pupils are equal, round, and reactive to light.   Cardiovascular:      Rate and Rhythm: Normal rate and regular rhythm.      Pulses: Normal pulses.      Heart sounds: Normal heart sounds.   Pulmonary:      Effort: Pulmonary effort is normal.      Breath sounds: Normal breath sounds.   Abdominal:      General: Abdomen is flat. Bowel sounds are normal.      Palpations: Abdomen is soft.   Musculoskeletal:         General: Normal range of motion.      Cervical back: Normal range of motion and neck supple.   Skin:     General: Skin is warm and dry.      Capillary Refill: Capillary refill takes less than 2 seconds.   Neurological:      General: No focal deficit present.      Mental Status: She is alert and oriented to person, place, and time.   Psychiatric:         Mood and Affect: Mood normal.         Behavior: Behavior normal.       Assessment & Plan  Type 2 diabetes mellitus without complication, without long-term current use of insulin (Multi)  Stable and controlled  .  Sees endocrinology, lab work ordered, no longer on insulin  Orders:    Follow Up In [facility] - PCP - Established    Albumin-Creatinine Ratio, Urine Random; Future    Hemoglobin A1C; Future    CBC and Auto Differential; Future    Comprehensive Metabolic Panel; Future    Lipid Panel; Future    TSH with reflex to Free T4 if abnormal; Future    Follow Up In [facility] - PCP - Established; Future    Routine general medical examination at [facility]  facility    Orders:    1 Year Follow Up In [facility] - PCP - Wellness Exam; Future    Acquired absence of right foot (Multi)         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 Verma, and she is staying about the same.   Orders:    HYDROcodone-acetaminophen (Norco) 5-325 mg tablet; Take 1 tablet by mouth every 8 hours if needed for severe pain (7 - 10).    Congestive heart failure, unspecified HF chronicity, unspecified heart failure type  Now has new cardiologist.  Recently had catheterization, did not require stent placement  Orders:    Albumin-Creatinine Ratio, Urine Random; Future    Hemoglobin A1C; Future    CBC and Auto Differential; Future    Comprehensive Metabolic Panel; Future    Lipid Panel; Future    TSH with reflex to Free T4 if abnormal; Future    Malignant neoplasm of female breast, unspecified estrogen receptor status, unspecified laterality, unspecified site of breast  Mallory , once annually.  Currently in remission       Stage 3a chronic kidney disease (Multi)  GFR 62, stable and controlled  Orders:    Albumin-Creatinine Ratio, Urine Random; Future    Hemoglobin A1C; Future    CBC and Auto Differential; Future    Comprehensive Metabolic Panel; Future    Lipid Panel; Future    TSH with reflex to Free T4 if abnormal; Future    Peripheral polyneuropathy  Stable and controlled.  Continues to have right foot ulcer for which she goes to wound care.  Pain controls occasional neuropathy in feet  Orders:    HYDROcodone-acetaminophen (Norco) 5-325 mg tablet; Take 1 tablet by mouth every 8 hours if needed for severe pain (7 - 10).

## 2025-02-19 NOTE — PATIENT INSTRUCTIONS
It was good to see you  Labwork ordered for May  Refilled hydrocodone    Rafi is due to check PSA again 4/16/2025

## 2025-02-20 ENCOUNTER — CLINICAL SUPPORT (OUTPATIENT)
Dept: PHYSICAL THERAPY | Facility: HOSPITAL | Age: 75
End: 2025-02-20
Payer: MEDICARE

## 2025-02-20 DIAGNOSIS — R42 VERTIGO: Primary | ICD-10-CM

## 2025-02-20 DIAGNOSIS — M54.2 CHRONIC NECK PAIN: ICD-10-CM

## 2025-02-20 DIAGNOSIS — G89.29 CHRONIC NECK PAIN: ICD-10-CM

## 2025-02-20 PROCEDURE — 95992 CANALITH REPOSITIONING PROC: CPT | Mod: GP | Performed by: PHYSICAL THERAPIST

## 2025-02-20 PROCEDURE — 97162 PT EVAL MOD COMPLEX 30 MIN: CPT | Mod: GP | Performed by: PHYSICAL THERAPIST

## 2025-02-20 ASSESSMENT — PAIN SCALES - GENERAL: PAINLEVEL_OUTOF10: 6

## 2025-02-20 ASSESSMENT — ENCOUNTER SYMPTOMS
LOSS OF SENSATION IN FEET: 1
OCCASIONAL FEELINGS OF UNSTEADINESS: 1
DEPRESSION: 0

## 2025-02-20 ASSESSMENT — PAIN - FUNCTIONAL ASSESSMENT: PAIN_FUNCTIONAL_ASSESSMENT: 0-10
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
## 2025-02-24 ENCOUNTER — TREATMENT (OUTPATIENT)
Dept: PHYSICAL THERAPY | Facility: HOSPITAL | Age: 75
End: 2025-02-24
Payer: MEDICARE

## 2025-02-24 DIAGNOSIS — R42 VERTIGO: Primary | ICD-10-CM

## 2025-02-24 DIAGNOSIS — G89.29 CHRONIC NECK PAIN: ICD-10-CM

## 2025-02-24 DIAGNOSIS — M54.2 CHRONIC NECK PAIN: ICD-10-CM

## 2025-02-24 PROCEDURE — 97140 MANUAL THERAPY 1/> REGIONS: CPT | Mod: GP | Performed by: PHYSICAL THERAPIST

## 2025-02-24 PROCEDURE — 97112 NEUROMUSCULAR REEDUCATION: CPT | Mod: GP | Performed by: PHYSICAL THERAPIST

## 2025-02-24 ASSESSMENT — PAIN SCALES - GENERAL: PAINLEVEL_OUTOF10: 5 - MODERATE PAIN

## 2025-02-24 ASSESSMENT — PAIN - FUNCTIONAL ASSESSMENT: PAIN_FUNCTIONAL_ASSESSMENT: 0-10

## 2025-02-24 NOTE — PROGRESS NOTES
"Physical Therapy    Physical Therapy Treatment    Patient Name: Kerry Spence  MRN: [id number]  : 1950   Today's Date: 2025  Time Calculation  Start Time: 1300  Stop Time: 1345  Time Calculation (min): 45 min  PT Therapeutic Procedures Time Entry  Manual Therapy Time Entry: 25  Neuromuscular Re-Education Time Entry: 20           Visit Number: 2  Auth: BOMN, no auth    Current Problem  Problem List Items Addressed This Visit             ICD-10-CM    Chronic neck pain M54.2, G89.29    Vertigo - Primary R42        Subjective   Patient reports her neck is stiff and achy. She was more dizzy over the weekend. She describes it as \"spinny\" with changes of positions and just sitting here.     Precautions  Precautions  STEADI Fall Risk Score (The score of 4 or more indicates an increased risk of falling): No change  Precautions Comment: Fall Risk    Pain  Pain Assessment: 0-10  0-10 (Numeric) Pain Score: 5 - Moderate pain  Pain Type: Chronic pain  Pain Location: Neck  Pain Orientation: Right, Left    Objective   Performed BPPV reassessment with Frenzel Goggles and computer.       Treatments:  EXERCISES       Date 2025    VISIT # #1 #2 #      REPS REPS REPS   BPPV (+) right PSSC (-)          Horizontal canal R (+) sx (> Left) (-)    Horizontal canal L                  (+) sx (< Right) (-)          Nelia Hallpike R (+) Sx (greater) (-) but sx returning    Nelia Hallpike L (+) Sx  (-) but sx returning          Epley R x 2 = better NA    Epley L                  VOR x1 Horizontal      VOR x 1 Vertical                         Wall Falls            Balance Reach            Foam:            Walking w/ head tips/turns            STM Cervical (suboccipitals, paraspinals, UT)  25'                HEP -- Consider stretches NT        Assessment:     Patient reports 3/10 pain and dizziness after treatment today, but still momentary dizziness supine to sitting. No visual evidence this date of nystagmus, and no " support for active BPPV. Possible cervicogenic vs central vestibular dizziness.     Plan:  Assess response to STM. Consider stretches for neck and upper extremities. Recheck for active BPPV if indicated.   OP PT Plan  Treatment/Interventions: Canalith repositioning, Education/ Instruction, Neuromuscular re-education, Therapeutic exercises, Therapeutic activities, Manual therapy, Self care/ home management, Ultrasound, Mechanical traction, Gait training, Cryotherapy, Hot pack  PT Plan: Skilled PT  PT Frequency: 1 time per week  Duration: 8 weeks  Number of Treatments Authorized: LASHAWN  Rehab Potential: Excellent  Plan of Care Agreement: Patient    Goals:  Active       PT Problem       PT Goal 1       Start:  02/20/25    Expected End:  04/17/25       Independent home exercise program with 90% teach back capability by the patient           PT Goal 2       Start:  02/20/25    Expected End:  04/17/25       Improve NBOS EC to 30 seconds and SLS EO to >/= 5 seconds for improved steadiness with household tasks           PT Goal 3       Start:  02/20/25    Expected End:  04/17/25       Patient able to demonstrate VOR x1 at >/=130 bpm without increased symptoms for improved functionality and tolerance to movement with eye tracking.           PT Goal 4       Start:  02/20/25    Expected End:  04/17/25       Functional Outcome DHI score improves to </=  15 (initially 34/100)           Patient Stated Goal 1       Start:  02/20/25    Expected End:  04/17/25       Patient reports minimum to no vertigo with all changes of position and ADLs and 50% reduced neck pain with motion.

## 2025-03-05 ENCOUNTER — TREATMENT (OUTPATIENT)
Dept: PHYSICAL THERAPY | Facility: HOSPITAL | Age: 75
End: 2025-03-05
Payer: MEDICARE

## 2025-03-05 DIAGNOSIS — G89.29 CHRONIC NECK PAIN: ICD-10-CM

## 2025-03-05 DIAGNOSIS — R42 VERTIGO: ICD-10-CM

## 2025-03-05 DIAGNOSIS — M54.2 CHRONIC NECK PAIN: ICD-10-CM

## 2025-03-05 PROCEDURE — 97140 MANUAL THERAPY 1/> REGIONS: CPT | Mod: GP | Performed by: PHYSICAL THERAPIST

## 2025-03-05 PROCEDURE — 97110 THERAPEUTIC EXERCISES: CPT | Mod: GP | Performed by: PHYSICAL THERAPIST

## 2025-03-05 ASSESSMENT — PAIN SCALES - GENERAL: PAINLEVEL_OUTOF10: 0 - NO PAIN

## 2025-03-05 ASSESSMENT — PAIN - FUNCTIONAL ASSESSMENT: PAIN_FUNCTIONAL_ASSESSMENT: 0-10

## 2025-03-05 NOTE — PROGRESS NOTES
Physical Therapy    Physical Therapy Treatment    Patient Name: Kerry Spence  MRN: [id number]  : 1950   Today's Date: 3/5/2025  Time Calculation  Start Time: 1255  Stop Time: 1345  Time Calculation (min): 50 min  PT Therapeutic Procedures Time Entry  Manual Therapy Time Entry: 35  Therapeutic Exercise Time Entry: 10        Non-Billable Time  Non-billable time: 5  Visit Number: 3  Auth: BOMN, no auth    Current Problem  Problem List Items Addressed This Visit             ICD-10-CM    Chronic neck pain M54.2, G89.29    Vertigo R42        Subjective   Patient reports that the last session really helped her neck pain for the rest of the day. She still feels like her neck is more loose than it was. She still has some dizziness but has not had a vertigo spell.     Precautions  Precautions  STEADI Fall Risk Score (The score of 4 or more indicates an increased risk of falling): No change  Precautions Comment: Fall Risk    Pain  Pain Assessment: 0-10  0-10 (Numeric) Pain Score: 0 - No pain (dizziness and pain only at end range of neck rotation)  Pain Location: Neck  Pain Orientation: Right, Left    Objective     Cervical ROM: (WNL unless noted)   Degrees   Flexion    Extension     RIGHT LEFT   Side bend     Rotation 50 58        Treatments:  EXERCISES       Date 2025 2025 3/5/2025   VISIT # #1 #2 #3      REPS REPS REPS   BPPV (+) right PSSC (-) (-)         Horizontal canal R (+) sx (> Left) (-)    Horizontal canal L                  (+) sx (< Right) (-)          [place] Hallpike R (+) Sx (greater) (-) but sx returning    [place] Hallpike L (+) Sx  (-) but sx returning          Epley R x 2 = better NA    Epley L                  VOR x1 Horizontal   Next time   VOR x 1 Vertical    Next time         C1 Towel rotation   2x5         Wall Falls            Balance Reach            Foam:            Walking w/ head tips/turns            Contract/Relax Technique UT   X3 ea         STM Cervical (suboccipitals, paraspinals,  UT, scalenes, )  25' 30'         Stretching:      UT   Next time   Scalene   Next time   Levator   Next time         HEP -- Consider stretches NT Towel Rotations for C1     Access Code: [email or web address]  URL: [email or web address]  Date: 03/05/2025  Prepared by: Matthew Patel    Exercises  - Seated Assisted Cervical Rotation with Towel  - 1 x daily - 2 sets - 5 reps    Assessment:   Patient reports 0/10 pain after treatment today. Patient noted that she had just a little bit of dizziness coming up from lying on the table, but very minimal and overall felt a lot better after treatment today.     Plan:  OP PT Plan  Treatment/Interventions: Canalith repositioning, Education/ Instruction, Neuromuscular re-education, Therapeutic exercises, Therapeutic activities, Manual therapy, Self care/ home management, Ultrasound, Mechanical traction, Gait training, Cryotherapy, Hot pack  PT Plan: Skilled PT  PT Frequency: 1 time per week  Duration: 8 weeks  Number of Treatments Authorized: BOMN  Rehab Potential: Excellent  Plan of Care Agreement: Patient    Goals:  Active       PT Problem       PT Goal 1       Start:  02/20/25    Expected End:  04/17/25       Independent home exercise program with 90% teach back capability by the patient           PT Goal 2       Start:  02/20/25    Expected End:  04/17/25       Improve NBOS EC to 30 seconds and SLS EO to >/= 5 seconds for improved steadiness with household tasks           PT Goal 3       Start:  02/20/25    Expected End:  04/17/25       Patient able to demonstrate VOR x1 at >/=130 bpm without increased symptoms for improved functionality and tolerance to movement with eye tracking.           PT Goal 4       Start:  02/20/25    Expected End:  04/17/25       Functional Outcome DHI score improves to </=  15 (initially 34/100)           Patient Stated Goal 1       Start:  02/20/25    Expected End:  04/17/25       Patient reports minimum to no vertigo with all changes  of position and ADLs and 50% reduced neck pain with motion.

## 2025-03-12 ENCOUNTER — TREATMENT (OUTPATIENT)
Dept: PHYSICAL THERAPY | Facility: HOSPITAL | Age: 75
End: 2025-03-12
Payer: MEDICARE

## 2025-03-12 DIAGNOSIS — G89.29 CHRONIC NECK PAIN: ICD-10-CM

## 2025-03-12 DIAGNOSIS — M54.2 CHRONIC NECK PAIN: ICD-10-CM

## 2025-03-12 DIAGNOSIS — R42 VERTIGO: Primary | ICD-10-CM

## 2025-03-12 PROCEDURE — 97140 MANUAL THERAPY 1/> REGIONS: CPT | Mod: GP | Performed by: PHYSICAL THERAPIST

## 2025-03-12 PROCEDURE — 97112 NEUROMUSCULAR REEDUCATION: CPT | Mod: GP | Performed by: PHYSICAL THERAPIST

## 2025-03-12 ASSESSMENT — PAIN - FUNCTIONAL ASSESSMENT: PAIN_FUNCTIONAL_ASSESSMENT: 0-10

## 2025-03-12 ASSESSMENT — PAIN SCALES - GENERAL: PAINLEVEL_OUTOF10: 0 - NO PAIN

## 2025-03-12 NOTE — PROGRESS NOTES
"Physical Therapy    Physical Therapy Treatment    Patient Name: Kerry Spence  MRN: [id number]  : 1950   Today's Date: 3/12/2025  Time Calculation  Start Time: 1350  Stop Time: 1435  Time Calculation (min): 45 min  PT Therapeutic Procedures Time Entry  Manual Therapy Time Entry: 30  Neuromuscular Re-Education Time Entry: 10  Therapeutic Exercise Time Entry: 5           Visit Number: 4  Auth: BOMN    Current Problem  Problem List Items Addressed This Visit             ICD-10-CM    Chronic neck pain M54.2, G89.29    Vertigo - Primary R42        Subjective   Patient reports that her neck is feeling a lot better. She still has some dizziness symptoms, describing as just not feeling clear and having to focus to sit straight.      Precautions  Precautions  STEADI Fall Risk Score (The score of 4 or more indicates an increased risk of falling): No change  Precautions Comment: Fall Risk    Pain  Pain Assessment: 0-10  0-10 (Numeric) Pain Score: 0 - No pain (presently)  Pain Location: Neck  Pain Orientation: Right, Left    Objective   Cervical rotation nearly symmetrical, but patient notes tightness with turning right.      Treatments:  EXERCISES       Date 2025 3/5/2025 3/12/2025   VISIT # #2 #3 #4      REPS REPS REPS   BPPV (-) (-)          Horizontal canal R (-)     Horizontal canal L                  (-)           [place] Hallpike R (-) but sx returning     [place] Hallpike L (-) but sx returning           Epley R NA     Epley L                  VOR x1 Horizontal  Next time 30\" x 2   VOR x 1 Vertical   Next time 30\" x 2         C1 Towel rotation  2x5 2x5         Wall Falls            Balance Reach            Foam:            Walking w/ head tips/turns            Contract/Relax Technique UT  X3 ea          STM Cervical (suboccipitals, paraspinals, UT, scalenes, ) 25' 30' 30'         Stretching:      UT  Next time Contract Relax x 3 ea   Scalene      Levator            HEP Consider stretches NT Towel Rotations for C1 " Reviewed C1 Towel  Added VOR     Access Code: [email or web address]  URL: [email or web address]  Date: 03/12/2025  Prepared by: Matthew Patel00    Exercises  - Seated Gaze Stabilization with Head Rotation  - 2 x daily - 10 reps - 1 sec hold  - Seated Gaze Stabilization with Head Nod  - 2 x daily - 10 reps - 1 sec hold    Assessment:   Patient reports 0/10 pain after treatment today. Patient noted mild dizziness with returning to sitting from supine that passed fairly quickly. Patient may require another BPPV screening with goggles. Patient also required multiple verbal and tactile cues for proper technique for towel rotation exercise. Patient was relatively competent with VOR exercises but performed them at very slow rates.    Plan:  OP PT Plan  Treatment/Interventions: Canalith repositioning, Education/ Instruction, Neuromuscular re-education, Therapeutic exercises, Therapeutic activities, Manual therapy, Self care/ home management, Ultrasound, Mechanical traction, Gait training, Cryotherapy, Hot pack  PT Plan: Skilled PT  PT Frequency: 1 time per week  Duration: 8 weeks  Number of Treatments Authorized: LASHAWN  Rehab Potential: Excellent  Plan of Care Agreement: Patient    Goals:  Active       PT Problem       PT Goal 1 (Progressing)       Start:  02/20/25    Expected End:  04/17/25       Independent home exercise program with 90% teach back capability by the patient           PT Goal 2       Start:  02/20/25    Expected End:  04/17/25       Improve NBOS EC to 30 seconds and SLS EO to >/= 5 seconds for improved steadiness with household tasks           PT Goal 3       Start:  02/20/25    Expected End:  04/17/25       Patient able to demonstrate VOR x1 at >/=130 bpm without increased symptoms for improved functionality and tolerance to movement with eye tracking.           PT Goal 4       Start:  02/20/25    Expected End:  04/17/25       Functional Outcome DHI score improves to </=  15 (initially  34/100)           Patient Stated Goal 1 (Progressing)       Start:  02/20/25    Expected End:  04/17/25       Patient reports minimum to no vertigo with all changes of position and ADLs and 50% reduced neck pain with motion.

## 2025-03-17 ENCOUNTER — TREATMENT (OUTPATIENT)
Dept: PHYSICAL THERAPY | Facility: HOSPITAL | Age: 75
End: 2025-03-17
Payer: MEDICARE

## 2025-03-17 DIAGNOSIS — R42 VERTIGO: ICD-10-CM

## 2025-03-17 DIAGNOSIS — M54.2 CHRONIC NECK PAIN: ICD-10-CM

## 2025-03-17 DIAGNOSIS — G89.29 CHRONIC NECK PAIN: ICD-10-CM

## 2025-03-17 PROCEDURE — 97112 NEUROMUSCULAR REEDUCATION: CPT | Mod: GP,CQ

## 2025-03-17 PROCEDURE — 97110 THERAPEUTIC EXERCISES: CPT | Mod: GP,CQ

## 2025-03-17 PROCEDURE — 97140 MANUAL THERAPY 1/> REGIONS: CPT | Mod: GP,CQ

## 2025-03-17 ASSESSMENT — PAIN - FUNCTIONAL ASSESSMENT: PAIN_FUNCTIONAL_ASSESSMENT: 0-10

## 2025-03-17 ASSESSMENT — PAIN SCALES - GENERAL: PAINLEVEL_OUTOF10: 4

## 2025-03-17 NOTE — PROGRESS NOTES
"Physical Therapy    Physical Therapy Treatment    Patient Name: Kerry Spence  MRN: [id number]  : 1950   Today's Date: 3/17/2025  Time Calculation  Start Time: 1300  Stop Time: 1345  Time Calculation (min): 45 min     PT Therapeutic Procedures Time Entry  Manual Therapy Time Entry: 10  Neuromuscular Re-Education Time Entry: 15  Therapeutic Exercise Time Entry: 20                 Visit Number: 5    Current Problem  Problem List Items Addressed This Visit             ICD-10-CM    Chronic neck pain M54.2, G89.29    Vertigo R42        Subjective   Pt reports no recent falls and moderate compliance with her HEP. Pt reports she experiences flare-ups in her dizzy spells when she bends over or looks down.  Precautions  Precautions  Precautions Comment: Fall Risk,hx of breast cancer, hx of stroke, cardiac hx    Pain  Pain Assessment: 0-10  0-10 (Numeric) Pain Score: 4  Pain Location: Neck  Pain Orientation: Right, Left      Objective      Treatments:  EXERCISES        Date 2025 3/5/2025 3/12/2025 3/17/2025   VISIT # #2 #3 #4 #5      REPS REPS REPS REPS   BPPV (-) (-)            Horizontal canal R (-)      Horizontal canal L                  (-)             [place]elia Hallpike R (-) but sx returning      [place] Hallpike L (-) but sx returning             Epley R NA      Epley L                     VOR x1 Horizontal  Next time 30\" x 2  30sec x2   VOR x 1 Vertical   Next time 30\" x 2  30sec x2          C1 Towel rotation  2x5 2x5 X5 Derek          Wall Falls              Balance Reach              Foam:              Walking w/ head tips/turns              Contract/Relax Technique UT  X3 ea  3x30 sec Derek           STM Cervical (suboccipitals, paraspinals, UT, scalenes, ) 25' 30' 30' 10min           Stretching:       UT  Next time Contract Relax x 3 ea Contract Relax x 3 ea      Scalene    3x30 sec Derek    Levator    3x30 sec Derek           HEP Consider stretches NT Towel Rotations for C1 Reviewed C1 Towel  Added VOR  "       Assessment:  Pt tolerated treatment with reporting of increase in vertigo symptoms when looking downward.  Pt required tactile and verbal cues with most stretches and exercises. The supervising therapist was present for the entire session and made all clinical decisions.         Plan:  Continue to decrease vertigo symptoms, improve cervical ROM, decrease pain.  OP PT Plan  Treatment/Interventions: Canalith repositioning, Education/ Instruction, Neuromuscular re-education, Therapeutic exercises, Therapeutic activities, Manual therapy, Self care/ home management, Ultrasound, Mechanical traction, Gait training, Cryotherapy, Hot pack  PT Plan: Skilled PT  PT Frequency: 1 time per week  Duration: 8 weeks  Number of Treatments Authorized: LASHAWN  Rehab Potential: Excellent  Plan of Care Agreement: Patient    Goals:  Active       PT Problem       PT Goal 1 (Progressing)       Start:  02/20/25    Expected End:  04/17/25       Independent home exercise program with 90% teach back capability by the patient           PT Goal 2       Start:  02/20/25    Expected End:  04/17/25       Improve NBOS EC to 30 seconds and SLS EO to >/= 5 seconds for improved steadiness with household tasks           PT Goal 3       Start:  02/20/25    Expected End:  04/17/25       Patient able to demonstrate VOR x1 at >/=130 bpm without increased symptoms for improved functionality and tolerance to movement with eye tracking.           PT Goal 4       Start:  02/20/25    Expected End:  04/17/25       Functional Outcome DHI score improves to </=  15 (initially 34/100)           Patient Stated Goal 1 (Progressing)       Start:  02/20/25    Expected End:  04/17/25       Patient reports minimum to no vertigo with all changes of position and ADLs and 50% reduced neck pain with motion.

## 2025-03-24 ENCOUNTER — APPOINTMENT (OUTPATIENT)
Dept: PHYSICAL THERAPY | Facility: HOSPITAL | Age: 75
End: 2025-03-24
Payer: MEDICARE

## 2025-03-26 DIAGNOSIS — E11.69 TYPE 2 DIABETES MELLITUS WITH OTHER SPECIFIED COMPLICATION, WITHOUT LONG-TERM CURRENT USE OF INSULIN: Primary | ICD-10-CM

## 2025-03-26 DIAGNOSIS — E11.29 DIABETES MELLITUS WITH PROTEINURIA (MULTI): ICD-10-CM

## 2025-03-26 DIAGNOSIS — R80.9 DIABETES MELLITUS WITH PROTEINURIA (MULTI): ICD-10-CM

## 2025-03-26 NOTE — TELEPHONE ENCOUNTER
Next appt 05/27/2025    Pt stated that she needs refills on her Mounjaro and Nelson 2 sensors. To be sent to the [facility] in [place].

## 2025-03-27 RX ORDER — FLASH GLUCOSE SENSOR
1 KIT MISCELLANEOUS
Qty: 6 EACH | Refills: 3 | Status: SHIPPED | OUTPATIENT
Start: 2025-03-27 | End: 2026-03-27

## 2025-03-27 RX ORDER — TIRZEPATIDE 15 MG/.5ML
15 INJECTION, SOLUTION SUBCUTANEOUS
Qty: 2 ML | Refills: 5 | Status: SHIPPED | OUTPATIENT
Start: 2025-03-30 | End: 2025-09-26

## 2025-03-31 ENCOUNTER — TREATMENT (OUTPATIENT)
Dept: PHYSICAL THERAPY | Facility: HOSPITAL | Age: 75
End: 2025-03-31
Payer: MEDICARE

## 2025-03-31 DIAGNOSIS — G89.29 CHRONIC NECK PAIN: ICD-10-CM

## 2025-03-31 DIAGNOSIS — R42 VERTIGO: ICD-10-CM

## 2025-03-31 DIAGNOSIS — M54.2 CHRONIC NECK PAIN: ICD-10-CM

## 2025-03-31 PROCEDURE — 97530 THERAPEUTIC ACTIVITIES: CPT | Mod: GP | Performed by: PHYSICAL THERAPIST

## 2025-03-31 PROCEDURE — 97110 THERAPEUTIC EXERCISES: CPT | Mod: GP | Performed by: PHYSICAL THERAPIST

## 2025-03-31 ASSESSMENT — PAIN - FUNCTIONAL ASSESSMENT: PAIN_FUNCTIONAL_ASSESSMENT: 0-10

## 2025-03-31 ASSESSMENT — PAIN SCALES - GENERAL: PAINLEVEL_OUTOF10: 5 - MODERATE PAIN

## 2025-03-31 NOTE — PROGRESS NOTES
Physical Therapy    Physical Therapy Progress Note/Hold    Patient Name: Kerry Spence  MRN: [id number]  : 1950   Today's Date: 3/31/2025  Time Calculation  Start Time: 1304  Stop Time: 1400  Time Calculation (min): 56 min  PT Therapeutic Procedures Time Entry  Therapeutic Exercise Time Entry:   Therapeutic Activity Time Entry: 30           Visit Number: 6  Auth: no auth    Current Problem  Problem List Items Addressed This Visit             ICD-10-CM    Chronic neck pain M54.2, G89.29    Vertigo R42        Subjective   Patient reports stress continues to drive her neck pain. She reports that she has not had any dizziness attacks since starting therapy. She still feels dizzy with changes of position.    Precautions  Precautions  STEADI Fall Risk Score (The score of 4 or more indicates an increased risk of falling): No change  Precautions Comment: Fall Risk,hx of breast cancer, hx of stroke, cardiac hx    Pain  Pain Assessment: 0-10  0-10 (Numeric) Pain Score: 5 - Moderate pain  Pain Location: Neck  Pain Orientation: Right, Left    Objective   Cervical ROM: (WNL unless noted)   Degrees   Flexion 30   Extension 50    RIGHT LEFT   Side bend 12 15   Rotation 50 50     Vertigo:     Spontaneous Nystagmus: NA  Gaze evoked nystagmus: NA    BPPV Test Symptoms Nystagmus Direction Latency   (in sec) Duration (in sec)   [place]-Hallpike Right (+) (greatest) Not observed 3 30   [place]-Hallpike Left (+) (moderate) Not observed 3 15   Roll Test Right       Roll Test Left         Oculomotor exam:  Test Abnormal Y/N Dizziness Rating (0-10) Symptoms/observation   Smooth Pursuits  No 0    Saccades Horizontal No 0    Saccades Vertical No 0    VOR-Horizontal Yes 3    VOR-Vertical Yes 3    VOR Cancellation Yes 6    Convergence No 0      Balance:   Test (eyes open)  Stable surface (in sec)  Foam (in sec)    Romberg  30 NT    Test (eyes closed) -- --    Romberg 30 w/ sway NT     Additional Tests Stable Surface Foam    SLS R     Unable    L    Unable R    NT      L     NT        Outcome Measures:  Other Measures  Dizziness Handicap Inventory: 10    Treatments:  Therapeutic Activities: balance testing, cervical motion testing, VNG for [place] Hallpike  Therapeutic Exercises: review and revise HEP    Assessment:   Patient reports 3/10 neck pain after treatment today. Patient is demonstrating improved cervical motion. Patient does not have nystagmus with testing or during reported dizziness, but continues to have reported symptoms in [place]-Hallpike and up from [place]-Hallpike maneuvers, right more than left today. Patient's balance has improved in areas testing but is somewhat limited by right foot amputation. This also impacts the patient's proprioception and ambulation. Patient has not fallen since starting therapy. Compliance with independent HEP is questionable. Patient required significant time instructing on final HEP and POC.    Plan:  Patient to work on indep HEP with cervicogenic emphasis and call for appointment if symptoms do not improve or worsen.    OP PT Plan  Treatment/Interventions: Canalith repositioning, Education/ Instruction, Neuromuscular re-education, Therapeutic exercises, Therapeutic activities, Manual therapy, Self care/ home management, Ultrasound, Mechanical traction, Gait training, Cryotherapy, Hot pack  PT Plan: Skilled PT  PT Frequency:  (PRN)  Duration: 4 more weeks  Number of Treatments Authorized: LASHAWN  Rehab Potential: Excellent  Plan of Care Agreement: Patient    Goals:  Active       PT Problem       PT Goal 1 (Progressing)       Start:  02/20/25    Expected End:  04/17/25       Independent home exercise program with 90% teach back capability by the patient        Goal Note       Challenged with technique of towel rotations, uncertain of compliance              PT Goal 2 (Progressing)       Start:  02/20/25    Expected End:  04/17/25       Improve NBOS EC to 30 seconds and SLS EO to >/= 5 seconds for improved steadiness with  household tasks        Goal Note       Not met for SLS              PT Goal 3 (Progressing)       Start:  02/20/25    Expected End:  04/17/25       Patient able to demonstrate VOR x1 at >/=130 bpm without increased symptoms for improved functionality and tolerance to movement with eye tracking.        Goal Note       Not met for speed requirement              PT Goal 4 (Met)       Start:  02/20/25    Expected End:  04/17/25    Resolved:  03/31/25    Functional Outcome DHI score improves to </=  15 (initially 34/100)           Patient Stated Goal 1 (Progressing)       Start:  02/20/25    Expected End:  04/17/25       Patient reports minimum to no vertigo with all changes of position and ADLs and 50% reduced neck pain with motion.      Goal Note       Reduced neck pain, still having vertigo with changes of position without evidence of nystagmus

## 2025-05-08 DIAGNOSIS — I50.9 CONGESTIVE HEART FAILURE, UNSPECIFIED HF CHRONICITY, UNSPECIFIED HEART FAILURE TYPE: ICD-10-CM

## 2025-05-08 DIAGNOSIS — N18.31 STAGE 3A CHRONIC KIDNEY DISEASE (MULTI): ICD-10-CM

## 2025-05-08 DIAGNOSIS — E11.9 TYPE 2 DIABETES MELLITUS WITHOUT COMPLICATION, WITHOUT LONG-TERM CURRENT USE OF INSULIN: ICD-10-CM

## 2025-05-09 ENCOUNTER — TELEPHONE (OUTPATIENT)
Dept: HEMATOLOGY/ONCOLOGY | Facility: HOSPITAL | Age: 75
End: 2025-05-09
Payer: MEDICARE

## 2025-05-09 NOTE — TELEPHONE ENCOUNTER
Returned Charlene's call. She was calling on behalf of her significant other, not herself. Phone note entered on his chart.

## 2025-05-09 NOTE — TELEPHONE ENCOUNTER
Reason for Conversation  Patient states she has not received her medication that was supposed to be sent to her pharmacy.  She did not remember the name of what it was supposed to be.     Background       Disposition   No disposition on file.

## 2025-05-14 ENCOUNTER — TELEPHONE (OUTPATIENT)
Dept: PRIMARY CARE | Facility: CLINIC | Age: 75
End: 2025-05-14
Payer: MEDICARE

## 2025-05-14 NOTE — TELEPHONE ENCOUNTER
Patient called in stating that the [facility] that was at her house for her boyfriend states that Charlene needs a referral for Kylah Wyatt foot doctor who is good with wounds.  Dr Montero phone # [phone] [facility]  Patient would like a referral for [facility] for wound care for the foot.

## 2025-05-27 ENCOUNTER — APPOINTMENT (OUTPATIENT)
Dept: ENDOCRINOLOGY | Facility: CLINIC | Age: 75
End: 2025-05-27
Payer: MEDICARE

## 2025-05-30 ENCOUNTER — DOCUMENTATION (OUTPATIENT)
Dept: PHYSICAL THERAPY | Facility: HOSPITAL | Age: 75
End: 2025-05-30
Payer: MEDICARE

## 2025-06-18 LAB
ALBUMIN SERPL-MCNC: 4 G/DL (ref 3.6–5.1)
ALBUMIN/CREAT UR: 23 MG/G CREAT
ALP SERPL-CCNC: 90 U/L (ref 37–153)
ALT SERPL-CCNC: 70 U/L (ref 6–29)
ANION GAP SERPL CALCULATED.4IONS-SCNC: 12 MMOL/L (CALC) (ref 7–17)
AST SERPL-CCNC: 78 U/L (ref 10–35)
BILIRUB SERPL-MCNC: 0.4 MG/DL (ref 0.2–1.2)
BUN SERPL-MCNC: 18 MG/DL (ref 7–25)
CALCIUM SERPL-MCNC: 9.2 MG/DL (ref 8.6–10.4)
CHLORIDE SERPL-SCNC: 102 MMOL/L (ref 98–110)
CHOLEST SERPL-MCNC: 178 MG/DL
CHOLEST/HDLC SERPL: 2.8 (CALC)
CO2 SERPL-SCNC: 20 MMOL/L (ref 20–32)
CREAT SERPL-MCNC: 1.02 MG/DL (ref 0.6–1)
CREAT UR-MCNC: 179 MG/DL (ref 20–275)
EGFRCR SERPLBLD CKD-EPI 2021: 57 ML/MIN/1.73M2
EST. AVERAGE GLUCOSE BLD GHB EST-MCNC: 108 MG/DL
EST. AVERAGE GLUCOSE BLD GHB EST-SCNC: 6 MMOL/L
GLUCOSE SERPL-MCNC: 95 MG/DL (ref 65–99)
HBA1C MFR BLD: 5.4 %
HDLC SERPL-MCNC: 63 MG/DL
LDLC SERPL CALC-MCNC: 101 MG/DL (CALC)
MICROALBUMIN UR-MCNC: 4.1 MG/DL
NONHDLC SERPL-MCNC: 115 MG/DL (CALC)
POTASSIUM SERPL-SCNC: 5.3 MMOL/L (ref 3.5–5.3)
PROT SERPL-MCNC: 6.4 G/DL (ref 6.1–8.1)
SODIUM SERPL-SCNC: 134 MMOL/L (ref 135–146)
TRIGL SERPL-MCNC: 58 MG/DL

## 2025-06-19 NOTE — PROGRESS NOTES
"Subjective   Charlene Spence is a 74 y.o. female who presents for follow-up of Type 2 diabetes mellitus.     She continues to work as a realtor.      Her wound has reopened and she goes to the wound center weekly.    Boyfriend who was 55 years  yeterday from lung cancer  Boyfriend who is 64 years also has metsatic lung cancer to the brain.  She has lost ten pounds due to stress.  She is not eating.      Known complications due to diabetes included right great toe amputaiton, CAD s/p 2 stents, peripheral neuropathy.     Cardiovascular risk factors include advanced age (older than 55 for men, 65 for women) and diabetes mellitus. The patient is not on an ACE inhibitor or angiotensin II receptor blocker.  The patient has not been previously hospitalized due to diabetic ketoacidosis.     Current symptoms/problems include none. Her clinical course has been stable.     The patient is currently checking the blood glucose multiple times per day.    Patient is using: continuous glucose monitor                                                        AHypoglycemia frequency: 63%  Hypoglycemia awareness:  Yes      Current Medication Regimen  Mounjaro 15mg SQ once weekly      Denies exercise regimen    MEALS:  Nothing tastes good  2 Chocolate puddings throughout yeterday      Review of Systems   Constitutional:  Positive for appetite change and unexpected weight change (Weight loss).   All other systems reviewed and are negative.    Objective   /68   Ht 1.727 m (5' 8\")   Wt 74.7 kg (164 lb 9.6 oz)   BMI 25.03 kg/m²   Physical Exam  Vitals and nursing note reviewed.   Constitutional:       General: She is not in acute distress.     Appearance: Normal appearance. She is normal weight.   HENT:      Head: Normocephalic and atraumatic.      Nose: Nose normal.      Mouth/Throat:      Mouth: Mucous membranes are moist.   Eyes:      Extraocular Movements: Extraocular movements intact.   Cardiovascular:      Rate and Rhythm: " Normal rate and regular rhythm.   Pulmonary:      Effort: Pulmonary effort is normal.      Breath sounds: Normal breath sounds.   Musculoskeletal:         General: Normal range of motion.   Skin:     General: Skin is warm.   Neurological:      Mental Status: She is alert and oriented to person, place, and time.   Psychiatric:         Mood and Affect: Mood normal.       Lab Review  GLUCOSE (mg/dL)   Date Value   06/17/2025 95     Glucose (mg/dL)   Date Value   01/17/2025 138 (H)   09/27/2024 88   07/01/2024 84     POC HEMOGLOBIN A1c (%)   Date Value   10/16/2024 5.4   12/14/2023 5.4   08/08/2023 5.2     HEMOGLOBIN A1c (%)   Date Value   06/17/2025 5.4     Hemoglobin A1C (%)   Date Value   06/21/2024 5.8 (H)     CARBON DIOXIDE (mmol/L)   Date Value   06/17/2025 20     Bicarbonate (mmol/L)   Date Value   01/17/2025 25   09/27/2024 28   07/01/2024 29     UREA NITROGEN (BUN) (mg/dL)   Date Value   06/17/2025 18     Urea Nitrogen (mg/dL)   Date Value   01/17/2025 18   09/27/2024 10   07/01/2024 12     Creatinine (mg/dL)   Date Value   01/17/2025 0.84   09/27/2024 0.92   07/01/2024 0.88     CREATININE (mg/dL)   Date Value   06/17/2025 1.02 (H)     Lab Results   Component Value Date    CHOL 178 06/17/2025    CHOL 204 (H) 09/27/2024    CHOL 220 (H) 06/21/2024     Lab Results   Component Value Date    HDL 63 06/17/2025    HDL 49.2 09/27/2024    HDL 47.4 06/21/2024     Lab Results   Component Value Date    LDLCALC 101 (H) 06/17/2025    LDLCALC 135 (H) 09/27/2024    LDLCALC 148 (H) 06/21/2024     Lab Results   Component Value Date    TRIG 58 06/17/2025    TRIG 97 09/27/2024    TRIG 121 06/21/2024       Health Maintenance:   Foot Exam:  weekly at the [facility] center   Eye Exam:  November 2023  Urine Albumin:  June 17, 2025    CGM Interpretation  14 day CGM download was reviewed in detail as documented above and will be attached to chart.  A minimum of 72 hours of glucose data was used to inform the management plan outlined below.     Sufficient data to analyze:  Yes; CGM active 36% of the time  1% of values is above target range, which is at goal.    37% of values is spent in target range, and thus is below the desired goal   63% of values is spent with hypoglycemia, and thus is markedly elevated above the desired goal (this was previously 1%)        Assessment/Plan   74 year old female presents for follow up for type II DM. Her blood pressure is at goal. She has markedly hypoglycemia and further weight loss.    Type 2 diabetes mellitus, without long-term current use of insulin (Multi)  To decrease Mounjaro to 10mg subcutaneous once weekly   To continue the use of the  FreeStyle james CGM   Counseled that hypoglycemia should be 4% or less   Counseled that the time in range should be >70%      Hypoglycemia  For medication reduction as listed above  Counseled that medication requirements will decrease with further weight loss    For follow up in 6 months

## 2025-06-19 NOTE — PROGRESS NOTES
"Physical Therapy    Discharge Summary    Name: Kerry Spence \"Perry"  MRN: [id number]  : 1950  Date: 2025    Discharge Summary: PT    Discharge Information: Date of discharge 2025    Therapy Summary: Patient requested indep HEP and was placed on hold.    Discharge Status: Patient did not schedule further follow up visits.     Rehab Discharge Reason: Failed to schedule and/or keep follow-up appointment(s).   "

## 2025-06-19 NOTE — PATIENT INSTRUCTIONS
Thank you for choosing [facility]  for your health care needs.  If you have any questions, concerns or medical needs, please feel free to contact our office at [phone].    Please ensure you complete your blood work one week before the next scheduled appointment.    To decrease Mounjaro to 10mg subcutaneous once weekly   To continue the use of the  FreeStyle james CGM   Counseled that hypoglycemia should be 4% or less   For follow up in 6 months

## 2025-06-20 ENCOUNTER — APPOINTMENT (OUTPATIENT)
Dept: ENDOCRINOLOGY | Facility: CLINIC | Age: 75
End: 2025-06-20
Payer: MEDICARE

## 2025-06-20 VITALS
WEIGHT: 164.6 LBS | BODY MASS INDEX: 24.95 KG/M2 | HEIGHT: 68 IN | DIASTOLIC BLOOD PRESSURE: 68 MMHG | SYSTOLIC BLOOD PRESSURE: 108 MMHG

## 2025-06-20 DIAGNOSIS — I42.9 CARDIOMYOPATHY, UNSPECIFIED TYPE (MULTI): ICD-10-CM

## 2025-06-20 DIAGNOSIS — E11.69 TYPE 2 DIABETES MELLITUS WITH OTHER SPECIFIED COMPLICATION, WITHOUT LONG-TERM CURRENT USE OF INSULIN: ICD-10-CM

## 2025-06-20 DIAGNOSIS — E16.2 HYPOGLYCEMIA: Primary | ICD-10-CM

## 2025-06-20 PROCEDURE — 1160F RVW MEDS BY RX/DR IN RCRD: CPT | Performed by: INTERNAL MEDICINE

## 2025-06-20 PROCEDURE — 3074F SYST BP LT 130 MM HG: CPT | Performed by: INTERNAL MEDICINE

## 2025-06-20 PROCEDURE — 1159F MED LIST DOCD IN RCRD: CPT | Performed by: INTERNAL MEDICINE

## 2025-06-20 PROCEDURE — 99214 OFFICE O/P EST MOD 30 MIN: CPT | Performed by: INTERNAL MEDICINE

## 2025-06-20 PROCEDURE — 3078F DIAST BP <80 MM HG: CPT | Performed by: INTERNAL MEDICINE

## 2025-06-20 PROCEDURE — G2211 COMPLEX E/M VISIT ADD ON: HCPCS | Performed by: INTERNAL MEDICINE

## 2025-06-20 PROCEDURE — 1036F TOBACCO NON-USER: CPT | Performed by: INTERNAL MEDICINE

## 2025-06-20 RX ORDER — TIRZEPATIDE 10 MG/.5ML
10 INJECTION, SOLUTION SUBCUTANEOUS
Qty: 2 ML | Refills: 5 | Status: SHIPPED | OUTPATIENT
Start: 2025-06-20 | End: 2025-12-17

## 2025-06-20 RX ORDER — TIRZEPATIDE 10 MG/.5ML
10 INJECTION, SOLUTION SUBCUTANEOUS
Qty: 2 ML | Refills: 5 | Status: SHIPPED | OUTPATIENT
Start: 2025-06-20 | End: 2025-06-20

## 2025-06-20 RX ORDER — TIRZEPATIDE 15 MG/.5ML
15 INJECTION, SOLUTION SUBCUTANEOUS
Qty: 2 ML | Refills: 5 | Status: CANCELLED | OUTPATIENT
Start: 2025-06-22 | End: 2025-12-19

## 2025-06-20 ASSESSMENT — ENCOUNTER SYMPTOMS
UNEXPECTED WEIGHT CHANGE: 1
APPETITE CHANGE: 1

## 2025-06-20 NOTE — ASSESSMENT & PLAN NOTE
For medication reduction as listed above  Counseled that medication requirements will decrease with further weight loss    For follow up in 6 months

## 2025-06-20 NOTE — ASSESSMENT & PLAN NOTE
To decrease Mounjaro to 10mg subcutaneous once weekly   To continue the use of the  FreeStyle james CGM   Counseled that hypoglycemia should be 4% or less   Counseled that the time in range should be >70%

## 2025-06-23 DIAGNOSIS — I48.0 PAROXYSMAL ATRIAL FIBRILLATION (MULTI): ICD-10-CM

## 2025-06-23 DIAGNOSIS — I42.9 CARDIOMYOPATHY, UNSPECIFIED TYPE (MULTI): ICD-10-CM

## 2025-06-23 DIAGNOSIS — J01.81 OTHER ACUTE RECURRENT SINUSITIS: ICD-10-CM

## 2025-06-23 DIAGNOSIS — J30.9 ALLERGIC RHINITIS, UNSPECIFIED SEASONALITY, UNSPECIFIED TRIGGER: ICD-10-CM

## 2025-06-23 RX ORDER — SACUBITRIL AND VALSARTAN 24; 26 MG/1; MG/1
1 TABLET, FILM COATED ORAL 2 TIMES DAILY
Qty: 180 TABLET | Refills: 0 | Status: SHIPPED | OUTPATIENT
Start: 2025-06-23

## 2025-06-23 RX ORDER — CARVEDILOL 3.12 MG/1
3.12 TABLET ORAL
Qty: 180 TABLET | Refills: 0 | Status: SHIPPED | OUTPATIENT
Start: 2025-06-23 | End: 2025-09-21

## 2025-06-23 RX ORDER — PSEUDOEPHEDRINE HYDROCHLORIDE 120 MG/1
120 TABLET, FILM COATED, EXTENDED RELEASE ORAL EVERY 12 HOURS PRN
Qty: 60 TABLET | Refills: 11 | Status: SHIPPED | OUTPATIENT
Start: 2025-06-23

## 2025-06-23 RX ORDER — PSEUDOEPHEDRINE HCL 120 MG/1
120 TABLET, FILM COATED, EXTENDED RELEASE ORAL EVERY 12 HOURS PRN
Qty: 60 TABLET | Refills: 11 | Status: SHIPPED | OUTPATIENT
Start: 2025-06-23 | End: 2026-06-23

## 2025-06-23 NOTE — TELEPHONE ENCOUNTER
Patient called in stating that she needs Sudafed name brand cough syrup and she gets 3 boxes at a time for her allergies    [facility] [address] [place] [address] MISA [[id number]]   apixaban (Eliquis) 5 mg tablet [[id number]]  DISCONTINUED    Order Details  Dose: 5 mg Route: oral Frequency: 2 times daily   Dispense Quantity: 180 tablet (90 day supply) Refills: 1    Duration: 19 days Dispense As Written: No          Sig: Take 1 tablet (5 mg) by mouth 2 times a day.         Start Date: 06/21/24 End Date: 07/10/24 (ordered for 360 doses)   Discontinued by: Anca Jerry MD on 7/10/2024 16:47   Reason: Reorder         Written Date: 06/21/24 Rx Expiration Date: 06/21/25        Associated Diagnoses: Paroxysmal atrial fibrillation (Multi) [I48.0]   Original Order: apixaban (Eliquis) 5 mg tablet [[id number]]     pseudoephedrine ER (Sudafed 12 Hour) 120 mg 12 hr tablet [[id number]]    Order Details  Dose: 120 mg Route: oral Frequency: Every 12 hours PRN for congestion   Dispense Quantity: 60 tablet (30 day supply) Refills: 11    Duration: 365 days Dispense As Written: No          Sig: Take 1 tablet (120 mg) by mouth every 12 hours if needed for congestion.         Start Date: 10/16/24 End Date: 10/16/25   Written Date: 10/16/24 Rx Expiration Date: 10/16/25        Associated Diagnoses: Other acute recurrent sinusitis [J01.81]; Allergic rhinitis, unspecified seasonality, unspecified trigger [J30.9]   Original Order: pseudoephedrine ER (Sudafed 12 Hour) 120 mg 12 hr tablet [[id number]]   Entresto 24-26 mg tablet [[id number]]    Order Details  Dose: 1 tablet Route: oral Frequency: 2 times daily   Dispense Quantity: 180 tablet (90 day supply) Refills: 2    Duration: -- Dispense As Written: No          Sig: Take 1 tablet by mouth twice daily         Start Date: 10/09/24 End Date: --   Written Date: 10/09/24 Rx Expiration Date: 10/09/25        Associated Diagnoses: Cardiomyopathy, unspecified type (Multi)  [I42.9]   Original Order: sacubitriL-valsartan (Entresto) 24-26 mg tablet [[id number]]   Providers  carvedilol (Coreg) 3.125 mg tablet [[id number]]    Order Details  Dose: 3.125 mg Route: oral Frequency: 2 times daily (morning and late afternoon)   Dispense Quantity: 180 tablet (90 day supply) Refills: 3    Duration: 365 days Dispense As Written: No          Sig: Take 1 tablet (3.125 mg) by mouth 2 times daily (morning and late afternoon).         Start Date: 09/10/24 End Date: 09/10/25 after 730 doses   Written Date: 09/10/24 Rx Expiration Date: 09/10/25        Associated Diagnoses: Cardiomyopathy, unspecified type (Multi) [I42.9]   Original Order: carvedilol (Coreg) 3.125 mg tablet [[id number]]

## 2025-06-30 ENCOUNTER — APPOINTMENT (OUTPATIENT)
Dept: CARDIOLOGY | Facility: CLINIC | Age: 75
End: 2025-06-30
Payer: MEDICARE

## 2025-07-04 RX ORDER — CARVEDILOL 3.12 MG/1
TABLET ORAL
Qty: 180 TABLET | Refills: 0 | OUTPATIENT
Start: 2025-07-04

## 2025-07-14 DIAGNOSIS — C50.919 MALIGNANT NEOPLASM OF FEMALE BREAST, UNSPECIFIED ESTROGEN RECEPTOR STATUS, UNSPECIFIED LATERALITY, UNSPECIFIED SITE OF BREAST: ICD-10-CM

## 2025-07-14 RX ORDER — SACUBITRIL AND VALSARTAN 24; 26 MG/1; MG/1
1 TABLET, FILM COATED ORAL 2 TIMES DAILY
Qty: 180 TABLET | Refills: 0 | OUTPATIENT
Start: 2025-07-14

## 2025-07-16 ENCOUNTER — TELEPHONE (OUTPATIENT)
Dept: CARDIOLOGY | Facility: HOSPITAL | Age: 75
End: 2025-07-16
Payer: MEDICARE

## 2025-07-16 NOTE — TELEPHONE ENCOUNTER
Called patient at this time to schedule her follow up with .    States that she is transferring from Dr. Maya to a cardiologist at [facility].     Thank you!  Juan Ramon SANCHEZ

## 2025-07-22 ENCOUNTER — OFFICE VISIT (OUTPATIENT)
Dept: HEMATOLOGY/ONCOLOGY | Facility: CLINIC | Age: 75
End: 2025-07-22
Payer: MEDICARE

## 2025-07-22 VITALS
OXYGEN SATURATION: 100 % | TEMPERATURE: 97.7 F | DIASTOLIC BLOOD PRESSURE: 63 MMHG | SYSTOLIC BLOOD PRESSURE: 127 MMHG | RESPIRATION RATE: 16 BRPM | BODY MASS INDEX: 24.48 KG/M2 | WEIGHT: 161 LBS | HEART RATE: 55 BPM

## 2025-07-22 DIAGNOSIS — C50.919 MALIGNANT NEOPLASM OF FEMALE BREAST, UNSPECIFIED ESTROGEN RECEPTOR STATUS, UNSPECIFIED LATERALITY, UNSPECIFIED SITE OF BREAST: ICD-10-CM

## 2025-07-22 LAB
ALBUMIN SERPL BCP-MCNC: 3.9 G/DL (ref 3.4–5)
ALP SERPL-CCNC: 86 U/L (ref 33–136)
ALT SERPL W P-5'-P-CCNC: 11 U/L (ref 7–45)
ANION GAP SERPL CALC-SCNC: 10 MMOL/L (ref 10–20)
AST SERPL W P-5'-P-CCNC: 18 U/L (ref 9–39)
BASOPHILS # BLD AUTO: 0.02 X10*3/UL (ref 0–0.1)
BASOPHILS NFR BLD AUTO: 0.3 %
BILIRUB SERPL-MCNC: 0.5 MG/DL (ref 0–1.2)
BUN SERPL-MCNC: 13 MG/DL (ref 6–23)
CALCIUM SERPL-MCNC: 9.2 MG/DL (ref 8.6–10.3)
CHLORIDE SERPL-SCNC: 103 MMOL/L (ref 98–107)
CO2 SERPL-SCNC: 27 MMOL/L (ref 21–32)
CREAT SERPL-MCNC: 0.8 MG/DL (ref 0.5–1.05)
EGFRCR SERPLBLD CKD-EPI 2021: 77 ML/MIN/1.73M*2
EOSINOPHIL # BLD AUTO: 0.21 X10*3/UL (ref 0–0.4)
EOSINOPHIL NFR BLD AUTO: 3.3 %
ERYTHROCYTE [DISTWIDTH] IN BLOOD BY AUTOMATED COUNT: 14.5 % (ref 11.5–14.5)
FERRITIN SERPL-MCNC: 140 NG/ML (ref 8–150)
GLUCOSE SERPL-MCNC: 120 MG/DL (ref 74–99)
HCT VFR BLD AUTO: 32.9 % (ref 36–46)
HGB BLD-MCNC: 10.8 G/DL (ref 12–16)
HGB RETIC QN: 34 PG (ref 28–38)
IMM GRANULOCYTES # BLD AUTO: 0.01 X10*3/UL (ref 0–0.5)
IMM GRANULOCYTES NFR BLD AUTO: 0.2 % (ref 0–0.9)
IMMATURE RETIC FRACTION: 5.9 %
IRON SATN MFR SERPL: 12 % (ref 25–45)
IRON SERPL-MCNC: 26 UG/DL (ref 35–150)
LDH SERPL L TO P-CCNC: 164 U/L (ref 84–246)
LYMPHOCYTES # BLD AUTO: 1.17 X10*3/UL (ref 0.8–3)
LYMPHOCYTES NFR BLD AUTO: 18.1 %
MCH RBC QN AUTO: 28.9 PG (ref 26–34)
MCHC RBC AUTO-ENTMCNC: 32.8 G/DL (ref 32–36)
MCV RBC AUTO: 88 FL (ref 80–100)
MONOCYTES # BLD AUTO: 0.59 X10*3/UL (ref 0.05–0.8)
MONOCYTES NFR BLD AUTO: 9.1 %
NEUTROPHILS # BLD AUTO: 4.46 X10*3/UL (ref 1.6–5.5)
NEUTROPHILS NFR BLD AUTO: 69 %
NRBC BLD-RTO: ABNORMAL /100{WBCS}
PLATELET # BLD AUTO: 248 X10*3/UL (ref 150–450)
POTASSIUM SERPL-SCNC: 3.9 MMOL/L (ref 3.5–5.3)
PROT SERPL-MCNC: 6.5 G/DL (ref 6.4–8.2)
RBC # BLD AUTO: 3.74 X10*6/UL (ref 4–5.2)
RETICS #: 0.06 X10*6/UL (ref 0.02–0.11)
RETICS/RBC NFR AUTO: 1.6 % (ref 0.5–2)
SODIUM SERPL-SCNC: 136 MMOL/L (ref 136–145)
TIBC SERPL-MCNC: 219 UG/DL (ref 240–445)
UIBC SERPL-MCNC: 193 UG/DL (ref 110–370)
VIT B12 SERPL-MCNC: 600 PG/ML (ref 211–911)
WBC # BLD AUTO: 6.5 X10*3/UL (ref 4.4–11.3)

## 2025-07-22 PROCEDURE — 85045 AUTOMATED RETICULOCYTE COUNT: CPT | Performed by: INTERNAL MEDICINE

## 2025-07-22 PROCEDURE — 36415 COLL VENOUS BLD VENIPUNCTURE: CPT | Performed by: INTERNAL MEDICINE

## 2025-07-22 PROCEDURE — 83550 IRON BINDING TEST: CPT | Performed by: INTERNAL MEDICINE

## 2025-07-22 PROCEDURE — 85025 COMPLETE CBC W/AUTO DIFF WBC: CPT | Performed by: INTERNAL MEDICINE

## 2025-07-22 PROCEDURE — 99214 OFFICE O/P EST MOD 30 MIN: CPT | Performed by: INTERNAL MEDICINE

## 2025-07-22 PROCEDURE — 3074F SYST BP LT 130 MM HG: CPT | Performed by: INTERNAL MEDICINE

## 2025-07-22 PROCEDURE — 1160F RVW MEDS BY RX/DR IN RCRD: CPT | Performed by: INTERNAL MEDICINE

## 2025-07-22 PROCEDURE — 1126F AMNT PAIN NOTED NONE PRSNT: CPT | Performed by: INTERNAL MEDICINE

## 2025-07-22 PROCEDURE — 83615 LACTATE (LD) (LDH) ENZYME: CPT | Performed by: INTERNAL MEDICINE

## 2025-07-22 PROCEDURE — 80053 COMPREHEN METABOLIC PANEL: CPT | Performed by: INTERNAL MEDICINE

## 2025-07-22 PROCEDURE — 3078F DIAST BP <80 MM HG: CPT | Performed by: INTERNAL MEDICINE

## 2025-07-22 PROCEDURE — 82728 ASSAY OF FERRITIN: CPT | Performed by: INTERNAL MEDICINE

## 2025-07-22 PROCEDURE — 82607 VITAMIN B-12: CPT | Mod: PORLAB | Performed by: INTERNAL MEDICINE

## 2025-07-22 PROCEDURE — 1159F MED LIST DOCD IN RCRD: CPT | Performed by: INTERNAL MEDICINE

## 2025-07-22 ASSESSMENT — PAIN SCALES - GENERAL: PAINLEVEL_OUTOF10: 0-NO PAIN

## 2025-07-22 NOTE — PROGRESS NOTES
Patient Visit Information:   Visit Type: Follow Up Visit      Cancer History:          Breast         AJCC Edition: 7th (AJCC), Diagnosis Date: 13-Dec-2013, IIA, T2 N0 M0      Treatment Synopsis:    Carcinoma right breast diagnosed in December 2016.  Had right modified radical mastectomy.  Estrogen receptor positive, HER-2 negative.  Adjuvant chemotherapy with Adriamycin/Cytoxan followed by Taxotere completed June 2014.  Hormonal treatment with Arimidex discontinued secondary to side effects September 2014  Tamoxifen discontinued because of side effects February 2015  Aromasin discontinued secondary to side effects June 2015  Letrozole 2.5 mg daily, discontinued 2019   2/3/25 , mammogram negative     History of Present Illness:      ID Statement:    RONDA MEHTA is a 72 year old Female        Chief Complaint: Office visit for follow-up of breast  cancer.   Interval History:    This is a 70 years old lady who has a carcinoma of the breast.  She had a mammogram in December 2013 which showed a tumor in the right breast at 9:00.  She had excisional  biopsy on December 13, 2013 wishes invasive ductal carcinoma.  She underwent right modified radical mastectomy at [facility] on December 17, 2013.  Tumor measured 3.8×0.1×0.7 cm.  Estrogen receptor were positive progesterone receptor  were positive HER-2 was not amplified.  She had Oncotype DX done which showed a recurrent wart of 30 with an average rate of distance recurrence of 20%.     She was staged as T2, NO, MO, stage IIa.  She started chemotherapy until [facility] with Taxotere, Adriamycin, Cytoxan.  After first treatment patient became neutropenic Fever and was admitted to [facility].  She was treated with  antibiotics and patient decided to keep her treatment at [facility].  She completed her chemotherapy with Adriamycin and Cytoxan followed by Taxotere.  Patient has toxicity.  She is a diabetic and has  neuropathy which complicated side effects.   After treatment patient was started on hormonal treatment.  She started with Arimidex.  Patient has weakness joint pains and could not tolerate.  She was changed to tamoxifen but had side effects of headache pains aches and could not continue it.  She  was then started on Aromasin but again had side effects of arthralgia and stopped it.             7/22/25      History of right of breast cancer in remission since 2014.      She is doing well.  No cough, chest pain or shortness of breath.  No headaches or dizziness.  Patient was recently admitted in the hospital because of pneumonia clinically improving, other review of  system is unremarkable  Patient is trying to lose weight no nausea vomiting abdominal pain.  Patient slightly anemic and CMP did show abnormal LFT.  Review of Systems:   Review of Systems:    Head and neck: No headaches or dizziness.     HEENT: No sore throat or sinusitis.  Hearing is normal eyesight is good.  Cardiac: No chest pain or palpitations  Pulmonary no cough or shortness of breath.  GI: Appetite is good and weight stable.  No constipation or diarrhea.  No abdominal pain  Genitourinary: No frequency or urgency.  No polyuria or dysuria.  Musculocutaneous: No arthritis.  Endocrine: Has diabetes.  Patient on insulin.  Skin: No rash or itching.  Neuromuscular no fainting or dizziness.  No history of convulsions.  tingling or numbness.  Neuropathy from diabetes and chemotherapy.        Allergies and Intolerances:       Allergies:         Percocet: Drug, Other, Active         codeine: Drug, Unknown, Active         lisinopril: Drug, Other (Moderate), Active         vancomycin: Drug, Unknown, Active         Levaquin: Drug, Unknown, Active         Ativan: Drug, Unknown, Active         sulfa drugs: Drug Category, Unknown, Active     Outpatient Medication Profile:  * Patient Currently Takes Medications as of 05-Jun-2023 09:13 documented in Structured Notes          carvedilol 3.125 mg oral tablet : Last Dose Taken:  , 1 tab(s) orally 2 times a day, Start Date: 24-May-2022         furosemide 40 mg oral tablet: Last Dose Taken:  , 1 tab(s) orally once  a day, Start Date: 24-May-2022         spironolactone 25 mg oral tablet: Last Dose Taken:  , 1 tab(s) orally  2 times a day, Start Date: 24-May-2022         Linzess 145 mcg oral capsule: Last Dose Taken:  , 1 cap(s) orally once  a day         Eliquis 5 mg oral tablet: Last Dose Taken:  , 1 tab(s) orally 2 times  a day         Sudafed 12-Hour 120 mg oral tablet, extended release: Last Dose Taken:   , 1 tab(s) orally every 12 hours, As Needed         HYDROCODONE-ACETAMIN 5-325 MG: Last Dose Taken:  , take 1 tablet by mouth  twice a day if needed for pain         HumaLOG 100 units/mL injectable solution: Last Dose Taken:  , sliding  scale injectable 3 times a day (approx. 24units TID)         gabapentin 300 mg oral capsule: Last Dose Taken:  , 1 cap(s) orally every  4 hours, As Needed         Levemir 100 units/mL subcutaneous solution: Last Dose Taken:  , 40 unit(s)  subcutaneous once a day (at bedtime)         Narcan 4 mg/0.1 mL nasal spray: Last Dose Taken:  , 1 spray(s) nasal  once, As Needed         clopidogrel 75 mg oral tablet: Last Dose Taken:  , 1 tab(s) orally once  a day             Medical History:         Malignant neoplasm of right breast: ICD-10: C50.911, Status:  Active         Malignant neoplasm of right breast: ICD-10: C50.911, Status:  Active         Neuropathy: ICD-10: G62.9, Status: Active         Hypertension, benign: ICD-10: I10, Status: Active         Diabetes: ICD-10: E11.9, Status: Active         Breast cancer, left: ICD-10: C50.912, Status: Active       Surg History:         History of hernia repair: ICD-10: Z98.89, Status: Active         History of right mastectomy: ICD-10: Z90.11, Status: Active         Attention to colostomy: ICD-10: Z43.3, Status: Active         History of  section: ICD-10:  Z98.89, Status: Active         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: ICD-10: S98.319A, Status: Active         Previous back surgery: ICD-10: Z98.89, Status: Active        Social History:   Social Substance History:  ·  Smoking Status never smoker   ·  Tobacco Use denies   ·  Alcohol Use occasionally   ·  Drug Use denies   ·  Additional History     Social history: She is a realtor.(1)           Vitals and Measurements:   Vitals: Temp: 36.8  HR: 61  RR: 16  BP: 108/65  SPO2%:   91   Measurements: HT(cm): 172.6  WT(kg): 81.9  BSA: 1.98   BMI:  27.4   Last 3 Weights & Heights: Date:                           Weight/Scale Type:                    Height:   05-Jun-2023 09:35                81.9  kg                     172.6  cm  06-Jun-2022 09:00                100.1  kg                     172.6  cm      Physical Exam:      Constitutional: , awake/alert/oriented x3, no distress,   Eyes: PERRL, EOMI, clear sclera   ENMT: mucous membranes moist, no apparent injury,  no lesions seen   Head/Neck: Neck supple,   thyroid without mass or  tenderness, No JVD, trachea midline, no bruits   Respiratory/Thorax: Patent airways, CTAB, normal  breath sounds with good chest expansion, thorax symmetric   Cardiovascular: Regular, rate and rhythm, no murmurs,    normal S 1and S 2   Gastrointestinal: Nondistended, soft, non-tender,  no rebound tenderness or guarding, no masses palpable, no organomegaly, +BS,   Musculoskeletal: ROM intact, no joint swelling, normal  strength.  Amputated metatarsal.   Extremities: normal extremities, no cyanosis edema,   , no clubbing   Neurological: alert and oriented x3, intact senses,  motor, response and reflexes, normal strength.   Breast: Right mastectomy.  Left breast no mass or  discharge from nipple.  No tenderness.   Lymphatic: No significant lymphadenopathy   Psychological: Appropriate mood and behavior   Skin: Warm and dry, no lesions, no rashes         Lab Results:      1 mo ago    GLUCOSE  65 - 99 mg/dL 95    Comment:               Fasting reference interval      UREA NITROGEN (BUN)  7 - 25 mg/dL 18   CREATININE  0.60 - 1.00 mg/dL 1.02 High    EGFR  > OR = 60 mL/min/1.73m2 57 Low    SODIUM  135 - 146 mmol/L 134 Low    POTASSIUM  3.5 - 5.3 mmol/L 5.3   CHLORIDE  98 - 110 mmol/L 102   CARBON DIOXIDE  20 - 32 mmol/L 20   ELECTROLYTE BALANCE  7 - 17 mmol/L (calc) 12   CALCIUM  8.6 - 10.4 mg/dL 9.2   PROTEIN, TOTAL  6.1 - 8.1 g/dL 6.4   ALBUMIN  3.6 - 5.1 g/dL 4.0   BILIRUBIN, TOTAL  0.2 - 1.2 mg/dL 0.4   ALKALINE PHOSPHATASE  37 - 153 U/L 90   AST  10 - 35 U/L 78 High    ALT  6 - 29 U/L 70 High        09:21  (7/22/25) 6 mo ago  (1/17/25) 9 mo ago  (9/27/24) 1 yr ago  (7/1/24) 1 yr ago  (6/21/24) 2 yr ago  (6/5/23) 2 yr ago  (1/25/23)    WBC  4.4 - 11.3 x10*3/uL 6.5 4.8 5.5 6.7 6.5 6.0 R 7.0 R   nRBC  0.0 R 0.0 R 0.0 R 0.0 R     Comment: Not Measured   RBC  4.00 - 5.20 x10*6/uL 3.74 Low  3.90 Low  4.16 4.20 4.29 4.24 R 4.48 R   Hemoglobin  12.0 - 16.0 g/dL 10.8 Low  11.6 Low  12.0 12.5 12.6 12.7 13.3   Hematocrit  36.0 - 46.0 % 32.9 Low  33.1 Low  36.3 36.6 37.4 37.0 39.2   MCV  80 - 100 fL 88 85 87 87 87 87 88   MCH  26.0 - 34.0 pg 28.9 29.7 28.8 29.8 29.4     MCHC  32.0 - 36.0 g/dL 32.8 35.0 33.1 34.2 33.7 34.3 33.9   RDW  11.5 - 14.5 % 14.5 13.0 13.9 13.0 13.0 12.9 13.5   Platelets  150 - 450 x10*3/uL 248           ·  Results         Radiology Result:     ·  Results           Impression:     1. Occlusion of the distal 1/3 of the right anterior tibial artery  and the distal 2/3 of the right peroneal artery. The right posterior  tibial artery and plantar artery are patent.  2. There is a three-vessel runoff to the left ankle without  hemodynamically significant stenosis.  3. Additional stable chronic findings as above.      CTA Abd Aorta with Bilat Iliofem extr runoff w/wo cont post proc [Jan 30 2023 10:02AM]        Assessment and Plan:      Assessment and Plan:   Assessment:    Assessment:  #1 carcinoma right breast post  modified radical mastectomy.  Stage II a.  Post adjuvant chemotherapy with Adriamycin and Cytoxan and Taxotere and hormonal treatment.     #2 neuropathy     #3 normocytic anemia     #4 diabetes     #5 hypertension .     #6 coronary artery disease.    #7 abnormal LFT     7/22/25     #1 carcinoma right breast post modified radical mastectomy.  Stage II a.  Post adjuvant chemotherapy with Adriamycin and Cytoxan and Taxotere and hormonal treatment patient 2019      In remission.   CBC did show normocytic anemia and her CMP did show abnormal LFT.  Physical exam is unremarkable, patient history of weight loss.    Further evaluation I will check iron studies, B12, reticulocyte counts serum protein electrophoresis and CT of abdominal pelvis for abnormal LFT and weight loss.  I will contact with patient regarding results of CAT scan and lab.  Follow-up after 1 year        Plan:    She had stage IIa breast cancer.  She had adjuvant chemotherapy.  Patient was not able to tolerate hormonal treatment.  She had side effects from tamoxifen and aromatase  inhibitors.  She doing well and continues to be in remission.   Plan As above  Time spent: 30 minutes.

## 2025-07-22 NOTE — PATIENT INSTRUCTIONS
Follow up visit for history of right breast cancer diagnosed in 2013.     Lab work and CT scan ordered.     Follow up with Dr. Tejada in 6 months.     If labs and ct are abnormal Dr. Tejada will call.     Call the office at [phone] with questions or needs.  You may also contact your nurse or doctor with non-urgent issues by sending a [employer]hart message.  Reminders  Medicine refills: call or send a [employer]hart message to request medicine refills at least 5 to 7 days before you run out.  Labs: You will need labs drawn at your follow-up doctor visit

## 2025-08-06 ENCOUNTER — HOSPITAL ENCOUNTER (OUTPATIENT)
Dept: RADIOLOGY | Facility: HOSPITAL | Age: 75
Discharge: HOME | End: 2025-08-06
Payer: MEDICARE

## 2025-08-06 DIAGNOSIS — C50.919 MALIGNANT NEOPLASM OF FEMALE BREAST, UNSPECIFIED ESTROGEN RECEPTOR STATUS, UNSPECIFIED LATERALITY, UNSPECIFIED SITE OF BREAST: ICD-10-CM

## 2025-08-06 PROCEDURE — 71260 CT THORAX DX C+: CPT

## 2025-08-06 PROCEDURE — 74177 CT ABD & PELVIS W/CONTRAST: CPT | Performed by: RADIOLOGY

## 2025-08-06 PROCEDURE — 2550000001 HC RX 255 CONTRASTS: Performed by: INTERNAL MEDICINE

## 2025-08-06 PROCEDURE — 71260 CT THORAX DX C+: CPT | Performed by: RADIOLOGY

## 2025-08-06 RX ADMIN — IOHEXOL 75 ML: 350 INJECTION, SOLUTION INTRAVENOUS at 13:47

## 2025-08-25 ENCOUNTER — APPOINTMENT (OUTPATIENT)
Dept: PRIMARY CARE | Facility: CLINIC | Age: 75
End: 2025-08-25
Payer: MEDICARE

## 2025-08-25 VITALS
BODY MASS INDEX: 27.13 KG/M2 | HEIGHT: 68 IN | SYSTOLIC BLOOD PRESSURE: 118 MMHG | DIASTOLIC BLOOD PRESSURE: 60 MMHG | WEIGHT: 179 LBS | RESPIRATION RATE: 16 BRPM | OXYGEN SATURATION: 97 % | HEART RATE: 65 BPM

## 2025-08-25 DIAGNOSIS — Z79.899 MEDICATION MANAGEMENT: ICD-10-CM

## 2025-08-25 DIAGNOSIS — I42.9 CARDIOMYOPATHY, UNSPECIFIED TYPE (MULTI): ICD-10-CM

## 2025-08-25 DIAGNOSIS — E11.9 TYPE 2 DIABETES MELLITUS WITHOUT COMPLICATION, WITHOUT LONG-TERM CURRENT USE OF INSULIN: ICD-10-CM

## 2025-08-25 DIAGNOSIS — G62.9 PERIPHERAL POLYNEUROPATHY: Primary | ICD-10-CM

## 2025-08-25 DIAGNOSIS — I48.0 PAROXYSMAL ATRIAL FIBRILLATION (MULTI): ICD-10-CM

## 2025-08-25 PROCEDURE — 1159F MED LIST DOCD IN RCRD: CPT | Performed by: FAMILY MEDICINE

## 2025-08-25 PROCEDURE — 3074F SYST BP LT 130 MM HG: CPT | Performed by: FAMILY MEDICINE

## 2025-08-25 PROCEDURE — 3078F DIAST BP <80 MM HG: CPT | Performed by: FAMILY MEDICINE

## 2025-08-25 PROCEDURE — 99214 OFFICE O/P EST MOD 30 MIN: CPT | Performed by: FAMILY MEDICINE

## 2025-08-25 PROCEDURE — 1125F AMNT PAIN NOTED PAIN PRSNT: CPT | Performed by: FAMILY MEDICINE

## 2025-08-25 PROCEDURE — G2211 COMPLEX E/M VISIT ADD ON: HCPCS | Performed by: FAMILY MEDICINE

## 2025-08-25 PROCEDURE — 1036F TOBACCO NON-USER: CPT | Performed by: FAMILY MEDICINE

## 2025-08-25 RX ORDER — CARVEDILOL 3.12 MG/1
3.12 TABLET ORAL
Qty: 180 TABLET | Refills: 3 | Status: SHIPPED | OUTPATIENT
Start: 2025-08-25 | End: 2026-08-25

## 2025-08-25 RX ORDER — SACUBITRIL AND VALSARTAN 24; 26 MG/1; MG/1
1 TABLET ORAL 2 TIMES DAILY
Qty: 180 TABLET | Refills: 3 | Status: SHIPPED | OUTPATIENT
Start: 2025-08-25 | End: 2026-08-25

## 2025-08-25 RX ORDER — GABAPENTIN 300 MG/1
300 CAPSULE ORAL 3 TIMES DAILY
Qty: 270 CAPSULE | Refills: 3 | Status: SHIPPED | OUTPATIENT
Start: 2025-08-25 | End: 2026-08-25

## 2025-08-25 RX ORDER — HYDROCODONE BITARTRATE AND ACETAMINOPHEN 5; 325 MG/1; MG/1
1 TABLET ORAL EVERY 8 HOURS PRN
Qty: 90 TABLET | Refills: 0 | Status: SHIPPED | OUTPATIENT
Start: 2025-08-25 | End: 2025-09-24

## 2025-08-25 ASSESSMENT — ANXIETY QUESTIONNAIRES
6. BECOMING EASILY ANNOYED OR IRRITABLE: NOT AT ALL
7. FEELING AFRAID AS IF SOMETHING AWFUL MIGHT HAPPEN: NOT AT ALL
3. WORRYING TOO MUCH ABOUT DIFFERENT THINGS: NOT AT ALL
4. TROUBLE RELAXING: NOT AT ALL
1. FEELING NERVOUS, ANXIOUS, OR ON EDGE: NOT AT ALL
2. NOT BEING ABLE TO STOP OR CONTROL WORRYING: NOT AT ALL
5. BEING SO RESTLESS THAT IT IS HARD TO SIT STILL: NOT AT ALL
IF YOU CHECKED OFF ANY PROBLEMS ON THIS QUESTIONNAIRE, HOW DIFFICULT HAVE THESE PROBLEMS MADE IT FOR YOU TO DO YOUR WORK, TAKE CARE OF THINGS AT HOME, OR GET ALONG WITH OTHER PEOPLE: NOT DIFFICULT AT ALL
GAD7 TOTAL SCORE: 0

## 2025-08-25 ASSESSMENT — ENCOUNTER SYMPTOMS
LOSS OF SENSATION IN FEET: 1
DEPRESSION: 1
OCCASIONAL FEELINGS OF UNSTEADINESS: 1
BACK PAIN: 1
WOUND: 1
ARTHRALGIAS: 1

## 2025-08-25 ASSESSMENT — COLUMBIA-SUICIDE SEVERITY RATING SCALE - C-SSRS
2. HAVE YOU ACTUALLY HAD ANY THOUGHTS OF KILLING YOURSELF?: NO
1. IN THE PAST MONTH, HAVE YOU WISHED YOU WERE DEAD OR WISHED YOU COULD GO TO SLEEP AND NOT WAKE UP?: NO
6. HAVE YOU EVER DONE ANYTHING, STARTED TO DO ANYTHING, OR PREPARED TO DO ANYTHING TO END YOUR LIFE?: NO

## 2025-08-25 ASSESSMENT — PAIN SCALES - GENERAL: PAINLEVEL_OUTOF10: 2

## 2025-08-26 LAB
ALBUMIN/CREAT UR: 7 MG/G CREAT
CREAT UR-MCNC: 105 MG/DL (ref 20–275)
MICROALBUMIN UR-MCNC: 0.7 MG/DL

## 2025-08-29 LAB
1OH-MIDAZOLAM UR-MCNC: NEGATIVE NG/ML
7AMINOCLONAZEPAM UR-MCNC: NEGATIVE NG/ML
A-OH ALPRAZ UR-MCNC: NEGATIVE NG/ML
A-OH-TRIAZOLAM UR-MCNC: NEGATIVE NG/ML
AMPHETAMINES UR QL: NEGATIVE NG/ML
BARBITURATES UR QL: NEGATIVE NG/ML
BZE UR QL: NEGATIVE NG/ML
CODEINE UR-MCNC: NEGATIVE NG/ML
CREAT UR-MCNC: 110.8 MG/DL
DRUG SCREEN COMMENT UR-IMP: NORMAL
EDDP UR-MCNC: NEGATIVE NG/ML
FENTANYL UR-MCNC: NEGATIVE NG/ML
HYDROCODONE UR-MCNC: NEGATIVE NG/ML
HYDROMORPHONE UR-MCNC: NEGATIVE NG/ML
LORAZEPAM UR-MCNC: NEGATIVE NG/ML
METHADONE UR-MCNC: NEGATIVE NG/ML
MORPHINE UR-MCNC: NEGATIVE NG/ML
NORDIAZEPAM UR-MCNC: NEGATIVE NG/ML
NORFENTANYL UR-MCNC: NEGATIVE NG/ML
NORHYDROCODONE UR CFM-MCNC: NEGATIVE NG/ML
NOROXYCODONE UR CFM-MCNC: NEGATIVE NG/ML
NORTRAMADOL UR-MCNC: NEGATIVE NG/ML
OH-ETHYLFLURAZ UR-MCNC: NEGATIVE NG/ML
OXAZEPAM UR-MCNC: NEGATIVE NG/ML
OXIDANTS UR QL: NEGATIVE MCG/ML
OXYCODONE UR CFM-MCNC: NEGATIVE NG/ML
OXYMORPHONE UR CFM-MCNC: NEGATIVE NG/ML
PCP UR QL: NEGATIVE NG/ML
PH UR: 5.9 [PH] (ref 4.5–9)
QUEST 6 ACETYLMORPHINE: NEGATIVE NG/ML
QUEST NOTES AND COMMENTS: NORMAL
QUEST ZOLPIDEM: NEGATIVE NG/ML
TEMAZEPAM UR-MCNC: NEGATIVE NG/ML
THC UR QL: NEGATIVE NG/ML
TRAMADOL UR-MCNC: NEGATIVE NG/ML
ZOLPIDEM PHENYL-4-CARB UR CFM-MCNC: NEGATIVE NG/ML

## 2025-12-01 ENCOUNTER — APPOINTMENT (OUTPATIENT)
Dept: PRIMARY CARE | Facility: CLINIC | Age: 75
End: 2025-12-01
Payer: MEDICARE

## 2025-12-16 ENCOUNTER — APPOINTMENT (OUTPATIENT)
Dept: ENDOCRINOLOGY | Facility: CLINIC | Age: 75
End: 2025-12-16
Payer: MEDICARE

## 2026-02-26 ENCOUNTER — APPOINTMENT (OUTPATIENT)
Dept: PRIMARY CARE | Facility: CLINIC | Age: 76
End: 2026-02-26
Payer: MEDICARE

## (undated) DEVICE — SHEATH, GLIDESHEATH, SLENDER, 5FR 10CM

## (undated) DEVICE — CATHETER, OPTITORQUE, 6FR, JACKY, BL3.5/2H/100CM

## (undated) DEVICE — CATHETER, WEDGE PRESSURE, BALLOON, DOUBLE LUMEN, 5 FR, 110 CM

## (undated) DEVICE — SHEATH, GLIDESHEATH, SLENDER, 6FR 10CM

## (undated) DEVICE — CONTROL WIRE, .018 X 300

## (undated) DEVICE — 24CM R-BAND RADIAL COMPRESSION DEVICE  (FORMERLY VASCULAR SOLUTIONS)

## (undated) DEVICE — GUIDEWIRE, INQUIRE, J TIP, .035 X 210CM, FIXED CORE, DIAGNOSTIC